# Patient Record
Sex: FEMALE | Race: WHITE | NOT HISPANIC OR LATINO | Employment: FULL TIME | URBAN - NONMETROPOLITAN AREA
[De-identification: names, ages, dates, MRNs, and addresses within clinical notes are randomized per-mention and may not be internally consistent; named-entity substitution may affect disease eponyms.]

---

## 2017-01-23 DIAGNOSIS — Z30.42 ENCOUNTER FOR SURVEILLANCE OF INJECTABLE CONTRACEPTIVE: Primary | ICD-10-CM

## 2017-01-23 RX ORDER — MEDROXYPROGESTERONE ACETATE 150 MG/ML
150 INJECTION, SUSPENSION INTRAMUSCULAR
Qty: 1 ML | Refills: 6 | OUTPATIENT
Start: 2017-01-23 | End: 2019-05-01

## 2017-01-26 ENCOUNTER — ALLIED HEALTH/NURSE VISIT (OUTPATIENT)
Dept: ALLERGY | Facility: OTHER | Age: 27
End: 2017-01-26
Attending: PHYSICIAN ASSISTANT
Payer: COMMERCIAL

## 2017-01-26 PROCEDURE — 96372 THER/PROPH/DIAG INJ SC/IM: CPT

## 2017-01-26 NOTE — PROGRESS NOTES
The following medication was given:     MEDICATION: Depo Provera 150mg  ROUTE: IM  SITE: LUQ - Gluteus  DOSE: 1 ml  LOT #: [id number]  :  [employer]   EXPIRATION DATE:  08/2019  NDC#: [address]-0  Patient instructed to return April 13-April 27 for next injection.  Troy Fernandez LPN

## 2017-02-16 ENCOUNTER — OFFICE VISIT (OUTPATIENT)
Dept: INTERNAL MEDICINE | Facility: OTHER | Age: 27
End: 2017-02-16
Attending: NURSE PRACTITIONER
Payer: COMMERCIAL

## 2017-02-16 VITALS
DIASTOLIC BLOOD PRESSURE: 78 MMHG | RESPIRATION RATE: 18 BRPM | HEART RATE: 111 BPM | OXYGEN SATURATION: 98 % | SYSTOLIC BLOOD PRESSURE: 110 MMHG | HEIGHT: 66 IN | BODY MASS INDEX: 44.2 KG/M2 | WEIGHT: 275 LBS | TEMPERATURE: 99.5 F

## 2017-02-16 DIAGNOSIS — J01.00 ACUTE MAXILLARY SINUSITIS, RECURRENCE NOT SPECIFIED: Primary | ICD-10-CM

## 2017-02-16 PROCEDURE — 99213 OFFICE O/P EST LOW 20 MIN: CPT | Performed by: NURSE PRACTITIONER

## 2017-02-16 RX ORDER — AZITHROMYCIN 250 MG/1
TABLET, FILM COATED ORAL
Qty: 6 TABLET | Refills: 0 | Status: SHIPPED | OUTPATIENT
Start: 2017-02-16 | End: 2017-06-08

## 2017-02-16 ASSESSMENT — PAIN SCALES - GENERAL: PAINLEVEL: NO PAIN (0)

## 2017-02-16 NOTE — MR AVS SNAPSHOT
After Visit Summary   2/16/2017    Destini Quintanilla    MRN: [id number]           Patient Information     Date Of Birth          1990        Visit Information        Provider Department      2/16/2017 9:00 AM Jose [facility] Pollack NP [facility]        Today's Diagnoses     Acute maxillary sinusitis, recurrence not specified    -  1      Care Instructions    1. Drink  Lots fluids  2. Zpak -500mg today, 250mg a day for 4 days.   3, Flonase or nasonex spray if desired 2 times a day.         Follow-ups after your visit        Your next 10 appointments already scheduled     Feb 16, 2017  9:00 AM CST   (Arrive by 8:45 AM)   Office Visit with Jose Juan Pollack NP   [facility]    [address]  [facility] [address]   [phone]           Bring a current list of meds and any records pertaining to this visit.  For Physicals, please bring immunization records and any forms needing to be filled out.    Please arrive 15 minutes early to complete paperwork and register.              Who to contact     If you have questions or need follow up information about today's clinic visit or your schedule please contact [facility] directly at [phone].  Normal or non-critical lab and imaging results will be communicated to you by MyChart, letter or phone within 4 business days after the clinic has received the results. If you do not hear from us within 7 days, please contact the clinic through MyChart or phone. If you have a critical or abnormal lab result, we will notify you by phone as soon as possible.  Submit refill requests through [employer] or call your pharmacy and they will forward the refill request to us. Please allow 3 business days for your refill to be completed.          Additional Information About Your Visit        [employer] Information     [employer] lets you send messages to your doctor, view your test results, renew your prescriptions, schedule  "appointments and more. To sign up, go to [email or web address] . Click on \"Log in\" on the left side of the screen, which will take you to the Welcome page. Then click on \"Sign up Now\" on the right side of the page.     You will be asked to enter the access code listed below, as well as some personal information. Please follow the directions to create your username and password.     Your access code is: [email or web address]  Expires: 2017  8:48 AM     Your access code will  in 90 days. If you need help or a new code, please call your [place] clinic or [phone].        Care EveryWhere ID     This is your Care EveryWhere ID. This could be used by other organizations to access your [place] medical records  [id number]        Your Vitals Were     Pulse Temperature Respirations Height Pulse Oximetry BMI (Body Mass Index)    111 99.5  F (37.5  C) (Tympanic) 18 5' 6\" (1.676 m) 98% 44.39 kg/m2       Blood Pressure from Last 3 Encounters:   17 110/78   10/07/16 110/78   16 139/96    Weight from Last 3 Encounters:   17 275 lb (124.7 kg)   10/07/16 270 lb (122.5 kg)   16 278 lb (126.1 kg)              Today, you had the following     No orders found for display         Today's Medication Changes          These changes are accurate as of: 17  8:48 AM.  If you have any questions, ask your nurse or doctor.               Start taking these medicines.        Dose/Directions    azithromycin 250 MG tablet   Commonly known as:  ZITHROMAX   Used for:  Acute maxillary sinusitis, recurrence not specified   Started by:  Jose Juan Pollack NP        Two tablets first day, then one tablet daily for four days.   Quantity:  6 tablet   Refills:  0            Where to get your medicines      These medications were sent to [facility] [address]  [address] [place] [address]     Phone:  [phone]     azithromycin 250 MG tablet                Primary Care Provider " Office Phone # Fax #    Jose Juan Pollack [phone]       [facility] [place] [address]  [facility] [address]        Thank you!     Thank you for choosing [facility]  for your care. Our goal is always to provide you with excellent care. Hearing back from our patients is one way we can continue to improve our services. Please take a few minutes to complete the written survey that you may receive in the mail after your visit with us. Thank you!             Your Updated Medication List - Protect others around you: Learn how to safely use, store and throw away your medicines at www.disposemymeds.org.          This list is accurate as of: 2/16/17  8:48 AM.  Always use your most recent med list.                   Brand Name Dispense Instructions for use    azithromycin 250 MG tablet    ZITHROMAX    6 tablet    Two tablets first day, then one tablet daily for four days.       medroxyPROGESTERone 150 MG/ML injection    DEPO-PROVERA    1 mL    Inject 1 mL (150 mg) into the muscle every 3 months

## 2017-02-16 NOTE — LETTER
[facility] [place]  [address] [facility] [address]  [phone]             February 16, 2017    Destini Quintanilla  [birth year] [address] [facility] [address]              Dear Destini,    Please excuse Destini from work for medical illness    If you have any questions or concerns, please call myself or my nurse at [phone].      Sincerely,      Jose Juan CARR

## 2017-02-16 NOTE — NURSING NOTE
"Chief Complaint   Patient presents with     URI     c/o sneezing, cough, nasal drainage, stomache feels weird       Initial /78 (BP Location: Left arm, Patient Position: Chair, Cuff Size: Adult Large)  Pulse 111  Temp 99.5  F (37.5  C) (Tympanic)  Resp 18  Ht 5' 6\" (1.676 m)  Wt 275 lb (124.7 kg)  SpO2 98%  BMI 44.39 kg/m2 Estimated body mass index is 44.39 kg/(m^2) as calculated from the following:    Height as of this encounter: 5' 6\" (1.676 m).    Weight as of this encounter: 275 lb (124.7 kg).  Medication Reconciliation: complete   Milagros Sultana      "

## 2017-02-16 NOTE — PROGRESS NOTES
"SUBJECTIVE:  Destini ISABEL Quintanilla, a 26 year old female scheduled an appointment to discuss the following issues:  HA/Congestion: Started with HA over weekend.  Unknown if fever. THen sore throat. On Monday very congested-has postnasal drip,  No ear ache.  No NVD.    Medical, social, surgical, and family histories reviewed.    Current Outpatient Prescriptions   Medication     azithromycin (ZITHROMAX) 250 MG tablet     medroxyPROGESTERone (DEPO-PROVERA) 150 MG/ML injection     No current facility-administered medications for this visit.        ROS:  C: NEGATIVE for fever, chills,  POSITIVE   sore throat,  NO  Earache+ postnasal drip.   E: NEGATIVE for vision changes   R: NEGATIVE for  cough , SOB  CV: NEGATIVE for chest pain, palpitations   GI: NEGATIVE for nausea, abdominal pain, heartburn, or constipation, diarrhea.   : NEGATIVE for frequency, dysuria, or hematuria  M: NEGATIVE for significant arthralgias or myalgia  N: NEGATIVE for weakness,  Paresthesias, dizziness , + headache    OBJECTIVE:  /78 (BP Location: Left arm, Patient Position: Chair, Cuff Size: Adult Large)  Pulse 111  Temp 99.5  F (37.5  C) (Tympanic)  Resp 18  Ht 5' 6\" (1.676 m)  Wt 275 lb (124.7 kg)  SpO2 98%  BMI 44.39 kg/m2  EXAM:  GENERAL APPEARANCE:  alert and mild distress  EYES: EOMI,  PERRL  HENT:EARS: TM's pearly gray, good lite reflex, NOSE: Nares red, moist   Very  Swollen, clear exudate. Sounds very nasal.    THROAT: Oropharynx pink  Tongue midline, no fasciculations.   NECK: Supple,right anterior lymphadenopathy, no thyromegaly, no carotid bruits, No JVD  RESP: lungs clear to auscultation anteriorly and posteriorly - no rales, rhonchi or wheezes  CV: regular rates and rhythm, normal S1 S2, no S3 or S4 and no murmur, no click or rub -  MS: No edema         ASSESSMENT / PLAN:  (J01.00) Acute maxillary sinusitis, recurrence not specified  (primary encounter diagnosis)  CommentZpak 500mg today, 250mg a day for 4 days  2. Drink lots of " water  3. May try flonase or nasonex 1 spray to each nostril BID for congestion.  4. Protect self during intercourse for 1 1/2 - 2 weeks.     Plan: azithromycin (ZITHROMAX) 250 MG tablet

## 2017-02-16 NOTE — PATIENT INSTRUCTIONS
1. Drink  Lots fluids  2. Zpak -500mg today, 250mg a day for 4 days.   3, Flonase or nasonex spray if desired 2 times a day.

## 2017-02-17 RX ORDER — PREDNISONE 20 MG/1
20 TABLET ORAL DAILY
Qty: 5 TABLET | Refills: 0 | Status: SHIPPED | OUTPATIENT
Start: 2017-02-17 | End: 2017-06-08

## 2017-03-23 ENCOUNTER — APPOINTMENT (OUTPATIENT)
Dept: OCCUPATIONAL MEDICINE | Facility: OTHER | Age: 27
End: 2017-03-23

## 2017-03-23 PROCEDURE — 99000 SPECIMEN HANDLING OFFICE-LAB: CPT

## 2017-06-08 ENCOUNTER — OFFICE VISIT (OUTPATIENT)
Dept: INTERNAL MEDICINE | Facility: OTHER | Age: 27
End: 2017-06-08
Attending: NURSE PRACTITIONER
Payer: COMMERCIAL

## 2017-06-08 VITALS
SYSTOLIC BLOOD PRESSURE: 118 MMHG | DIASTOLIC BLOOD PRESSURE: 68 MMHG | OXYGEN SATURATION: 99 % | BODY MASS INDEX: 46 KG/M2 | TEMPERATURE: 97.7 F | WEIGHT: 285 LBS | HEART RATE: 104 BPM

## 2017-06-08 DIAGNOSIS — Z30.8 ENCOUNTER FOR OTHER CONTRACEPTIVE MANAGEMENT: ICD-10-CM

## 2017-06-08 DIAGNOSIS — L43.9 LICHEN PLANUS: Primary | ICD-10-CM

## 2017-06-08 LAB — HCG UR QL: NEGATIVE

## 2017-06-08 PROCEDURE — 81025 URINE PREGNANCY TEST: CPT | Performed by: NURSE PRACTITIONER

## 2017-06-08 PROCEDURE — 99213 OFFICE O/P EST LOW 20 MIN: CPT | Performed by: NURSE PRACTITIONER

## 2017-06-08 RX ORDER — DESOXIMETASONE 2.5 MG/G
CREAM TOPICAL 2 TIMES DAILY
Qty: 15 G | Refills: 1 | Status: SHIPPED | OUTPATIENT
Start: 2017-06-08 | End: 2019-05-24

## 2017-06-08 ASSESSMENT — ANXIETY QUESTIONNAIRES
2. NOT BEING ABLE TO STOP OR CONTROL WORRYING: NOT AT ALL
6. BECOMING EASILY ANNOYED OR IRRITABLE: NOT AT ALL
IF YOU CHECKED OFF ANY PROBLEMS ON THIS QUESTIONNAIRE, HOW DIFFICULT HAVE THESE PROBLEMS MADE IT FOR YOU TO DO YOUR WORK, TAKE CARE OF THINGS AT HOME, OR GET ALONG WITH OTHER PEOPLE: NOT DIFFICULT AT ALL
GAD7 TOTAL SCORE: 0
7. FEELING AFRAID AS IF SOMETHING AWFUL MIGHT HAPPEN: NOT AT ALL
1. FEELING NERVOUS, ANXIOUS, OR ON EDGE: NOT AT ALL
3. WORRYING TOO MUCH ABOUT DIFFERENT THINGS: NOT AT ALL
5. BEING SO RESTLESS THAT IT IS HARD TO SIT STILL: NOT AT ALL

## 2017-06-08 ASSESSMENT — PATIENT HEALTH QUESTIONNAIRE - PHQ9: 5. POOR APPETITE OR OVEREATING: NOT AT ALL

## 2017-06-08 ASSESSMENT — PAIN SCALES - GENERAL: PAINLEVEL: NO PAIN (0)

## 2017-06-08 NOTE — NURSING NOTE
"Chief Complaint   Patient presents with     Derm Problem     bumps on shoulder. Duration: 1 month. Patient states bumps are not painful or itchy      Contraception     patient needs depo shot        Initial /68 (BP Location: Left arm, Patient Position: Chair, Cuff Size: Adult Large)  Pulse 104  Temp 97.7  F (36.5  C) (Tympanic)  Wt 285 lb (129.3 kg)  SpO2 99%  Breastfeeding? No  BMI 46 kg/m2 Estimated body mass index is 46 kg/(m^2) as calculated from the following:    Height as of 2/16/17: 5' 6\" (1.676 m).    Weight as of this encounter: 285 lb (129.3 kg).  Medication Reconciliation: complete   Jaye Ludwig CMA(AAMA)   "

## 2017-06-08 NOTE — MR AVS SNAPSHOT
After Visit Summary   6/8/2017    Destini Quintanilla    MRN: [id number]           Patient Information     Date Of Birth          1990        Visit Information        Provider Department      6/8/2017 8:00 AM [facility]        Today's Diagnoses     Lichen planus    -  1      Care Instructions    1. Topicort cream  To rash 2 times a day  Thinly.   2. If rash does not resolve-will do biopsy.              Follow-ups after your visit        Who to contact     If you have questions or need follow up information about today's clinic visit or your schedule please contact [facility] directly at [phone].  Normal or non-critical lab and imaging results will be communicated to you by MyChart, letter or phone within 4 business days after the clinic has received the results. If you do not hear from us within 7 days, please contact the clinic through MyChart or phone. If you have a critical or abnormal lab result, we will notify you by phone as soon as possible.  Submit refill requests through [employer] or call your pharmacy and they will forward the refill request to us. Please allow 3 business days for your refill to be completed.          Additional Information About Your Visit        Care EveryWhere ID     This is your Care EveryWhere ID. This could be used by other organizations to access your [place] medical records  [id number]        Your Vitals Were     Pulse Temperature Pulse Oximetry Breastfeeding? BMI (Body Mass Index)       104 97.7  F (36.5  C) (Tympanic) 99% No 46 kg/m2        Blood Pressure from Last 3 Encounters:   06/08/17 118/68   02/16/17 110/78   10/07/16 110/78    Weight from Last 3 Encounters:   06/08/17 285 lb (129.3 kg)   02/16/17 275 lb (124.7 kg)   10/07/16 270 lb (122.5 kg)              Today, you had the following     No orders found for display         Today's Medication Changes          These changes are accurate as of: 6/8/17  8:18  AM.  If you have any questions, ask your nurse or doctor.               Start taking these medicines.        Dose/Directions    desoximetasone 0.25 % cream   Commonly known as:  TOPICORT   Used for:  Lichen planus   Started by:  Jose Juan Pollack NP        Apply topically 2 times daily   Quantity:  15 g   Refills:  1            Where to get your medicines      These medications were sent to [facility] [place] - [address]  [address] [place] [address]     Phone:  [phone]     desoximetasone 0.25 % cream                Primary Care Provider Office Phone # Fax #    Jose Juan Pollack [phone]       [facility] [address]  [place] [address]        Thank you!     Thank you for choosing [facility]  for your care. Our goal is always to provide you with excellent care. Hearing back from our patients is one way we can continue to improve our services. Please take a few minutes to complete the written survey that you may receive in the mail after your visit with us. Thank you!             Your Updated Medication List - Protect others around you: Learn how to safely use, store and throw away your medicines at www.disposemymeds.org.          This list is accurate as of: 6/8/17  8:18 AM.  Always use your most recent med list.                   Brand Name Dispense Instructions for use    desoximetasone 0.25 % cream    TOPICORT    15 g    Apply topically 2 times daily       medroxyPROGESTERone 150 MG/ML injection    DEPO-PROVERA    1 mL    Inject 1 mL (150 mg) into the muscle every 3 months

## 2017-06-08 NOTE — PROGRESS NOTES
SUBJECTIVE:  Destini ISABEL Quintanilla, a 26 year old female scheduled an appointment to discuss the following issues:  Bumps on arms: Has  Bumps on arm in inner elbow and across right shoulder   that appeared a month ago.  Do not itch-tried squeezing but don't go away.  Has been out in sun -hasn't had a  Reaction to  Sun before-has intolerance to peanut butter-and has been eating more peanut butter.  Depo Provera-needs injection.      Medical, social, surgical, and family histories reviewed.    Current Outpatient Prescriptions   Medication     medroxyPROGESTERone (DEPO-PROVERA) 150 MG/ML injection     No current facility-administered medications for this visit.        ROS:  C: NEGATIVE for fever, chills, sore throat, earache  E: NEGATIVE for vision changes   R: NEGATIVE for  cough , SOB  CV: NEGATIVE for chest pain, palpitations   GI: NEGATIVE for nausea, abdominal pain, heartburn, or constipation, diarrhea.   : NEGATIVE for frequency, dysuria, or hematuria  M: NEGATIVE for significant arthralgias or myalgia  Skin has bumps on inner elbow, and line of bumps on right shoulder that form almost a circular pattern in both areas.  -Non itchy, not painful    N: NEGATIVE for weakness,  Paresthesias, dizziness  or headache    OBJECTIVE:  /68 (BP Location: Left arm, Patient Position: Chair, Cuff Size: Adult Large)  Pulse 104  Temp 97.7  F (36.5  C) (Tympanic)  Wt 285 lb (129.3 kg)  SpO2 99%  Breastfeeding? No  BMI 46 kg/m2  EXAM:  GENERAL APPEARANCE:  alert and no distress  EYES: EOMI,  PERRL  .   NECK: Supple, no lymphadenopathy, no thyromegaly, no carotid bruits, No JVD  RESP: lungs clear to auscultation anteriorly and posteriorly - no rales, rhonchi or wheezes  CV: regular rates and rhythm, normal S1 S2, no S3 or S4 and no murmur, no click or rub -  l  MS: No edema Skin -has bumpy rash to inner antecubital  Area on Left, and line of bumps            ASSESSMENT / PLAN:  (L43.9) Lichen planus  (primary encounter  diagnosis)  Comment: Looks like lichen planus.  Discussed.    Plan: Topicort  Cream to rash 2 times a day thinly. Can do biopsy if doesn't resolve.      (Z30.8) Encounter for other contraceptive management  Comment:Given Depo Provera given.    Plan: HCG qualitative urine, C Medroxyprogesterone         inj/1mg

## 2017-06-08 NOTE — PATIENT INSTRUCTIONS
1. Topicort cream  To rash 2 times a day  Thinly.   2. If rash does not resolve-will do biopsy.

## 2017-06-09 ASSESSMENT — PATIENT HEALTH QUESTIONNAIRE - PHQ9: SUM OF ALL RESPONSES TO PHQ QUESTIONS 1-9: 0

## 2017-06-09 ASSESSMENT — ANXIETY QUESTIONNAIRES: GAD7 TOTAL SCORE: 0

## 2017-08-04 ENCOUNTER — APPOINTMENT (OUTPATIENT)
Dept: OCCUPATIONAL MEDICINE | Facility: OTHER | Age: 27
End: 2017-08-04

## 2017-08-11 ENCOUNTER — APPOINTMENT (OUTPATIENT)
Dept: OCCUPATIONAL MEDICINE | Facility: OTHER | Age: 27
End: 2017-08-11

## 2017-11-17 ENCOUNTER — OFFICE VISIT (OUTPATIENT)
Dept: INTERNAL MEDICINE | Facility: OTHER | Age: 27
End: 2017-11-17
Attending: NURSE PRACTITIONER
Payer: COMMERCIAL

## 2017-11-17 VITALS
HEIGHT: 65 IN | WEIGHT: 268 LBS | DIASTOLIC BLOOD PRESSURE: 68 MMHG | OXYGEN SATURATION: 99 % | HEART RATE: 72 BPM | BODY MASS INDEX: 44.65 KG/M2 | SYSTOLIC BLOOD PRESSURE: 116 MMHG | TEMPERATURE: 97.8 F

## 2017-11-17 DIAGNOSIS — Z00.00 ROUTINE HISTORY AND PHYSICAL EXAMINATION OF ADULT: Primary | ICD-10-CM

## 2017-11-17 DIAGNOSIS — Z30.42 ON DEPO-PROVERA FOR CONTRACEPTION: ICD-10-CM

## 2017-11-17 DIAGNOSIS — N76.0 BACTERIAL VAGINOSIS: ICD-10-CM

## 2017-11-17 DIAGNOSIS — B96.89 BACTERIAL VAGINOSIS: ICD-10-CM

## 2017-11-17 LAB
ALBUMIN SERPL-MCNC: 3.7 G/DL (ref 3.4–5)
ALP SERPL-CCNC: 80 U/L (ref 40–150)
ALT SERPL W P-5'-P-CCNC: 27 U/L (ref 0–50)
ANION GAP SERPL CALCULATED.3IONS-SCNC: 6 MMOL/L (ref 3–14)
AST SERPL W P-5'-P-CCNC: 17 U/L (ref 0–45)
BASOPHILS # BLD AUTO: 0 10E9/L (ref 0–0.2)
BASOPHILS NFR BLD AUTO: 0.7 %
BILIRUB SERPL-MCNC: 0.5 MG/DL (ref 0.2–1.3)
BUN SERPL-MCNC: 10 MG/DL (ref 7–30)
CALCIUM SERPL-MCNC: 9 MG/DL (ref 8.5–10.1)
CHLORIDE SERPL-SCNC: 106 MMOL/L (ref 94–109)
CHOLEST SERPL-MCNC: 162 MG/DL
CO2 SERPL-SCNC: 27 MMOL/L (ref 20–32)
CREAT SERPL-MCNC: 0.67 MG/DL (ref 0.52–1.04)
DIFFERENTIAL METHOD BLD: NORMAL
EOSINOPHIL # BLD AUTO: 0.2 10E9/L (ref 0–0.7)
EOSINOPHIL NFR BLD AUTO: 3.1 %
ERYTHROCYTE [DISTWIDTH] IN BLOOD BY AUTOMATED COUNT: 11.8 % (ref 10–15)
GFR SERPL CREATININE-BSD FRML MDRD: >90 ML/MIN/1.7M2
GLUCOSE SERPL-MCNC: 88 MG/DL (ref 70–99)
HCG UR QL: NEGATIVE
HCT VFR BLD AUTO: 41.8 % (ref 35–47)
HDLC SERPL-MCNC: 48 MG/DL
HGB BLD-MCNC: 14.4 G/DL (ref 11.7–15.7)
IMM GRANULOCYTES # BLD: 0 10E9/L (ref 0–0.4)
IMM GRANULOCYTES NFR BLD: 0.3 %
LDLC SERPL CALC-MCNC: 96 MG/DL
LYMPHOCYTES # BLD AUTO: 2.3 10E9/L (ref 0.8–5.3)
LYMPHOCYTES NFR BLD AUTO: 38.8 %
MCH RBC QN AUTO: 30.6 PG (ref 26.5–33)
MCHC RBC AUTO-ENTMCNC: 34.4 G/DL (ref 31.5–36.5)
MCV RBC AUTO: 89 FL (ref 78–100)
MONOCYTES # BLD AUTO: 0.5 10E9/L (ref 0–1.3)
MONOCYTES NFR BLD AUTO: 8.9 %
NEUTROPHILS # BLD AUTO: 2.8 10E9/L (ref 1.6–8.3)
NEUTROPHILS NFR BLD AUTO: 48.2 %
NONHDLC SERPL-MCNC: 114 MG/DL
NRBC # BLD AUTO: 0 10*3/UL
NRBC BLD AUTO-RTO: 0 /100
PLATELET # BLD AUTO: 305 10E9/L (ref 150–450)
POTASSIUM SERPL-SCNC: 3.9 MMOL/L (ref 3.4–5.3)
PROT SERPL-MCNC: 7.6 G/DL (ref 6.8–8.8)
RBC # BLD AUTO: 4.7 10E12/L (ref 3.8–5.2)
SODIUM SERPL-SCNC: 139 MMOL/L (ref 133–144)
SPECIMEN SOURCE: ABNORMAL
TRIGL SERPL-MCNC: 91 MG/DL
TSH SERPL DL<=0.005 MIU/L-ACNC: 2.89 MU/L (ref 0.4–4)
WBC # BLD AUTO: 5.9 10E9/L (ref 4–11)
WET PREP SPEC: ABNORMAL

## 2017-11-17 PROCEDURE — 80061 LIPID PANEL: CPT | Performed by: NURSE PRACTITIONER

## 2017-11-17 PROCEDURE — G0123 SCREEN CERV/VAG THIN LAYER: HCPCS | Performed by: NURSE PRACTITIONER

## 2017-11-17 PROCEDURE — 87210 SMEAR WET MOUNT SALINE/INK: CPT | Performed by: NURSE PRACTITIONER

## 2017-11-17 PROCEDURE — 99395 PREV VISIT EST AGE 18-39: CPT | Performed by: NURSE PRACTITIONER

## 2017-11-17 PROCEDURE — 81025 URINE PREGNANCY TEST: CPT | Performed by: NURSE PRACTITIONER

## 2017-11-17 PROCEDURE — 80050 GENERAL HEALTH PANEL: CPT | Performed by: NURSE PRACTITIONER

## 2017-11-17 PROCEDURE — 36415 COLL VENOUS BLD VENIPUNCTURE: CPT | Performed by: NURSE PRACTITIONER

## 2017-11-17 RX ORDER — MEDROXYPROGESTERONE ACETATE 150 MG/ML
150 INJECTION, SUSPENSION INTRAMUSCULAR
Qty: 150 ML | Refills: 4 | OUTPATIENT
Start: 2017-11-17 | End: 2019-05-01

## 2017-11-17 ASSESSMENT — ANXIETY QUESTIONNAIRES
4. TROUBLE RELAXING: NOT AT ALL
1. FEELING NERVOUS, ANXIOUS, OR ON EDGE: NOT AT ALL
2. NOT BEING ABLE TO STOP OR CONTROL WORRYING: NOT AT ALL
6. BECOMING EASILY ANNOYED OR IRRITABLE: NOT AT ALL
7. FEELING AFRAID AS IF SOMETHING AWFUL MIGHT HAPPEN: NOT AT ALL
5. BEING SO RESTLESS THAT IT IS HARD TO SIT STILL: NOT AT ALL
GAD7 TOTAL SCORE: 0
3. WORRYING TOO MUCH ABOUT DIFFERENT THINGS: NOT AT ALL

## 2017-11-17 ASSESSMENT — PAIN SCALES - GENERAL: PAINLEVEL: NO PAIN (0)

## 2017-11-17 ASSESSMENT — PATIENT HEALTH QUESTIONNAIRE - PHQ9: SUM OF ALL RESPONSES TO PHQ QUESTIONS 1-9: 0

## 2017-11-17 NOTE — NURSING NOTE
"Chief Complaint   Patient presents with     Physical     patient on regular diet. has no been to the dentist twice a year. has been to the eye doctor in the last two years. patient does not exercise. doesn't have more then 3 alcholic beverages in a day and is on a regular diet      *_* Health Care Directive *_*     declined        Initial /68 (BP Location: Left arm, Patient Position: Chair, Cuff Size: Adult Large)  Pulse 72  Temp 97.8  F (36.6  C) (Tympanic)  Ht 5' 5.25\" (1.657 m)  Wt 268 lb (121.6 kg)  SpO2 99%  Breastfeeding? No  BMI 44.26 kg/m2 Estimated body mass index is 44.26 kg/(m^2) as calculated from the following:    Height as of this encounter: 5' 5.25\" (1.657 m).    Weight as of this encounter: 268 lb (121.6 kg).  Medication Reconciliation: rosalie LudwigThe following medication was given:     MEDICATION: Depo Provera 150mg  ROUTE: IM  SITE: RUQ - Gluteus  DOSE: 150 mg  LOT #: [id number]  :  [employer]   EXPIRATION DATE:  11/2019  NDC#: [address]-9  Pregnancy test negative. The patient tolerated well. Patient to return for next injection between 2/2/18 and 2/16/18 Milagros Sultana CMA([facility])   "

## 2017-11-17 NOTE — PROGRESS NOTES
Destini is a 27 year old female   who presents for physical      PMHx:  No past medical history on file.  @SURGICALHX  GYNE HX: On Depo   No periods, no breakthrough bleeding.    , no dyspareunia. Is not currently in a relationship      No history of abnormal pap or MMG.      MEDICATIONS were reviewed and are as follows:   Current Outpatient Prescriptions   Medication Sig Dispense Refill     desoximetasone (TOPICORT) 0.25 % cream Apply topically 2 times daily 15 g 1     medroxyPROGESTERone (DEPO-PROVERA) 150 MG/ML injection Inject 1 mL (150 mg) into the muscle every 3 months 1 mL 6       ALLERGIES:  No known drug allergy    Family History   Problem Relation Age of Onset     CANCER Maternal Grandmother      CANCER Maternal Grandfather      liver     HEART DISEASE Paternal Grandmother      HEART DISEASE Paternal Grandfather        Review Of Systems  Skin: negative for, rash, itching, bruising  Eyes: negative for, visual blurring, eye pain  Wears Contacts.    Ears/Nose/Throat: EARS: No pain, discharge   NOSE: no runny nose, postnasal drip or bloody nose.  . THROAT:  No pain, or difficulty chewing or swallowing.  Teeth in good repair. No Dentist.    NECK: no pain, stiffness, lumps or bumps.    Breast: No masses, no nipple drainage.  Respiratory: No shortness of breath, dyspnea on exertion, cough, or hemoptysis  Cardiovascular: negative for, palpitations and chest pain  Gastrointestinal: negative for, nausea, vomiting, heartburn, dyspepsia, reflux, excessive gas or bloating, constipation and diarrhea No blood in stool    Genitourinary: negative for, dysuria, frequency and urgency  Musculoskeletal: negative for, back pain, neck pain, joint pain and joint stiffness  Neurologic: negative for, headaches, syncope, local weakness, numbness or tingling of hands, numbness or tingling of feet, memory problems and tremor  Psychiatric: negative for, anxiety and depression  Hematologic/Lymphatic/Immunologic: negative for, chills,  "fever, night sweats and weight loss  Endocrine: negative for, cold intolerance, heat intolerance and night sweats  IMMUNIZATIONS:  Up to date   by report.Does not want flu vaccine.       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    Exam:/68 (BP Location: Left arm, Patient Position: Chair, Cuff Size: Adult Large)  Pulse 72  Temp 97.8  F (36.6  C) (Tympanic)  Ht 5' 5.25\" (1.657 m)  Wt 268 lb (121.6 kg)  SpO2 99%  Breastfeeding? No  BMI 44.26 kg/m2    Constitutional: healthy, alert and no distress  Head: Normocephalic. No masses, lesions, tenderness or abnormalities  Neck: Neck supple. No adenopathy. Thyroid symmetric, normal size,, Carotids without bruits.  ENT: EARS: Bilateral TM's pearly gray, good lite reflex. NOSE: Bilateral nares red, nonswollen, no exudates. THROAT: Oropharynx pink, no exudates Tongue midline, teeth in good repair.  no neck nodes or sinus tenderness  Cardiovascular: S1S2  No lifts, heaves, or thrills. RRR. No murmurs, clicks gallops or rub, no JVD  Respiratory: . Good diaphragmatic excursion. Lungs clear anteriorly and Posteriorly.     Breasts: Right smaller than left  without suspicious masses,  But has lumpy breasts.  No   skin changes or axillary nodes,   Gastrointestinal: Abdomen soft, non-tender. BS normal. No masses, organomegaly  : Labia without lumps or lesions.  Vagina with appropriate rugae.  Cervix pink.  Much whitish secretions.  Wet prep done.  PAP done.    Musculoskeletal: extremities normal- no gross deformities noted, gait normal and normal muscle tone  Skin: no suspicious lesions or rashes  Neurologic: Gait normal. Reflexes normal and symmetric. Sensation grossly WNL.  Psychiatric: mentation appears normal and affect normal/bright  Hematologic/Lymphatic/Immunologic: normal ant/post cervical, axillary, supraclavicular and inguinal nodes  Results for orders placed or performed in visit on " 11/17/17   HCG qualitative urine   Result Value Ref Range    HCG Qual Urine Negative NEG^Negative   CBC with platelets and differential   Result Value Ref Range    WBC 5.9 4.0 - 11.0 10e9/L    RBC Count 4.70 3.8 - 5.2 10e12/L    Hemoglobin 14.4 11.7 - 15.7 g/dL    Hematocrit 41.8 35.0 - 47.0 %    MCV 89 78 - 100 fl    MCH 30.6 26.5 - 33.0 pg    MCHC 34.4 31.5 - 36.5 g/dL    RDW 11.8 10.0 - 15.0 %    Platelet Count 305 150 - 450 10e9/L    Diff Method Automated Method     % Neutrophils 48.2 %    % Lymphocytes 38.8 %    % Monocytes 8.9 %    % Eosinophils 3.1 %    % Basophils 0.7 %    % Immature Granulocytes 0.3 %    Nucleated RBCs 0 0 /100    Absolute Neutrophil 2.8 1.6 - 8.3 10e9/L    Absolute Lymphocytes 2.3 0.8 - 5.3 10e9/L    Absolute Monocytes 0.5 0.0 - 1.3 10e9/L    Absolute Eosinophils 0.2 0.0 - 0.7 10e9/L    Absolute Basophils 0.0 0.0 - 0.2 10e9/L    Abs Immature Granulocytes 0.0 0 - 0.4 10e9/L    Absolute Nucleated RBC 0.0    Comprehensive metabolic panel (BMP + Alb, Alk Phos, ALT, AST, Total. Bili, TP)   Result Value Ref Range    Sodium 139 133 - 144 mmol/L    Potassium 3.9 3.4 - 5.3 mmol/L    Chloride 106 94 - 109 mmol/L    Carbon Dioxide 27 20 - 32 mmol/L    Anion Gap 6 3 - 14 mmol/L    Glucose 88 70 - 99 mg/dL    Urea Nitrogen 10 7 - 30 mg/dL    Creatinine 0.67 0.52 - 1.04 mg/dL    GFR Estimate >90 >60 mL/min/1.7m2    GFR Estimate If Black >90 >60 mL/min/1.7m2    Calcium 9.0 8.5 - 10.1 mg/dL    Bilirubin Total 0.5 0.2 - 1.3 mg/dL    Albumin 3.7 3.4 - 5.0 g/dL    Protein Total 7.6 6.8 - 8.8 g/dL    Alkaline Phosphatase 80 40 - 150 U/L    ALT 27 0 - 50 U/L    AST 17 0 - 45 U/L   Lipid Profile (Chol, Trig, HDL, LDL calc)   Result Value Ref Range    Cholesterol 162 <200 mg/dL    Triglycerides 91 <150 mg/dL    HDL Cholesterol 48 (L) >49 mg/dL    LDL Cholesterol Calculated 96 <100 mg/dL    Non HDL Cholesterol 114 <130 mg/dL   TSH with free T4 reflex   Result Value Ref Range    TSH 2.89 0.40 - 4.00 mU/L   Wet  prep   Result Value Ref Range    Specimen Description Vagina     Wet Prep Clue cells seen (A)     Wet Prep Many  WBC'S seen       Wet Prep No Trichomonas seen     Wet Prep No yeast seen        ASSESSMENT:  Routine Medical Exam    ASSESSMENT / PLAN:  (Z00.00) Routine history and physical examination of adult  (primary encounter diagnosis)  Comment:  Breast exam    Lab Results   Component Value Date    WBC 5.9 11/17/2017     Lab Results   Component Value Date    RBC 4.70 11/17/2017     Lab Results   Component Value Date    HGB 14.4 11/17/2017     Lab Results   Component Value Date    HCT 41.8 11/17/2017     No components found for: MCT  Lab Results   Component Value Date    MCV 89 11/17/2017     Lab Results   Component Value Date    MCH 30.6 11/17/2017     Lab Results   Component Value Date    MCHC 34.4 11/17/2017     Lab Results   Component Value Date    RDW 11.8 11/17/2017     Lab Results   Component Value Date     11/17/2017     Creatinine   Date Value Ref Range Status   11/17/2017 0.67 0.52 - 1.04 mg/dL Final   ]  Potassium   Date Value Ref Range Status   11/17/2017 3.9 3.4 - 5.3 mmol/L Final   ]  Lab Results   Component Value Date    CHOL 162 11/17/2017     Lab Results   Component Value Date    HDL 48 11/17/2017     Lab Results   Component Value Date    LDL 96 11/17/2017     Lab Results   Component Value Date    TRIG 91 11/17/2017     Lab Results   Component Value Date    CHOLHDLRATIO 3.1 09/05/2014     Lab Results   Component Value Date    AST 17 11/17/2017     Lab Results   Component Value Date    ALT 27 11/17/2017       Plan: CBC with platelets and differential,         Comprehensive metabolic panel (BMP + Alb, Alk         Phos, ALT, AST, Total. Bili, TP), Lipid Profile        (Chol, Trig, HDL, LDL calc), TSH with free T4         reflex,         A pap thin layer screen reflex to HPV          - recommend age 25 - 29, Wet prep            (Z30.40) On Depo-Provera for contraception  Comment:Negative  pregnancy test. Given Depo Provera.  150mg.    Plan: HCG qualitative urine, medroxyPROGESTERone         (DEPO-PROVERA) 150 MG/ML injection         (N76.0,  B96.89) Bacterial vaginosis  Comment:+ clue cells on wet prep.  Called patient offered flagyl or clinda ovules.    Plan:patient to call back.

## 2017-11-17 NOTE — MR AVS SNAPSHOT
"              After Visit Summary   11/17/2017    Destini Quintanilla    MRN: [id number]           Patient Information     Date Of Birth          1990        Visit Information        Provider Department      11/17/2017 8:00 AM Jose Juan Pollack NP [facility]        Today's Diagnoses     On Depo-Provera for contraception    -  1    Routine history and physical examination of adult          Care Instructions    1. Keep up good health!!!  2. Go to Dentist.            Follow-ups after your visit        Who to contact     If you have questions or need follow up information about today's clinic visit or your schedule please contact [facility] directly at [phone].  Normal or non-critical lab and imaging results will be communicated to you by MyChart, letter or phone within 4 business days after the clinic has received the results. If you do not hear from us within 7 days, please contact the clinic through MyChart or phone. If you have a critical or abnormal lab result, we will notify you by phone as soon as possible.  Submit refill requests through [employer] or call your pharmacy and they will forward the refill request to us. Please allow 3 business days for your refill to be completed.          Additional Information About Your Visit        Care EveryWhere ID     This is your Care EveryWhere ID. This could be used by other organizations to access your [facility] medical records  [id number]        Your Vitals Were     Pulse Temperature Height Pulse Oximetry Breastfeeding? BMI (Body Mass Index)    72 97.8  F (36.6  C) (Tympanic) 5' 5.25\" (1.657 m) 99% No 44.26 kg/m2       Blood Pressure from Last 3 Encounters:   11/17/17 116/68   06/08/17 118/68   02/16/17 110/78    Weight from Last 3 Encounters:   11/17/17 268 lb (121.6 kg)   06/08/17 285 lb (129.3 kg)   02/16/17 275 lb (124.7 kg)              We Performed the Following     A pap thin layer screen reflex to HPV if ASCUS - recommend age 25 - " 29     A pap thin layer screen with  HPV - recommended age 30 - 65 years (select HPV order below)     CBC with platelets and differential     Comprehensive metabolic panel (BMP + Alb, Alk Phos, ALT, AST, Total. Bili, TP)     HCG qualitative urine     Lipid Profile (Chol, Trig, HDL, LDL calc)     TSH with free T4 reflex     Wet prep          Today's Medication Changes          These changes are accurate as of: 11/17/17  8:57 AM.  If you have any questions, ask your nurse or doctor.               These medicines have changed or have updated prescriptions.        Dose/Directions    * medroxyPROGESTERone 150 MG/ML injection   Commonly known as:  DEPO-PROVERA   This may have changed:  Another medication with the same name was added. Make sure you understand how and when to take each.   Used for:  Encounter for surveillance of injectable contraceptive   Changed by:  Jose Juan Pollack, ILEANA        Dose:  150 mg   Inject 1 mL (150 mg) into the muscle every 3 months   Quantity:  1 mL   Refills:  6       * medroxyPROGESTERone 150 MG/ML injection   Commonly known as:  DEPO-PROVERA   This may have changed:  You were already taking a medication with the same name, and this prescription was added. Make sure you understand how and when to take each.   Used for:  On Depo-Provera for contraception   Changed by:  Jose Juan Pollack NP        Dose:  150 mg   Inject 1 mL (150 mg) into the muscle every 3 months   Quantity:  150 mL   Refills:  4       * Notice:  This list has 2 medication(s) that are the same as other medications prescribed for you. Read the directions carefully, and ask your doctor or other care provider to review them with you.         Where to get your medicines      Some of these will need a paper prescription and others can be bought over the counter.  Ask your nurse if you have questions.     You don't need a prescription for these medications     medroxyPROGESTERone 150 MG/ML injection                Primary Care Provider  Office Phone # Fax #    Jose Juan Pollack [phone]       [facility] [place] [address]  [facility] [address]        Equal Access to Services     CAROLINA ELI : Hadii valeria ku hadyadio Soomaali, waaxda luqadaha, qaybta kaalmada adeegyada, waxcriss medeiros laAlexispietro herr. So [facility] [phone].    ATENCIÓN: Si habla español, tiene a quiñones disposición servicios gratuitos de asistencia lingüística. Llame al [phone].    We comply with applicable federal civil rights laws and [place] laws. We do not discriminate on the basis of race, color, national origin, age, disability, sex, sexual orientation, or gender identity.            Thank you!     Thank you for choosing [facility]  for your care. Our goal is always to provide you with excellent care. Hearing back from our patients is one way we can continue to improve our services. Please take a few minutes to complete the written survey that you may receive in the mail after your visit with us. Thank you!             Your Updated Medication List - Protect others around you: Learn how to safely use, store and throw away your medicines at www.disposemymeds.org.          This list is accurate as of: 11/17/17  8:57 AM.  Always use your most recent med list.                   Brand Name Dispense Instructions for use Diagnosis    desoximetasone 0.25 % cream    TOPICORT    15 g    Apply topically 2 times daily        * medroxyPROGESTERone 150 MG/ML injection    DEPO-PROVERA    1 mL    Inject 1 mL (150 mg) into the muscle every 3 months    Encounter for surveillance of injectable contraceptive       * medroxyPROGESTERone 150 MG/ML injection    DEPO-PROVERA    150 mL    Inject 1 mL (150 mg) into the muscle every 3 months    On Depo-Provera for contraception       * Notice:  This list has 2 medication(s) that are the same as other medications prescribed for you. Read the directions carefully, and ask your doctor or other care provider  to review them with you.

## 2017-11-18 ASSESSMENT — ANXIETY QUESTIONNAIRES: GAD7 TOTAL SCORE: 0

## 2017-11-20 DIAGNOSIS — B96.89 BACTERIAL VAGINOSIS: Primary | ICD-10-CM

## 2017-11-20 DIAGNOSIS — N76.0 BACTERIAL VAGINOSIS: Primary | ICD-10-CM

## 2017-11-22 LAB
COPATH REPORT: NORMAL
PAP: NORMAL

## 2018-02-05 ENCOUNTER — ALLIED HEALTH/NURSE VISIT (OUTPATIENT)
Dept: ALLERGY | Facility: OTHER | Age: 28
End: 2018-02-05
Attending: NURSE PRACTITIONER
Payer: COMMERCIAL

## 2018-02-05 DIAGNOSIS — Z30.9 CONTRACEPTIVE MANAGEMENT: Primary | ICD-10-CM

## 2018-02-05 PROCEDURE — 96372 THER/PROPH/DIAG INJ SC/IM: CPT

## 2018-02-05 NOTE — PROGRESS NOTES
Prior to injection verified patient identity using patient's name and date of birth.    Per orders of Jose Juan Pollack, injection of Depo Provera given by Khloe Dye. Patient instructed to remain in clinic for 20 minutes afterwards, and to report any adverse reaction to me immediately.    The following medication was given:     MEDICATION: Depo Provera 150mg  ROUTE: IM  SITE: LUQ - Gluteus  DOSE: 150 mg  LOT #: [id number]  :  [employer]   EXPIRATION DATE:  03/2020  NDC#: [address]-0  Pt to RTC April 23-May 7, 2018 for next injection  Khloe Dye LPN

## 2018-02-05 NOTE — MR AVS SNAPSHOT
After Visit Summary   2/5/2018    Destini Quintanilla    MRN: [id number]           Patient Information     Date Of Birth          1990        Visit Information        Provider Department      2/5/2018 1:15 PM HC SHOT ROOM [facility]        Today's Diagnoses     Contraceptive management    -  1       Follow-ups after your visit        Your next 10 appointments already scheduled     Feb 05, 2018  1:15 PM CST   injection with HC SHOT ROOM   [facility] [place] ([facility] [place] [facility]    [address]  [place] [address]   [phone]              Who to contact     If you have questions or need follow up information about today's clinic visit or your schedule please contact [facility] directly at [phone].  Normal or non-critical lab and imaging results will be communicated to you by MyChart, letter or phone within 4 business days after the clinic has received the results. If you do not hear from us within 7 days, please contact the clinic through MyChart or phone. If you have a critical or abnormal lab result, we will notify you by phone as soon as possible.  Submit refill requests through [employer] or call your pharmacy and they will forward the refill request to us. Please allow 3 business days for your refill to be completed.          Additional Information About Your Visit        Care EveryWhere ID     This is your Care EveryWhere ID. This could be used by other organizations to access your [facility] medical records  [id number]         Blood Pressure from Last 3 Encounters:   11/17/17 116/68   06/08/17 118/68   02/16/17 110/78    Weight from Last 3 Encounters:   11/17/17 268 lb (121.6 kg)   06/08/17 285 lb (129.3 kg)   02/16/17 275 lb (124.7 kg)              We Performed the Following     INJECTION INTRAMUSCULAR OR SUB-Q     Medroxyprogesterone inj  1mg   (Depo Provera J-Code)        Primary Care Provider Office Phone # Fax #    Jose Juan Pollack NP  [phone]       [facility] [place] [address]  [place] [address]        Equal Access to Services     CAROLINA ELI : Hadii aad ku hadyadijenaro [place], sadieda rhondarahatha, rios katonja pickering, wilfrido medeiros lamatthieurena herr. So [facility] [phone].    ATENCIÓN: Si habla español, tiene a quiñones disposición servicios gratuitos de asistencia lingüística. Llame al [phone].    We comply with applicable federal civil rights laws and [place] laws. We do not discriminate on the basis of race, color, national origin, age, disability, sex, sexual orientation, or gender identity.            Thank you!     Thank you for choosing [facility]  for your care. Our goal is always to provide you with excellent care. Hearing back from our patients is one way we can continue to improve our services. Please take a few minutes to complete the written survey that you may receive in the mail after your visit with us. Thank you!             Your Updated Medication List - Protect others around you: Learn how to safely use, store and throw away your medicines at www.disposemymeds.org.          This list is accurate as of 2/5/18  1:07 PM.  Always use your most recent med list.                   Brand Name Dispense Instructions for use Diagnosis    desoximetasone 0.25 % cream    TOPICORT    15 g    Apply topically 2 times daily        * medroxyPROGESTERone 150 MG/ML injection    DEPO-PROVERA    1 mL    Inject 1 mL (150 mg) into the muscle every 3 months    Encounter for surveillance of injectable contraceptive       * medroxyPROGESTERone 150 MG/ML injection    DEPO-PROVERA    150 mL    Inject 1 mL (150 mg) into the muscle every 3 months    On Depo-Provera for contraception       * Notice:  This list has 2 medication(s) that are the same as other medications prescribed for you. Read the directions carefully, and ask your doctor or other care provider to review them with you.

## 2018-04-25 ENCOUNTER — ALLIED HEALTH/NURSE VISIT (OUTPATIENT)
Dept: ALLERGY | Facility: OTHER | Age: 28
End: 2018-04-25
Attending: FAMILY MEDICINE
Payer: COMMERCIAL

## 2018-04-25 DIAGNOSIS — Z30.42 ENCOUNTER FOR SURVEILLANCE OF INJECTABLE CONTRACEPTIVE: Primary | ICD-10-CM

## 2018-04-25 PROCEDURE — 96372 THER/PROPH/DIAG INJ SC/IM: CPT

## 2018-04-25 NOTE — PROGRESS NOTES
Prior to injection verified patient identity using patient's name and date of birth.    The following medication was given:     MEDICATION: Depo Provera 150mg  ROUTE: IM  SITE: RUQ - Gluteus  DOSE: 1mL  LOT #: [id number]  :  [employer]   EXPIRATION DATE:  05/2020  NDC#: [address]-4  The patient was instructed to return between 7/11-7/25 for next injection.   Luciana Gipson

## 2018-04-25 NOTE — MR AVS SNAPSHOT
"              After Visit Summary   4/25/2018    Destini Quintanilla    MRN: [id number]           Patient Information     Date Of Birth          1990        Visit Information        Provider Department      4/25/2018 9:00 AM [facility]        Today's Diagnoses     Encounter for surveillance of injectable contraceptive    -  1       Follow-ups after your visit        Your next 10 appointments already scheduled     Apr 25, 2018  9:00 AM CDT   injection with HC SHOT ROOM   [facility] [place] ([facility] [place] [facility]    [address]  [place] [address]   [phone]              Who to contact     If you have questions or need follow up information about today's clinic visit or your schedule please contact [facility] directly at [phone].  Normal or non-critical lab and imaging results will be communicated to you by MyChart, letter or phone within 4 business days after the clinic has received the results. If you do not hear from us within 7 days, please contact the clinic through MyChart or phone. If you have a critical or abnormal lab result, we will notify you by phone as soon as possible.  Submit refill requests through [employer] or call your pharmacy and they will forward the refill request to us. Please allow 3 business days for your refill to be completed.          Additional Information About Your Visit        MyChart Information     [employer] lets you send messages to your doctor, view your test results, renew your prescriptions, schedule appointments and more. To sign up, go to [email or web address] . Click on \"Log in\" on the left side of the screen, which will take you to the Welcome page. Then click on \"Sign up Now\" on the right side of the page.     You will be asked to enter the access code listed below, as well as some personal information. Please follow the directions to create your username and password.     Your access code is: " [email or web address]  Expires: 2018  8:51 AM     Your access code will  in 90 days. If you need help or a new code, please call your [place] clinic or [phone].        Care EveryWhere ID     This is your Care EveryWhere ID. This could be used by other organizations to access your [place] medical records  [id number]         Blood Pressure from Last 3 Encounters:   17 116/68   17 118/68   17 110/78    Weight from Last 3 Encounters:   17 268 lb (121.6 kg)   17 285 lb (129.3 kg)   17 275 lb (124.7 kg)              We Performed the Following     INJECTION INTRAMUSCULAR OR SUB-Q     Medroxyprogesterone inj  1mg   (Depo Provera J-Code)        Primary Care Provider Office Phone # Fax #    Jose Juan SPENCER Pollack, ILEANA [phone]       [facility] [place] [address]  [facility] [place] [address]        Equal Access to Services     [facility]: Hadii aad ku hadasho Soomaali, waaxda luqadaha, qaybta kaalmada adeegyada, waxay idiin haypietro moreira . So [facility] [phone].    ATENCIÓN: Si habla español, tiene a quiñones disposición servicios gratuitos de asistencia lingüística. Llame al [phone].    We comply with applicable federal civil rights laws and [place] laws. We do not discriminate on the basis of race, color, national origin, age, disability, sex, sexual orientation, or gender identity.            Thank you!     Thank you for choosing [facility] [place]  for your care. Our goal is always to provide you with excellent care. Hearing back from our patients is one way we can continue to improve our services. Please take a few minutes to complete the written survey that you may receive in the mail after your visit with us. Thank you!             Your Updated Medication List - Protect others around you: Learn how to safely use, store and throw away your medicines at www.disposemymeds.org.          This list is accurate as of 18  8:51 AM.  Always use  your most recent med list.                   Brand Name Dispense Instructions for use Diagnosis    desoximetasone 0.25 % cream    TOPICORT    15 g    Apply topically 2 times daily        * medroxyPROGESTERone 150 MG/ML injection    DEPO-PROVERA    1 mL    Inject 1 mL (150 mg) into the muscle every 3 months    Encounter for surveillance of injectable contraceptive       * medroxyPROGESTERone 150 MG/ML injection    DEPO-PROVERA    150 mL    Inject 1 mL (150 mg) into the muscle every 3 months    On Depo-Provera for contraception       * Notice:  This list has 2 medication(s) that are the same as other medications prescribed for you. Read the directions carefully, and ask your doctor or other care provider to review them with you.

## 2018-08-03 ENCOUNTER — ALLIED HEALTH/NURSE VISIT (OUTPATIENT)
Dept: ALLERGY | Facility: OTHER | Age: 28
End: 2018-08-03
Attending: NURSE PRACTITIONER
Payer: COMMERCIAL

## 2018-08-03 ENCOUNTER — APPOINTMENT (OUTPATIENT)
Dept: LAB | Facility: OTHER | Age: 28
End: 2018-08-03
Attending: NURSE PRACTITIONER
Payer: COMMERCIAL

## 2018-08-03 DIAGNOSIS — Z30.42 ENCOUNTER FOR SURVEILLANCE OF INJECTABLE CONTRACEPTIVE: Primary | ICD-10-CM

## 2018-08-03 LAB — HCG UR QL: NEGATIVE

## 2018-08-03 PROCEDURE — 96372 THER/PROPH/DIAG INJ SC/IM: CPT

## 2018-08-03 PROCEDURE — 81025 URINE PREGNANCY TEST: CPT | Performed by: NURSE PRACTITIONER

## 2018-08-03 NOTE — MR AVS SNAPSHOT
After Visit Summary   8/3/2018    Destini Quintanilla    MRN: [id number]           Patient Information     Date Of Birth          1990        Visit Information        Provider Department      8/3/2018 8:30 AM HC SHOT ROOM [facility]        Today's Diagnoses     Encounter for surveillance of injectable contraceptive    -  1       Follow-ups after your visit        Who to contact     If you have questions or need follow up information about today's clinic visit or your schedule please contact [facility] directly at [phone].  Normal or non-critical lab and imaging results will be communicated to you by MyChart, letter or phone within 4 business days after the clinic has received the results. If you do not hear from us within 7 days, please contact the clinic through MyChart or phone. If you have a critical or abnormal lab result, we will notify you by phone as soon as possible.  Submit refill requests through [employer] or call your pharmacy and they will forward the refill request to us. Please allow 3 business days for your refill to be completed.          Additional Information About Your Visit        Care EveryWhere ID     This is your Care EveryWhere ID. This could be used by other organizations to access your [facility] medical records  [id number]         Blood Pressure from Last 3 Encounters:   11/17/17 116/68   06/08/17 118/68   02/16/17 110/78    Weight from Last 3 Encounters:   11/17/17 268 lb (121.6 kg)   06/08/17 285 lb (129.3 kg)   02/16/17 275 lb (124.7 kg)              We Performed the Following     C Medroxyprogesterone inj/1mg (J-Code)     HCG qualitative urine     THER/PROPH/DIAG INJ, SC/IM        Primary Care Provider Office Phone # Fax #    Jose Juan Pollack [phone] [address] [facility] [address]        Equal Access to Services     CAROLINA ELI AH: Crystal Reid, eros mccartney, rios pickering,  wilfrido mikejoann sengjames schuler'aan ah. So [facility] [phone].    ATENCIÓN: Si azaleala tima, tiene a quiñones disposición servicios gratuitos de asistencia lingüística. David aldridge [phone].    We comply with applicable federal civil rights laws and [place] laws. We do not discriminate on the basis of race, color, national origin, age, disability, sex, sexual orientation, or gender identity.            Thank you!     [facility] you [facility]  [facility]. Our goal is always to provide you with excellent care. Hearing back from our patients is one way we can continue to improve our services. Please take a few minutes to complete the written survey that you may receive in the mail after your visit with us. Thank you!             Your Updated Medication List - Protect others around you: Learn how to safely use, store and throw away your medicines at www.disposemymeds.org.          This list is accurate as of 8/3/18  8:41 AM.  Always use your most recent med list.                   Brand Name Dispense Instructions for use Diagnosis    desoximetasone 0.25 % cream    TOPICORT    15 g    Apply topically 2 times daily        * medroxyPROGESTERone 150 MG/ML injection    DEPO-PROVERA    1 mL    Inject 1 mL (150 mg) into the muscle every 3 months    Encounter for surveillance of injectable contraceptive       * medroxyPROGESTERone 150 MG/ML injection    DEPO-PROVERA    150 mL    Inject 1 mL (150 mg) into the muscle every 3 months    On Depo-Provera for contraception       * Notice:  This list has 2 medication(s) that are the same as other medications prescribed for you. Read the directions carefully, and ask your doctor or other care provider to review them with you.

## 2018-10-25 ENCOUNTER — TELEPHONE (OUTPATIENT)
Dept: ALLERGY | Facility: OTHER | Age: 28
End: 2018-10-25

## 2018-10-25 NOTE — TELEPHONE ENCOUNTER
Patient is on the schedule for a Depo-provera injection tomorrow, but order has .  PCP is Jose Juan Pollack.  Last office visit 17.  Medication is pended.  Thank you.    Saundra Denny RN

## 2018-10-26 ENCOUNTER — ALLIED HEALTH/NURSE VISIT (OUTPATIENT)
Dept: ALLERGY | Facility: OTHER | Age: 28
End: 2018-10-26
Attending: NURSE PRACTITIONER
Payer: COMMERCIAL

## 2018-10-26 PROCEDURE — 96372 THER/PROPH/DIAG INJ SC/IM: CPT

## 2018-10-26 NOTE — PROGRESS NOTES
MEDICATION: Depo Provera 150 mg  ROUTE: IM  SITE: RUQ - Gluteus  DOSE: 1 mL  LOT #: [id number]  :  Wipebook   EXPIRATION DATE:  06/2020  NDC#: [address]-7  Pt instructed to return to the clinic between 1/11-1/25/19 for next injection.  QUIRINO GARCIA

## 2018-10-26 NOTE — MR AVS SNAPSHOT
After Visit Summary   10/26/2018    Destini Quintanilla    MRN: [id number]           Patient Information     Date Of Birth          1990        Visit Information        Provider Department      10/26/2018 2:00 PM HC SHOT ROOM [facility]        Today's Diagnoses     Contraception    -  1       Follow-ups after your visit        Who to contact     If you have questions or need follow up information about today's clinic visit or your schedule please contact [facility] directly at [phone].  Normal or non-critical lab and imaging results will be communicated to you by MyChart, letter or phone within 4 business days after the clinic has received the results. If you do not hear from us within 7 days, please contact the clinic through MyChart or phone. If you have a critical or abnormal lab result, we will notify you by phone as soon as possible.  Submit refill requests through [employer] or call your pharmacy and they will forward the refill request to us. Please allow 3 business days for your refill to be completed.          Additional Information About Your Visit        Care EveryWhere ID     This is your Care EveryWhere ID. This could be used by other organizations to access your [facility] medical records  [id number]         Blood Pressure from Last 3 Encounters:   11/17/17 116/68   06/08/17 118/68   02/16/17 110/78    Weight from Last 3 Encounters:   11/17/17 268 lb (121.6 kg)   06/08/17 285 lb (129.3 kg)   02/16/17 275 lb (124.7 kg)              We Performed the Following     C Medroxyprogesterone inj/1mg     THER/PROPH/DIAG INJ, SC/IM        Primary Care Provider Office Phone # Fax #    Jose Juan Pollack [phone] [address] [facility] [address]        Equal Access to Services     CAROLINA ELI : Crystal Reid, eros mccartney, rios pickering, wilfrido herr. So [facility]  [phone].    ATENCIÓN: Si sveta alfred, tiene a quiñones disposición servicios gratuitos de asistencia lingüística. David aldridge [phone].    We comply with applicable federal civil rights laws and [place] laws. We do not discriminate on the basis of race, color, national origin, age, disability, sex, sexual orientation, or gender identity.            Thank you!     Thank you for choosing [facility]  for your care. Our goal is always to provide you with excellent care. Hearing back from our patients is one way we can continue to improve our services. Please take a few minutes to complete the written survey that you may receive in the mail after your visit with us. Thank you!             Your Updated Medication List - Protect others around you: Learn how to safely use, store and throw away your medicines at www.disposemymeds.org.          This list is accurate as of 10/26/18  2:09 PM.  Always use your most recent med list.                   Brand Name Dispense Instructions for use Diagnosis    desoximetasone 0.25 % cream    TOPICORT    15 g    Apply topically 2 times daily        * medroxyPROGESTERone 150 MG/ML injection    DEPO-PROVERA    1 mL    Inject 1 mL (150 mg) into the muscle every 3 months    Encounter for surveillance of injectable contraceptive       * medroxyPROGESTERone 150 MG/ML injection    DEPO-PROVERA    150 mL    Inject 1 mL (150 mg) into the muscle every 3 months    On Depo-Provera for contraception       * Notice:  This list has 2 medication(s) that are the same as other medications prescribed for you. Read the directions carefully, and ask your doctor or other care provider to review them with you.

## 2018-12-08 NOTE — PROGRESS NOTES
The following medication was given:     MEDICATION: Depo Provera 150mg  ROUTE: IM  SITE: [facility]  DOSE: 150mg/mL  LOT #: [id number]  :  FlixChip   EXPIRATION DATE:  06/2020  NDC#: [address]-2  Next Depo due 10/19-11/2  Juhi Gipson       
No

## 2019-01-18 ENCOUNTER — APPOINTMENT (OUTPATIENT)
Dept: OCCUPATIONAL MEDICINE | Facility: OTHER | Age: 29
End: 2019-01-18

## 2019-01-21 DIAGNOSIS — Z78.9 USES BIRTH CONTROL: Primary | ICD-10-CM

## 2019-01-21 RX ORDER — MEDROXYPROGESTERONE ACETATE 150 MG/ML
150 INJECTION, SUSPENSION INTRAMUSCULAR
Status: CANCELLED | OUTPATIENT
Start: 2019-01-25 | End: 2020-01-19

## 2019-01-21 NOTE — Clinical Note
Additional orders are needed for Depo Provera.  They are prepared for you to sign, should you wish the patient to continue.  She is scheduled for an injection on Friday of this week.Thanks, you.Gladis Flores

## 2019-01-21 NOTE — Clinical Note
Additional orders are needed for Depo Provera injections.  She will be in this Friday for her injection.  Last injection was 10/26/18.  This order fulfills the MAR update.Thank you.Gladis Flores RN on 1/21/2019 at 8:59 AM

## 2019-01-22 RX ORDER — MEDROXYPROGESTERONE ACETATE 150 MG/ML
150 INJECTION, SUSPENSION INTRAMUSCULAR
Status: DISCONTINUED | OUTPATIENT
Start: 2019-01-22 | End: 2019-05-01

## 2019-01-24 ENCOUNTER — APPOINTMENT (OUTPATIENT)
Dept: OCCUPATIONAL MEDICINE | Facility: OTHER | Age: 29
End: 2019-01-24

## 2019-01-25 ENCOUNTER — ALLIED HEALTH/NURSE VISIT (OUTPATIENT)
Dept: ALLERGY | Facility: OTHER | Age: 29
End: 2019-01-25
Attending: NURSE PRACTITIONER

## 2019-01-25 PROCEDURE — 96372 THER/PROPH/DIAG INJ SC/IM: CPT

## 2019-01-25 RX ADMIN — MEDROXYPROGESTERONE ACETATE 150 MG: 150 INJECTION, SUSPENSION INTRAMUSCULAR at 08:49

## 2019-01-25 NOTE — PROGRESS NOTES
Prior to injection, verified patient identity using patient's name and date of birth.  Due to injection administration, patient instructed to remain in clinic for 15 minutes  afterwards, and to report any adverse reaction to me immediately.    BP: Data Unavailable    LAST PAP/EXAM:   Lab Results   Component Value Date    PAP NIL 11/17/2017     URINE HCG:not indicated    NEXT INJECTION DUE: 4/12/19 - 4/26/19       Drug Amount Wasted:  None.  Vial/Syringe: Single dose vial  Expiration Date:  04/2021  QUIRINO GARCIA

## 2019-02-06 ENCOUNTER — APPOINTMENT (OUTPATIENT)
Dept: OCCUPATIONAL MEDICINE | Facility: OTHER | Age: 29
End: 2019-02-06

## 2019-04-29 NOTE — PROGRESS NOTES
SUBJECTIVE:   Destini Quintanilla is a 28 year old female who presents to clinic today for the following   health issues:      New Patient/Transfer of Care  At this time, past medical history, current medications, allergies and drug sensitivities, immunizations, habits and life style, family history, and social history are reviewed and updated. She is due for an HIV screen. Will collect when we obtain labs in the future. No risks.     Contraception:   She currently is using the depo injection. Denies any breakthrough bleeding. No dyspareunia. She is sexually active. Obtains at least 3 servings of calcium daily along with multivitamin with calcium. Last pap was done in 11/2017 and was negative. No chest pain or shortness of breath. Feels well.     Additional history: as documented    Reviewed  and updated as needed this visit by clinical staff  Tobacco  Allergies  Meds  Med Hx  Surg Hx  Fam Hx         Reviewed and updated as needed this visit by Provider  Med Hx  Surg Hx  Fam Hx         Patient Active Problem List   Diagnosis     Uses birth control     History reviewed. No pertinent surgical history.    Social History     Tobacco Use     Smoking status: Never Smoker     Smokeless tobacco: Never Used   Substance Use Topics     Alcohol use: Yes     Alcohol/week: 0.5 oz     Types: 1 Standard drinks or equivalent per week     Comment: occasional     Family History   Problem Relation Age of Onset     Cancer Maternal Grandmother      Cancer Maternal Grandfather         liver     Heart Disease Paternal Grandmother      Heart Disease Paternal Grandfather          Current Outpatient Medications   Medication Sig Dispense Refill     desoximetasone (TOPICORT) 0.25 % cream Apply topically 2 times daily 15 g 1     Allergies   Allergen Reactions     No Known Drug Allergy        ROS:  As noted in the HPI.     OBJECTIVE:     /66 (BP Location: Left arm, Patient Position: Sitting, Cuff Size: Adult Large)   Pulse 91    "Temp 99  F (37.2  C) (Tympanic)   Ht 1.657 m (5' 5.25\")   Wt 129.3 kg (285 lb)   SpO2 98%   BMI 47.06 kg/m    Body mass index is 47.06 kg/m .  GENERAL: alert, no distress and obese  RESP: lungs clear to auscultation - no rales, rhonchi or wheezes  CV: regular rate and rhythm, normal S1 S2, no S3 or S4, no murmur  PSYCH: mentation appears normal, affect normal/bright    Diagnostic Test Results:  Results for orders placed or performed in visit on 05/01/19 (from the past 24 hour(s))   HCG Qual, Urine (AAT8034)   Result Value Ref Range    HCG Qual Urine Negative NEG^Negative       ASSESSMENT/PLAN:   (Z30.9) Uses birth control  (primary encounter diagnosis)  Comment: tolerating depo, would like to continue, urine HCG negative  Plan: Depo given. Follow up every 3 months for repeat injection. F/u 2020 for repeat pap.     (Z76.89) Encounter to establish care  Plan: Patient is in need of a new provider. she has been explained the role of a CNP and the fact that I do not follow patients in the hospital. she was told that should he get admitted, he would then be followed by a hospitalist. she verbalizes understanding and would like to establish a relationship now.        Mona Reid NP  [facility] [place]      "

## 2019-05-01 ENCOUNTER — OFFICE VISIT (OUTPATIENT)
Dept: FAMILY MEDICINE | Facility: OTHER | Age: 29
End: 2019-05-01
Attending: NURSE PRACTITIONER
Payer: COMMERCIAL

## 2019-05-01 VITALS
SYSTOLIC BLOOD PRESSURE: 104 MMHG | OXYGEN SATURATION: 98 % | DIASTOLIC BLOOD PRESSURE: 66 MMHG | TEMPERATURE: 99 F | HEIGHT: 65 IN | HEART RATE: 91 BPM | WEIGHT: 285 LBS | BODY MASS INDEX: 47.48 KG/M2

## 2019-05-01 DIAGNOSIS — Z78.9 USES BIRTH CONTROL: Primary | ICD-10-CM

## 2019-05-01 DIAGNOSIS — Z76.89 ENCOUNTER TO ESTABLISH CARE: ICD-10-CM

## 2019-05-01 LAB — HCG UR QL: NEGATIVE

## 2019-05-01 PROCEDURE — 99212 OFFICE O/P EST SF 10 MIN: CPT | Performed by: NURSE PRACTITIONER

## 2019-05-01 PROCEDURE — 81025 URINE PREGNANCY TEST: CPT | Performed by: NURSE PRACTITIONER

## 2019-05-01 RX ORDER — MEDROXYPROGESTERONE ACETATE 150 MG/ML
150 INJECTION, SUSPENSION INTRAMUSCULAR
Status: ACTIVE | OUTPATIENT
Start: 2019-05-01 | End: 2020-04-25

## 2019-05-01 RX ADMIN — MEDROXYPROGESTERONE ACETATE 150 MG: 150 INJECTION, SUSPENSION INTRAMUSCULAR at 14:21

## 2019-05-01 ASSESSMENT — PAIN SCALES - GENERAL: PAINLEVEL: NO PAIN (0)

## 2019-05-01 ASSESSMENT — MIFFLIN-ST. JEOR: SCORE: 2022.59

## 2019-05-01 NOTE — NURSING NOTE
"Prior to injection, verified patient identity using patient's name and date of birth.      BP: 104/66    LAST PAP/EXAM:   Lab Results   Component Value Date    PAP NIL 11/17/2020     URINE HCG:negative    NEXT INJECTION DUE: 7/17/19 - 7/31/19       Drug Amount Wasted:  None.  Vial/Syringe: Single dose vial  Expiration Date:  04/2021    Diann \"Salvador\" JENNI Barragan    "

## 2019-05-01 NOTE — NURSING NOTE
"Chief Complaint   Patient presents with     Establish Care     Contraception       Initial /66 (BP Location: Left arm, Patient Position: Sitting, Cuff Size: Adult Large)   Pulse 91   Temp 99  F (37.2  C) (Tympanic)   Ht 1.657 m (5' 5.25\")   Wt 129.3 kg (285 lb)   SpO2 98%   BMI 47.06 kg/m   Estimated body mass index is 47.06 kg/m  as calculated from the following:    Height as of this encounter: 1.657 m (5' 5.25\").    Weight as of this encounter: 129.3 kg (285 lb).  Medication Reconciliation: complete    Thu Maddox LPN  "

## 2019-05-24 ENCOUNTER — OFFICE VISIT (OUTPATIENT)
Dept: FAMILY MEDICINE | Facility: OTHER | Age: 29
End: 2019-05-24
Attending: NURSE PRACTITIONER
Payer: COMMERCIAL

## 2019-05-24 VITALS
WEIGHT: 286 LBS | HEIGHT: 67 IN | HEART RATE: 98 BPM | BODY MASS INDEX: 44.89 KG/M2 | DIASTOLIC BLOOD PRESSURE: 68 MMHG | SYSTOLIC BLOOD PRESSURE: 110 MMHG | OXYGEN SATURATION: 95 %

## 2019-05-24 DIAGNOSIS — Z11.4 SCREENING FOR HIV (HUMAN IMMUNODEFICIENCY VIRUS): ICD-10-CM

## 2019-05-24 DIAGNOSIS — Z13.220 LIPID SCREENING: ICD-10-CM

## 2019-05-24 DIAGNOSIS — Z00.00 HEALTH MAINTENANCE EXAMINATION: Primary | ICD-10-CM

## 2019-05-24 DIAGNOSIS — Z13.1 SCREENING FOR DIABETES MELLITUS: ICD-10-CM

## 2019-05-24 DIAGNOSIS — E66.01 MORBID OBESITY (H): ICD-10-CM

## 2019-05-24 PROCEDURE — 99395 PREV VISIT EST AGE 18-39: CPT | Performed by: NURSE PRACTITIONER

## 2019-05-24 PROCEDURE — 87389 HIV-1 AG W/HIV-1&-2 AB AG IA: CPT | Mod: 90 | Performed by: NURSE PRACTITIONER

## 2019-05-24 PROCEDURE — 36415 COLL VENOUS BLD VENIPUNCTURE: CPT | Performed by: NURSE PRACTITIONER

## 2019-05-24 PROCEDURE — 99000 SPECIMEN HANDLING OFFICE-LAB: CPT | Performed by: NURSE PRACTITIONER

## 2019-05-24 ASSESSMENT — PAIN SCALES - GENERAL: PAINLEVEL: NO PAIN (0)

## 2019-05-24 ASSESSMENT — MIFFLIN-ST. JEOR: SCORE: 2054.92

## 2019-05-24 NOTE — PROGRESS NOTES
SUBJECTIVE:   CC: Destini Quintanilla is an 28 year old woman who presents for preventive health visit.     Healthy Habits:    Do you get at least three servings of calcium containing foods daily (dairy, green leafy vegetables, etc.)? yes    Amount of exercise or daily activities, outside of work: 1 day(s) per week    Problems taking medications regularly No    Medication side effects: No    Have you had an eye exam in the past two years? yes    Do you see a dentist twice per year? No, will schedule    Do you have sleep apnea, excessive snoring or daytime drowsiness? no      Today's PHQ-2 Score:   PHQ-2 ( 1999 [employer]) 5/24/2019 5/1/2019   Q1: Little interest or pleasure in doing things 0 0   Q2: Feeling down, depressed or hopeless 0 0   PHQ-2 Score 0 0     Abuse: Current or Past(Physical, Sexual or Emotional)- No  Do you feel safe in your environment? Yes    Social History     Tobacco Use     Smoking status: Never Smoker     Smokeless tobacco: Never Used   Substance Use Topics     Alcohol use: Yes     Alcohol/week: 0.5 oz     Types: 1 Standard drinks or equivalent per week     Comment: occasional     If you drink alcohol do you typically have >3 drinks per day or >7 drinks per week? No                     Reviewed orders with patient.  Reviewed health maintenance and updated orders accordingly - Yes  BP Readings from Last 3 Encounters:   05/24/19 110/68   05/01/19 104/66   11/17/17 116/68    Wt Readings from Last 3 Encounters:   05/24/19 129.7 kg (286 lb)   05/01/19 129.3 kg (285 lb)   11/17/17 121.6 kg (268 lb)                  Patient Active Problem List   Diagnosis     Uses birth control     History reviewed. No pertinent surgical history.    Social History     Tobacco Use     Smoking status: Never Smoker     Smokeless tobacco: Never Used   Substance Use Topics     Alcohol use: Yes     Alcohol/week: 0.5 oz     Types: 1 Standard drinks or equivalent per week     Comment: occasional     Family History   Problem  "Relation Age of Onset     Cancer Maternal Grandmother      Cancer Maternal Grandfather         liver     Heart Disease Paternal Grandmother      Heart Disease Paternal Grandfather          No current outpatient medications on file.       Mammogram not appropriate for this patient based on age.      History of abnormal Pap smear: NO - age 21-29 PAP every 3 years recommended  She is sexually active, no concern about STDs.   PAP / HPV 11/17/2017 9/5/2014   PAP NIL NIL     Reviewed and updated as needed this visit by clinical staff  Tobacco  Allergies  Meds  Med Hx  Surg Hx  Fam Hx  Soc Hx        Reviewed and updated as needed this visit by Provider  Med Hx  Surg Hx  Fam Hx          ROS:  CONSTITUTIONAL: NEGATIVE for fever, chills, change in weight  INTEGUMENTARU/SKIN: NEGATIVE for worrisome rashes, moles or lesions  EYES: NEGATIVE for vision changes or irritation  ENT: NEGATIVE for ear, mouth and throat problems  RESP: NEGATIVE for significant cough or SOB  BREAST: NEGATIVE for masses, tenderness or discharge  CV: NEGATIVE for chest pain, palpitations or peripheral edema  GI: NEGATIVE for nausea, abdominal pain, heartburn, or change in bowel habits  : NEGATIVE for unusual urinary or vaginal symptoms. Periods are regular.  MUSCULOSKELETAL: NEGATIVE for significant arthralgias or myalgia  NEURO: NEGATIVE for weakness, dizziness or paresthesias  PSYCHIATRIC: NEGATIVE for changes in mood or affect    OBJECTIVE:   /68 (BP Location: Left arm, Patient Position: Chair, Cuff Size: Adult Large)   Pulse 98   Ht 1.702 m (5' 7\")   Wt 129.7 kg (286 lb)   SpO2 95%   BMI 44.79 kg/m    EXAM:  GENERAL: alert, no distress and obese  EYES: Eyes grossly normal to inspection, PERRL and conjunctivae and sclerae normal  HENT: ear canals and TM's normal, nose and mouth without ulcers or lesions  NECK: no adenopathy, no asymmetry, masses, or scars and thyroid normal to palpation  RESP: lungs clear to auscultation - no " "rales, rhonchi or wheezes  BREAST: normal without masses, tenderness or nipple discharge and no palpable axillary masses or adenopathy  CV: regular rate and rhythm, normal S1 S2, no S3 or S4, no murmur  ABDOMEN: soft, nontender, no hepatosplenomegaly, no masses and bowel sounds normal   (female): deferred  MS: no gross musculoskeletal defects noted, no edema  SKIN: no suspicious lesions or rashes  NEURO: Normal strength and tone, mentation intact and speech normal  PSYCH: mentation appears normal, affect normal/bright  LYMPH: no cervical, supraclavicular, axillary, or inguinal adenopathy    Diagnostic Test Results:  none     ASSESSMENT/PLAN:   (Z00.00) Health maintenance examination  (primary encounter diagnosis)  Comment: Pap is UTD. Would like fasting labs as she is overweight and worries about hyperlipidemia and DM. No need for a mammogram until age 39.   Plan: Fasting labs ordered. She will return as she has not fasted today. Will notify patient of the results when available and intervene accordingly.       COUNSELING:   Reviewed preventive health counseling, as reflected in patient instructions       Regular exercise       Healthy diet/nutrition       Vision screening       Contraception    Estimated body mass index is 44.79 kg/m  as calculated from the following:    Height as of this encounter: 1.702 m (5' 7\").    Weight as of this encounter: 129.7 kg (286 lb).    Weight management plan: Discussed healthy diet and exercise guidelines     reports that she has never smoked. She has never used smokeless tobacco.      Counseling Resources:  ATP IV Guidelines  Pooled Cohorts Equation Calculator  Breast Cancer Risk Calculator  FRAX Risk Assessment  ICSI Preventive Guidelines  Dietary Guidelines for Americans, 2010  USDA's MyPlate  ASA Prophylaxis  Lung CA Screening    Mona Reid NP  [facility] [place]  "

## 2019-05-24 NOTE — NURSING NOTE
"Chief Complaint   Patient presents with     Physical       Initial /68 (BP Location: Left arm, Patient Position: Chair, Cuff Size: Adult Large)   Pulse 98   Ht 1.702 m (5' 7\")   Wt 129.7 kg (286 lb)   SpO2 95%   BMI 44.79 kg/m   Estimated body mass index is 44.79 kg/m  as calculated from the following:    Height as of this encounter: 1.702 m (5' 7\").    Weight as of this encounter: 129.7 kg (286 lb).  Medication Reconciliation: complete    Josefina Rothman LPN  "

## 2019-05-29 DIAGNOSIS — Z13.1 SCREENING FOR DIABETES MELLITUS: ICD-10-CM

## 2019-05-29 DIAGNOSIS — Z13.220 LIPID SCREENING: ICD-10-CM

## 2019-05-29 LAB
ANION GAP SERPL CALCULATED.3IONS-SCNC: 4 MMOL/L (ref 3–14)
BUN SERPL-MCNC: 10 MG/DL (ref 7–30)
CALCIUM SERPL-MCNC: 8.7 MG/DL (ref 8.5–10.1)
CHLORIDE SERPL-SCNC: 108 MMOL/L (ref 94–109)
CHOLEST SERPL-MCNC: 162 MG/DL
CO2 SERPL-SCNC: 27 MMOL/L (ref 20–32)
CREAT SERPL-MCNC: 0.63 MG/DL (ref 0.52–1.04)
GFR SERPL CREATININE-BSD FRML MDRD: >90 ML/MIN/{1.73_M2}
GLUCOSE SERPL-MCNC: 85 MG/DL (ref 70–99)
HDLC SERPL-MCNC: 56 MG/DL
HIV 1+2 AB+HIV1 P24 AG SERPL QL IA: NONREACTIVE
LDLC SERPL CALC-MCNC: 92 MG/DL
NONHDLC SERPL-MCNC: 106 MG/DL
POTASSIUM SERPL-SCNC: 4.3 MMOL/L (ref 3.4–5.3)
SODIUM SERPL-SCNC: 139 MMOL/L (ref 133–144)
TRIGL SERPL-MCNC: 71 MG/DL

## 2019-05-29 PROCEDURE — 80061 LIPID PANEL: CPT | Performed by: NURSE PRACTITIONER

## 2019-05-29 PROCEDURE — 36415 COLL VENOUS BLD VENIPUNCTURE: CPT | Performed by: NURSE PRACTITIONER

## 2019-05-29 PROCEDURE — 80048 BASIC METABOLIC PNL TOTAL CA: CPT | Performed by: NURSE PRACTITIONER

## 2019-07-17 ENCOUNTER — ALLIED HEALTH/NURSE VISIT (OUTPATIENT)
Dept: FAMILY MEDICINE | Facility: OTHER | Age: 29
End: 2019-07-17
Attending: NURSE PRACTITIONER
Payer: COMMERCIAL

## 2019-07-17 DIAGNOSIS — Z78.9 USES BIRTH CONTROL: Primary | ICD-10-CM

## 2019-07-17 PROCEDURE — 96372 THER/PROPH/DIAG INJ SC/IM: CPT

## 2019-07-17 RX ADMIN — MEDROXYPROGESTERONE ACETATE 150 MG: 150 INJECTION, SUSPENSION INTRAMUSCULAR at 14:53

## 2019-07-17 NOTE — NURSING NOTE
Clinic Administered Medication Documentation      Depo Provera Documentation    Prior to injection, verified patient identity using patient's name and date of birth. Medication was administered. Please see MAR and medication order for additional information. Patient instructed to remain in clinic for 15 minutes.    BP: Data Unavailable    LAST PAP/EXAM:   Lab Results   Component Value Date    PAP NIL 11/17/2017     URINE HCG:not indicated    NEXT INJECTION DUE: 10/2/19 - 10/16/19    Was entire vial of medication used? Yes  Vial/Syringe: Single dose vial  Expiration Date:  07/2021

## 2019-09-05 ENCOUNTER — OFFICE VISIT (OUTPATIENT)
Dept: FAMILY MEDICINE | Facility: OTHER | Age: 29
End: 2019-09-05
Attending: NURSE PRACTITIONER
Payer: COMMERCIAL

## 2019-09-05 VITALS
WEIGHT: 290 LBS | DIASTOLIC BLOOD PRESSURE: 64 MMHG | OXYGEN SATURATION: 99 % | TEMPERATURE: 98.1 F | BODY MASS INDEX: 45.52 KG/M2 | HEIGHT: 67 IN | HEART RATE: 93 BPM | SYSTOLIC BLOOD PRESSURE: 114 MMHG

## 2019-09-05 DIAGNOSIS — J01.00 SUBACUTE MAXILLARY SINUSITIS: Primary | ICD-10-CM

## 2019-09-05 PROCEDURE — 99213 OFFICE O/P EST LOW 20 MIN: CPT | Performed by: NURSE PRACTITIONER

## 2019-09-05 RX ORDER — FLUTICASONE PROPIONATE 50 MCG
1 SPRAY, SUSPENSION (ML) NASAL DAILY
Qty: 9.9 ML | Refills: 0 | Status: SHIPPED | OUTPATIENT
Start: 2019-09-05 | End: 2019-09-23

## 2019-09-05 RX ORDER — AZITHROMYCIN 250 MG/1
TABLET, FILM COATED ORAL
Qty: 6 TABLET | Refills: 0 | Status: SHIPPED | OUTPATIENT
Start: 2019-09-05 | End: 2019-09-23

## 2019-09-05 ASSESSMENT — ENCOUNTER SYMPTOMS
SINUS PRESSURE: 1
MUSCULOSKELETAL NEGATIVE: 1
COUGH: 1
RHINORRHEA: 1
SHORTNESS OF BREATH: 0
VOMITING: 0
FEVER: 1
NAUSEA: 0
CARDIOVASCULAR NEGATIVE: 1
SORE THROAT: 0
DIZZINESS: 0
SINUS PAIN: 0
HEADACHES: 1
FATIGUE: 1
DIARRHEA: 0

## 2019-09-05 ASSESSMENT — MIFFLIN-ST. JEOR: SCORE: 2068.06

## 2019-09-05 ASSESSMENT — PAIN SCALES - GENERAL: PAINLEVEL: NO PAIN (1)

## 2019-09-05 NOTE — NURSING NOTE
"Chief Complaint   Patient presents with     Sinus Problem       Initial /64 (BP Location: Left arm, Patient Position: Chair, Cuff Size: Adult Large)   Pulse 93   Temp 98.1  F (36.7  C) (Tympanic)   Ht 1.702 m (5' 7\")   Wt 131.5 kg (290 lb)   SpO2 99%   BMI 45.42 kg/m   Estimated body mass index is 45.42 kg/m  as calculated from the following:    Height as of this encounter: 1.702 m (5' 7\").    Weight as of this encounter: 131.5 kg (290 lb).  Medication Reconciliation: complete  "

## 2019-09-05 NOTE — PROGRESS NOTES
Subjective     Destini Quintanilla is a 29 year old female who presents to clinic today for the following health issues:    HPI   Sinus Problems      Duration: 1 week     Description  nasal congestion, sore throat, facial pain/pressure, cough, chills and fatigue/malaise    Severity: moderate    Accompanying signs and symptoms: as above  Postnasal drip      History (predisposing factors):  Hx nasal allergies.      Precipitating or alleviating factors: None    Therapies tried and outcome:  rest and fluids        Patient Active Problem List   Diagnosis     Uses birth control     Morbid obesity (H)     No past surgical history on file.    Social History     Tobacco Use     Smoking status: Never Smoker     Smokeless tobacco: Never Used   Substance Use Topics     Alcohol use: Yes     Alcohol/week: 0.5 oz     Types: 1 Standard drinks or equivalent per week     Comment: occasional     Family History   Problem Relation Age of Onset     Cancer Maternal Grandmother      Cancer Maternal Grandfather         liver     Heart Disease Paternal Grandmother      Heart Disease Paternal Grandfather          Current Outpatient Medications   Medication Sig Dispense Refill     azithromycin (ZITHROMAX) 250 MG tablet Take 2 tablets (500 mg) by mouth daily for 1 day, THEN 1 tablet (250 mg) daily for 4 days. 6 tablet 0     fluticasone (FLONASE) 50 MCG/ACT nasal spray Spray 1 spray into both nostrils daily 9.9 mL 0     Allergies   Allergen Reactions     No Known Drug Allergy        On Depo Provera  birth control    Reviewed and updated as needed this visit by Provider         Review of Systems   Constitutional: Positive for fatigue and fever.   HENT: Positive for congestion, postnasal drip, rhinorrhea and sinus pressure. Negative for ear pain, sinus pain and sore throat.    Respiratory: Positive for cough. Negative for shortness of breath.    Cardiovascular: Negative.    Gastrointestinal: Negative for diarrhea, nausea and vomiting.  "  Genitourinary: Negative.    Musculoskeletal: Negative.    Skin: Negative.    Neurological: Positive for headaches. Negative for dizziness.      /64 (BP Location: Left arm, Patient Position: Chair, Cuff Size: Adult Large)   Pulse 93   Temp 98.1  F (36.7  C) (Tympanic)   Ht 1.702 m (5' 7\")   Wt 131.5 kg (290 lb)   SpO2 99%   BMI 45.42 kg/m          Objective    Physical Exam   Constitutional: She is oriented to person, place, and time. She appears well-developed and well-nourished. No distress.   HENT:   Right Ear: External ear normal.   Left Ear: External ear normal.   Mouth/Throat: Oropharynx is clear and moist.   Nares red , swollen.     Eyes: Pupils are equal, round, and reactive to light. Conjunctivae and EOM are normal.   Neck: Normal range of motion. Neck supple. No thyromegaly present.   Cardiovascular: Normal rate, regular rhythm and normal heart sounds.   No murmur heard.  Pulmonary/Chest: Effort normal and breath sounds normal.   Musculoskeletal: Normal range of motion. She exhibits no edema.   Lymphadenopathy:     She has no cervical adenopathy.   Neurological: She is alert and oriented to person, place, and time. No cranial nerve deficit.   Skin: Skin is warm and dry.   Psychiatric: She has a normal mood and affect.      ASSESSMENT / PLAN:  (J01.00) Subacute maxillary sinusitis  (primary encounter diagnosis)  Comment: Zpak 500mg today, 250mg a day for 4 days. Drink plenty fluids,  Flonase 1 spray BID for 3-5 days.    Plan: azithromycin (ZITHROMAX) 250 MG tablet,         fluticasone (FLONASE) 50 MCG/ACT nasal spray                "

## 2019-09-05 NOTE — PATIENT INSTRUCTIONS
1. Zpak   500mg today  And 250mg a day for 4 days  2. Drink plenty fluids  3. Flonase spray  2 times a day fo 3-5 days

## 2019-09-18 NOTE — PROGRESS NOTES
"Destini Quintanilla is presenting for evaluation of medical weight management. She has had weight problems for her entire life. She notes that she had weight problems as a child. Mother is also heavy. Maximum weight is 290 which is today's weight. She is unsure how much she wants to lose, but wants her clothes to fit better. Other methods the patient has tried to lose weight include diet and exercise and smaller portions.     Processed Foods: Daily, frozen ravioli, instant potatoes, chips  Alcohol: Weekends-mitch and coke  Soda/pop: Weekends-regular coke  Meals Prepared by: Self and fiance   Psychologic issues: No, no anxiety or depression  Family Support: Yes  Activity/Exercise: During work hours  Insight/Motivation: Wedding in 2020  Sleep habits: Sleeps well  Cravings: Sweets  Appetite: Depends on the day  Medications contributing to weight issues: Depo    Co-morbidities   Patient Active Problem List   Diagnosis     Uses birth control     Morbid obesity (H)       Meds:     Current Outpatient Medications   Medication     fluticasone (FLONASE) 50 MCG/ACT nasal spray     Current Facility-Administered Medications   Medication     medroxyPROGESTERone (DEPO-PROVERA) syringe 150 mg       No current facility-administered medications for this visit.     Current Eating Patterns:   Generally 3 meals per day.   Breakfast: waffle with jelly, cereal, granola bar, water  Midmorning snack: Sometimes-usually a pop tart or granola  Lunch: left over pasta or meat and potatoes  Mid afternoon snack: No  Supper: meat and potatoes, pasta  Grazing: Sometimes    OBJECTIVE:   /60 (BP Location: Left arm, Patient Position: Sitting, Cuff Size: Adult Large)   Pulse 84   Temp 97.8  F (36.6  C) (Tympanic)   Ht 1.702 m (5' 7\")   Wt 131.5 kg (290 lb)   SpO2 98%   BMI 45.42 kg/m      Physical Exam   Exam:  Constitutional: alert, no distress and over weight  Psychiatric: mentation appears normal and affect normal/bright      ASSESSMENT: "   Obesity Stage .Body mass index is 45.  Recent fasting glucose normal.   TSH normal in 2017.     Protein Goal: 70-90 grams  Carb Limit: less than 50 grams  Fat Goal: 60-70% of calories  Calorie Goal: 1200    PLAN: Initiated a low carb diet. Will avoid pasta, corn, potatoes, and soda. Went over protein/carbohydrate/fat recommendations. Will also begin low dose phentermine. She was made aware of the side effects including myocardial ischemia and pulmonary hypertension. Stimulant agreement updated. Reminded patient to focus on getting appropriate amounts of protein. Discussed the rational for eating higher fat. This equates to around 60- 70% fat. Limit or avoid sugar, starches, bread products, rice and cereal, most grains and simple carbohydrates. Goal set for 6 lb weight loss over the next 4 weeks. F/U in 4 weeks with myself. 30 minutes spent in direct patient care essentially 100% in counseling on weight management and lifestyle change.

## 2019-09-23 ENCOUNTER — OFFICE VISIT (OUTPATIENT)
Dept: FAMILY MEDICINE | Facility: OTHER | Age: 29
End: 2019-09-23
Attending: NURSE PRACTITIONER
Payer: COMMERCIAL

## 2019-09-23 VITALS
HEIGHT: 67 IN | SYSTOLIC BLOOD PRESSURE: 112 MMHG | OXYGEN SATURATION: 98 % | HEART RATE: 84 BPM | TEMPERATURE: 97.8 F | BODY MASS INDEX: 45.52 KG/M2 | DIASTOLIC BLOOD PRESSURE: 60 MMHG | WEIGHT: 290 LBS

## 2019-09-23 DIAGNOSIS — Z71.3 DIETARY COUNSELING AND SURVEILLANCE: ICD-10-CM

## 2019-09-23 DIAGNOSIS — E66.01 MORBID OBESITY (H): Primary | ICD-10-CM

## 2019-09-23 PROCEDURE — 99213 OFFICE O/P EST LOW 20 MIN: CPT | Performed by: NURSE PRACTITIONER

## 2019-09-23 RX ORDER — PHENTERMINE HYDROCHLORIDE 37.5 MG/1
18.25 TABLET ORAL
Qty: 30 TABLET | Refills: 0 | Status: SHIPPED | OUTPATIENT
Start: 2019-09-23 | End: 2019-11-26

## 2019-09-23 ASSESSMENT — MIFFLIN-ST. JEOR: SCORE: 2068.06

## 2019-09-23 ASSESSMENT — PAIN SCALES - GENERAL: PAINLEVEL: NO PAIN (0)

## 2019-09-23 NOTE — NURSING NOTE
"Chief Complaint   Patient presents with     Consult     Weight       Initial /60 (BP Location: Left arm, Patient Position: Sitting, Cuff Size: Adult Large)   Pulse 84   Temp 97.8  F (36.6  C) (Tympanic)   Ht 1.702 m (5' 7\")   Wt 131.5 kg (290 lb)   SpO2 98%   BMI 45.42 kg/m   Estimated body mass index is 45.42 kg/m  as calculated from the following:    Height as of this encounter: 1.702 m (5' 7\").    Weight as of this encounter: 131.5 kg (290 lb).  Medication Reconciliation: complete     Jessika Woody LPN    "

## 2019-09-23 NOTE — LETTER
[facility]  09/23/19    Patient: Destini Quintanilla  YOB: 1990  Medical Record Number: [id number]  CSN: [id number]                                                                              Non-opioid Controlled Substance Agreement    I understand that my care provider has prescribed a controlled substance to help manage my condition(s). I am taking this medicine to help me function or work. I know this is strong medicine, and that it can cause serious side effects. Controlled substances can be sedating, addicting and may cause a dependency on the drug. They can affect my ability to drive or think, and cause depression. They need to be taken exactly as prescribed. Combining controlled substances with certain medicines or chemicals (such as cocaine, sedatives and tranquilizers, sleeping pills, meth) can be dangerous or even fatal. Also, if I stop controlled substances suddenly, I may have severe withdrawal symptoms.  If not helpful, I may be asked to stop them.    The risks, benefits, and side effects of these medicine(s) were explained to me. I agree that:    1. I will take part in other treatments as advised by my care team. This may be psychiatry or counseling, physical therapy, behavioral therapy, group treatment or a referral to a pain clinic. I will reduce or stop my medicine when my care team tells me to do so.  2. I will take my medicines as prescribed. I will not change the dose or schedule unless my care team tells me to. There will be no refills if I  run out early.   I may be contactedwithout warning and asked to complete a urine drug test or pill count at any time.   3. I will keep all my appointments, and understand this is part of the monitoring of controlled substances. My care team may require an office visit for EVERY controlled substance refill. If I miss appointments or don t follow instructions, my care team may stop my medicine.  4. I will not ask other providers to  prescribe controlled substances, and I will not accept controlled substances from other people. If I need another prescribed controlled substance for a new reason, I will tell my care team within 1 business day.  5. I will use one pharmacy to fill all of my controlled substance prescriptions, and it is up to me to make sure that I do not run out of my medicines on weekends or holidays. If my care team is willing to refill my controlled substance prescription without a visit, I must request refills only during office hours, refills may take up to 3 days to process, and it may take up to 5 to 7 days for my medicine to be mailed and ready at my pharmacy. Prescriptions will not be mailed anywhere except my pharmacy.    6. I am responsible for my prescriptions. If the medicine/prescription is lost or stolen, it will not be replaced. I also agree not to share controlled substance medicines with anyone.              [facility]  09/23/19  Patient:  Destini Quintanilla  YOB: 1990  Medical Record Number: [id number]  [facility]: [id number]    7. I agree to not use ANY illegal or recreational drugs. This includes marijuana, cocaine, bath salts or other drugs. I agree not to use alcohol unless my care team says I may. I agree to give urine samples whenever asked. If I don t give a urine sample, the care team may stop my medicine.    8. If I enroll in the [place] Medical Marijuana program, I will tell my care team. I will also sign an agreement to share my medical records with my care team.    9. I will bring in my list of medicines (or my medicine bottles) each time I come to the clinic.   10. I will tell my care team right away if I become pregnant or have a new medical problem treated outside of my regular clinic.  11. I understand that this medicine can affect my thinking and judgment. It may be unsafe for me to drive, use machinery and do dangerous tasks. I will not do any of these things until I know how the  medicine affects me. If my dose changes, I will wait to see how it affects me. I will contact my care team if I have concerns about medicine side effects.    I understand that if I do not follow any of the conditions above, my prescriptions or treatment may be stopped.      I agree that my provider, clinic care team, and pharmacy may work with any city, state or federal law enforcement agency that investigates the misuse, sale, or other diversion of my controlled medicine. I will allow my provider to discuss my care with or share a copy of this agreement with any other treating provider, pharmacy or emergency room where I receive care. I agree to give up (waive) any right of privacy or confidentiality with respect to these consents.   I have read this agreement and have asked questions about anything I did not understand.    ____________________________________________________    ____________  ________  Patient signature                                                         Date      Time    ____________________________________________________     ____________  ________  Witness                                                          Date      Time    ____________________________________________________  Provider signature

## 2019-10-07 ENCOUNTER — ALLIED HEALTH/NURSE VISIT (OUTPATIENT)
Dept: FAMILY MEDICINE | Facility: OTHER | Age: 29
End: 2019-10-07
Attending: NURSE PRACTITIONER
Payer: COMMERCIAL

## 2019-10-07 DIAGNOSIS — Z78.9 USES BIRTH CONTROL: Primary | ICD-10-CM

## 2019-10-07 PROCEDURE — 96372 THER/PROPH/DIAG INJ SC/IM: CPT

## 2019-10-07 RX ORDER — MEDROXYPROGESTERONE ACETATE 150 MG/ML
150 INJECTION, SUSPENSION INTRAMUSCULAR
Status: COMPLETED | OUTPATIENT
Start: 2019-10-07 | End: 2020-06-03

## 2019-10-07 RX ADMIN — MEDROXYPROGESTERONE ACETATE 150 MG: 150 INJECTION, SUSPENSION INTRAMUSCULAR at 07:50

## 2019-10-07 NOTE — NURSING NOTE
Clinic Administered Medication Documentation    MEDICATION LIST:   Depo Provera Documentation    Prior to injection, verified patient identity using patient's name and date of birth. Medication was administered. Please see MAR and medication order for additional information. Patient instructed to remain in clinic for 15 minutes.    BP: Data Unavailable    LAST PAP/EXAM:   Lab Results   Component Value Date    PAP NIL 11/17/2017     URINE HCG:not indicated    NEXT INJECTION DUE: 12/23/19 - 1/6/20    Was entire vial of medication used? Yes  Vial/Syringe: Single dose vial  Expiration Date:  08/2021    Josefina Rothman LPN

## 2019-10-15 NOTE — PROGRESS NOTES
"Destini Quintanilla is presenting for follow up of medical weight management. She is here for a four week follow up. At our last visit, phentermine was started. Since that visit, her weight is down 8 pounds. Overall, she feels better.   Energy levels are better.   Perceived compliance with lifestyle changes has been good.   Appetite/Hunger issues: controlled  Goal: unsure actual number, wanting her clothes to fit better.   New issues: none.   ?  Current Eating Patterns: good. Generally 3 meals per day.  Breakfast: eggs, water   Lunch: turkey and cheese wrap or peanuts, water   Supper: meat and veggies  Snacks: wisps, low carb popsicle  Grazing: rare  Processed Foods: rare   Alcohol: mitch and water on the weekends   Wasted Calories: rare   Supplements: none, but willing to start vit D and a multivitamin   Meals Prepared by: self and fiance  Nutrition: low carb, high protein  ?  Diet diary reviewed and estimated daily caloric intake is 2500.  ?  Activity/Exercise: Compliance has been fair, doing cardio 1-2 times per week. Willing to increase to 3 times per week.    Co-morbidities:   Patient Active Problem List   Diagnosis     Uses birth control     Morbid obesity (H)     Meds:   Current Outpatient Medications   Medication     phentermine (ADIPEX-P) 37.5 MG tablet     Current Facility-Administered Medications   Medication     medroxyPROGESTERone (DEPO-PROVERA) injection 150 mg     medroxyPROGESTERone (DEPO-PROVERA) syringe 150 mg     Psychologic issues/Triggers: none  ?  Family Support: good, fiance also doing    ?  Insight/Motivation: good, wedding in 2020   ?  OBJECTIVE:   /70 (BP Location: Left arm, Patient Position: Chair, Cuff Size: Adult Large)   Pulse 84   Ht 1.702 m (5' 7\")   Wt 127.9 kg (282 lb)   SpO2 98%   BMI 44.17 kg/m       Obesity Stage morbid  Down 8 pounds in the past 4 weeks  ?  ASSESSMENT:   Doing very well. Feeling good.   Down 8 pounds since 9/23/19.   On phentermine and denies any side " effects.   Will begin vit D and multivitamin.   ?  PLAN:  Discussed in detail and reinforced continued dietary and activity modifications including diet and exercise logs. New goal set for 8# weight loss over the next 4 weeks. F/U in 4 weeks.  20 minutes spent in direct patient care essentially 100% in counseling.

## 2019-10-21 ENCOUNTER — OFFICE VISIT (OUTPATIENT)
Dept: FAMILY MEDICINE | Facility: OTHER | Age: 29
End: 2019-10-21
Attending: NURSE PRACTITIONER
Payer: COMMERCIAL

## 2019-10-21 VITALS
BODY MASS INDEX: 44.26 KG/M2 | HEIGHT: 67 IN | OXYGEN SATURATION: 98 % | HEART RATE: 84 BPM | SYSTOLIC BLOOD PRESSURE: 108 MMHG | WEIGHT: 282 LBS | DIASTOLIC BLOOD PRESSURE: 70 MMHG

## 2019-10-21 DIAGNOSIS — E66.01 MORBID OBESITY (H): Primary | ICD-10-CM

## 2019-10-21 DIAGNOSIS — Z71.3 DIETARY COUNSELING AND SURVEILLANCE: ICD-10-CM

## 2019-10-21 PROCEDURE — 99213 OFFICE O/P EST LOW 20 MIN: CPT | Performed by: NURSE PRACTITIONER

## 2019-10-21 ASSESSMENT — PAIN SCALES - GENERAL: PAINLEVEL: NO PAIN (0)

## 2019-10-21 ASSESSMENT — ANXIETY QUESTIONNAIRES
7. FEELING AFRAID AS IF SOMETHING AWFUL MIGHT HAPPEN: NOT AT ALL
IF YOU CHECKED OFF ANY PROBLEMS ON THIS QUESTIONNAIRE, HOW DIFFICULT HAVE THESE PROBLEMS MADE IT FOR YOU TO DO YOUR WORK, TAKE CARE OF THINGS AT HOME, OR GET ALONG WITH OTHER PEOPLE: NOT DIFFICULT AT ALL
GAD7 TOTAL SCORE: 0
5. BEING SO RESTLESS THAT IT IS HARD TO SIT STILL: NOT AT ALL
1. FEELING NERVOUS, ANXIOUS, OR ON EDGE: NOT AT ALL
2. NOT BEING ABLE TO STOP OR CONTROL WORRYING: NOT AT ALL
3. WORRYING TOO MUCH ABOUT DIFFERENT THINGS: NOT AT ALL
6. BECOMING EASILY ANNOYED OR IRRITABLE: NOT AT ALL

## 2019-10-21 ASSESSMENT — PATIENT HEALTH QUESTIONNAIRE - PHQ9
5. POOR APPETITE OR OVEREATING: NOT AT ALL
SUM OF ALL RESPONSES TO PHQ QUESTIONS 1-9: 0

## 2019-10-21 ASSESSMENT — MIFFLIN-ST. JEOR: SCORE: 2031.77

## 2019-10-21 NOTE — NURSING NOTE
"Chief Complaint   Patient presents with     Weight Check       Initial /70 (BP Location: Left arm, Patient Position: Chair, Cuff Size: Adult Large)   Pulse 104   Ht 1.702 m (5' 7\")   Wt 127.9 kg (282 lb)   SpO2 98%   BMI 44.17 kg/m   Estimated body mass index is 44.17 kg/m  as calculated from the following:    Height as of this encounter: 1.702 m (5' 7\").    Weight as of this encounter: 127.9 kg (282 lb).  Medication Reconciliation: complete  Josefina Rothman LPN  "

## 2019-10-22 ASSESSMENT — ANXIETY QUESTIONNAIRES: GAD7 TOTAL SCORE: 0

## 2019-11-25 NOTE — PROGRESS NOTES
"Destini Quintanilla is presenting for follow up of medical weight management. She is here for a 4 week follow up. Since then, her weight is down 2 pounds. She notes that she has been very stressed at work and has been baking more.   Energy levels are about the same.   Perceived compliance with lifestyle changes has been fair.   Appetite/Hunger issues: controlled  Goal: unsure actual number, wanting her clothes to fit better .   New issues: stress-eating more carbs.   ?  Current Eating Patterns: good. Generally 3 meals per day.  Breakfast: often skipping or eating waffles   Lunch: salads, meat and cheese   Supper: cauliflower soup, seafood, corn dog  Snacks: brownies, sweets  Grazing: rare  Processed Foods: some   Alcohol: mitch and diet coke or water on the weekends   Wasted Calories: some, more than she has in the past   Supplements: multivitamin and vit D   Meals Prepared by: self  Nutrition: moderate carb, high protein  ?  Diet diary reviewed and estimated daily caloric intake is 2300.  ?  Activity/Exercise: Compliance has been poor, difficult with work schedule, working a lot of overtime. Willing to begin doing work out DVD 3 times weekly.    Co-morbidities:   Patient Active Problem List   Diagnosis     Uses birth control     Morbid obesity (H)     Meds:   Current Outpatient Medications   Medication     phentermine (ADIPEX-P) 37.5 MG tablet     Current Facility-Administered Medications   Medication     medroxyPROGESTERone (DEPO-PROVERA) injection 150 mg     medroxyPROGESTERone (DEPO-PROVERA) syringe 150 mg     Psychologic issues/Triggers: none  ?  Family Support: good, fiance also trying to limit carbs   ?  Insight/Motivation: good, wedding in 2020   ?  OBJECTIVE:   /70 (BP Location: Left arm, Patient Position: Chair, Cuff Size: Adult Large)   Pulse 95   Ht 1.702 m (5' 7\")   Wt 127 kg (280 lb)   SpO2 98%   BMI 43.85 kg/m      Obesity Stage morbid  Down another 2 pounds in the past 4 weeks. "   ?  ASSESSMENT:   -She notes that she has not been eating as well as of recently, but wants to get back on track. Very stressed with work-often having to work overtime.   -Still down 10 pounds since 9/23/19.   -On phentermine and denies any side effects. She is aware of the side effects and would like to continue.  ?  PLAN:  Discussed in detail and reinforced continued dietary and activity modifications including diet and exercise logs. New goal set for 6# weight loss over the next 4 weeks. F/U in 4 weeks.  20 minutes spent in direct patient care essentially 100% in counseling.

## 2019-11-26 ENCOUNTER — OFFICE VISIT (OUTPATIENT)
Dept: FAMILY MEDICINE | Facility: OTHER | Age: 29
End: 2019-11-26
Attending: NURSE PRACTITIONER
Payer: COMMERCIAL

## 2019-11-26 VITALS
WEIGHT: 280 LBS | DIASTOLIC BLOOD PRESSURE: 70 MMHG | HEART RATE: 95 BPM | HEIGHT: 67 IN | SYSTOLIC BLOOD PRESSURE: 110 MMHG | OXYGEN SATURATION: 98 % | BODY MASS INDEX: 43.95 KG/M2

## 2019-11-26 DIAGNOSIS — E66.01 MORBID OBESITY (H): Primary | ICD-10-CM

## 2019-11-26 DIAGNOSIS — Z71.3 DIETARY COUNSELING AND SURVEILLANCE: ICD-10-CM

## 2019-11-26 PROCEDURE — 99213 OFFICE O/P EST LOW 20 MIN: CPT | Performed by: NURSE PRACTITIONER

## 2019-11-26 RX ORDER — PHENTERMINE HYDROCHLORIDE 37.5 MG/1
18.25 TABLET ORAL
Qty: 30 TABLET | Refills: 0 | Status: SHIPPED | OUTPATIENT
Start: 2019-11-26 | End: 2020-03-10

## 2019-11-26 ASSESSMENT — PAIN SCALES - GENERAL: PAINLEVEL: NO PAIN (0)

## 2019-11-26 ASSESSMENT — MIFFLIN-ST. JEOR: SCORE: 2022.7

## 2019-11-26 NOTE — NURSING NOTE
"Chief Complaint   Patient presents with     Weight Check       Initial /70 (BP Location: Left arm, Patient Position: Chair, Cuff Size: Adult Large)   Pulse 95   Ht 1.702 m (5' 7\")   Wt 127 kg (280 lb)   SpO2 98%   BMI 43.85 kg/m   Estimated body mass index is 43.85 kg/m  as calculated from the following:    Height as of this encounter: 1.702 m (5' 7\").    Weight as of this encounter: 127 kg (280 lb).  Medication Reconciliation: complete  Josefina Rothman LPN  "

## 2019-12-31 ENCOUNTER — ALLIED HEALTH/NURSE VISIT (OUTPATIENT)
Dept: FAMILY MEDICINE | Facility: OTHER | Age: 29
End: 2019-12-31
Attending: NURSE PRACTITIONER
Payer: COMMERCIAL

## 2019-12-31 DIAGNOSIS — Z78.9 USES BIRTH CONTROL: Primary | ICD-10-CM

## 2019-12-31 PROCEDURE — 96372 THER/PROPH/DIAG INJ SC/IM: CPT

## 2019-12-31 RX ADMIN — MEDROXYPROGESTERONE ACETATE 150 MG: 150 INJECTION, SUSPENSION INTRAMUSCULAR at 07:38

## 2019-12-31 NOTE — PROGRESS NOTES
The following medication was given:     MEDICATION: Depo Provera 150mg  ROUTE: IM  SITE: [facility]  DOSE: 150 mg  LOT #: [id number]  :  [employer]   EXPIRATION DATE:  11/2021  NDC#: [address]-7

## 2020-02-13 ENCOUNTER — OFFICE VISIT (OUTPATIENT)
Dept: CHIROPRACTIC MEDICINE | Facility: OTHER | Age: 30
End: 2020-02-13
Attending: CHIROPRACTOR
Payer: COMMERCIAL

## 2020-02-13 DIAGNOSIS — M99.02 SEGMENTAL AND SOMATIC DYSFUNCTION OF THORACIC REGION: ICD-10-CM

## 2020-02-13 DIAGNOSIS — M54.50 ACUTE BILATERAL LOW BACK PAIN WITHOUT SCIATICA: ICD-10-CM

## 2020-02-13 DIAGNOSIS — M99.03 SEGMENTAL AND SOMATIC DYSFUNCTION OF LUMBAR REGION: Primary | ICD-10-CM

## 2020-02-13 PROCEDURE — 98940 CHIROPRACT MANJ 1-2 REGIONS: CPT | Mod: AT | Performed by: CHIROPRACTOR

## 2020-02-13 PROCEDURE — 99202 OFFICE O/P NEW SF 15 MIN: CPT | Mod: 25 | Performed by: CHIROPRACTOR

## 2020-02-13 NOTE — PROGRESS NOTES
Subjective Finding:    Chief compalint: Patient presents with:  Back Pain  , Pain Scale: 7/10, Intensity: sharp, Duration: 3 days, Radiating: no.    Date of injury:     Activities that the pain restricts:   Home/household/hobbies/social activities: yes.  Work duties: yes.  Sleep: yes.  Makes symptoms better: rest.  Makes symptoms worse: lumbar flexion.  Have you seen anyone else for the symptoms? No.  Work related: no.  Automobile related injury: no.    Objective and Assessment:    Posture Analysis:   High shoulder: .  Head tilt: .  High iliac crest: .  Head carriage: neutral.  Thoracic Kyphosis: neutral.  Lumbar Lordosis: forward.    Lumbar Range of Motion: extension decreased.  Cervical Range of Motion: .  Thoracic Range of Motion: .  Extremity Range of Motion: .    Palpation:   Piriformis: right, referred pain: no    Segmental dysfunction pre-treatment and treatment area: T7, L5 and Sacrum.    Assessment post-treatment:  Cervical: .  Thoracic: ROM increased.  Lumbar: ROM increased.    Comments: .      Complicating Factors: .    Procedure(s):  CMT:  82177 Chiropractic manipulative treatment 1-2 regions performed   Thoracic: Diversified, See above for level, Prone and Lumbar: Diversified, See above for level, Side posture    Modalities:  None performed this visit    Therapeutic procedures:  None    Plan:  Treatment plan: PRN.  Instructed patient: stretch as instructed at visit.  Short term goals: increase ROM.  Long term goals: increase ADL.  Prognosis: excellent.

## 2020-02-19 ENCOUNTER — OFFICE VISIT (OUTPATIENT)
Dept: CHIROPRACTIC MEDICINE | Facility: OTHER | Age: 30
End: 2020-02-19
Attending: CHIROPRACTOR
Payer: COMMERCIAL

## 2020-02-19 DIAGNOSIS — M99.02 SEGMENTAL AND SOMATIC DYSFUNCTION OF THORACIC REGION: ICD-10-CM

## 2020-02-19 DIAGNOSIS — M99.03 SEGMENTAL AND SOMATIC DYSFUNCTION OF LUMBAR REGION: Primary | ICD-10-CM

## 2020-02-19 DIAGNOSIS — M54.50 ACUTE BILATERAL LOW BACK PAIN WITHOUT SCIATICA: ICD-10-CM

## 2020-02-19 PROCEDURE — 98941 CHIROPRACT MANJ 3-4 REGIONS: CPT | Mod: AT | Performed by: CHIROPRACTOR

## 2020-03-10 ENCOUNTER — OFFICE VISIT (OUTPATIENT)
Dept: FAMILY MEDICINE | Facility: OTHER | Age: 30
End: 2020-03-10
Attending: FAMILY MEDICINE
Payer: COMMERCIAL

## 2020-03-10 VITALS
BODY MASS INDEX: 42.38 KG/M2 | SYSTOLIC BLOOD PRESSURE: 104 MMHG | HEART RATE: 116 BPM | WEIGHT: 270 LBS | OXYGEN SATURATION: 99 % | HEIGHT: 67 IN | TEMPERATURE: 99.5 F | DIASTOLIC BLOOD PRESSURE: 78 MMHG

## 2020-03-10 DIAGNOSIS — J10.1 INFLUENZA A: ICD-10-CM

## 2020-03-10 DIAGNOSIS — R05.9 COUGH: Primary | ICD-10-CM

## 2020-03-10 DIAGNOSIS — R53.83 FATIGUE, UNSPECIFIED TYPE: ICD-10-CM

## 2020-03-10 DIAGNOSIS — J02.9 SORE THROAT: ICD-10-CM

## 2020-03-10 LAB
FLUAV+FLUBV RNA SPEC QL NAA+PROBE: NEGATIVE
FLUAV+FLUBV RNA SPEC QL NAA+PROBE: POSITIVE
RSV RNA SPEC NAA+PROBE: NEGATIVE
SPECIMEN SOURCE: ABNORMAL
SPECIMEN SOURCE: NORMAL
STREP GROUP A PCR: NOT DETECTED

## 2020-03-10 PROCEDURE — 99213 OFFICE O/P EST LOW 20 MIN: CPT | Performed by: NURSE PRACTITIONER

## 2020-03-10 PROCEDURE — 87631 RESP VIRUS 3-5 TARGETS: CPT | Performed by: NURSE PRACTITIONER

## 2020-03-10 PROCEDURE — 87651 STREP A DNA AMP PROBE: CPT | Performed by: NURSE PRACTITIONER

## 2020-03-10 RX ORDER — OSELTAMIVIR PHOSPHATE 75 MG/1
75 CAPSULE ORAL 2 TIMES DAILY
Qty: 10 CAPSULE | Refills: 0 | Status: SHIPPED | OUTPATIENT
Start: 2020-03-10 | End: 2020-05-16

## 2020-03-10 ASSESSMENT — PAIN SCALES - GENERAL: PAINLEVEL: MILD PAIN (3)

## 2020-03-10 ASSESSMENT — MIFFLIN-ST. JEOR: SCORE: 1977.34

## 2020-03-10 NOTE — LETTER
March 10, 2020      Destini Quintanilla  [address] [facility] [address]        To Whom It May Concern:    Destini ISABEL Dwight was seen on 3/10/2020.  Please excuse her from work until afebrile.         Sincerely,        Mona Reid, NP

## 2020-03-10 NOTE — PROGRESS NOTES
"Subjective     Destini Quintanilla is a 29 year old female who presents to clinic today for the following health issues:    HPI   RESPIRATORY SYMPTOMS      Duration:  2 days     Description  sore throat, some low grade fevers, dry cough, chills, headache, fatigue/malaise, nausea and diarrhea over the weekend, no abdominal pain, no rashes, no shortness of breath or wheezes    Severity: moderate    Accompanying signs and symptoms: sore throat, cough , fatigue     History (predisposing factors):  strep exposure and mono exposure, she also works at the clinic     Precipitating or alleviating factors: None    Therapies tried and outcome:  rest and fluids    Still eating and drinking well.     She does not smoke.     No known asthma.       Patient Active Problem List   Diagnosis     Uses birth control     Morbid obesity (H)     No past surgical history on file.    Social History     Tobacco Use     Smoking status: Never Smoker     Smokeless tobacco: Never Used   Substance Use Topics     Alcohol use: Yes     Alcohol/week: 0.8 standard drinks     Types: 1 Standard drinks or equivalent per week     Comment: occasional     Family History   Problem Relation Age of Onset     Cancer Maternal Grandmother      Cancer Maternal Grandfather         liver     Heart Disease Paternal Grandmother      Heart Disease Paternal Grandfather          Current Outpatient Medications   Medication Sig Dispense Refill     oseltamivir (TAMIFLU) 75 MG capsule Take 1 capsule (75 mg) by mouth 2 times daily for 5 days 10 capsule 0     Allergies   Allergen Reactions     No Known Drug Allergy        Reviewed and updated as needed this visit by Provider         Review of Systems   As noted in the HPI.       Objective    /78 (BP Location: Left arm, Patient Position: Chair, Cuff Size: Adult Large)   Pulse 116   Temp 99.5  F (37.5  C) (Tympanic)   Ht 1.702 m (5' 7\")   Wt 122.5 kg (270 lb)   SpO2 99%   BMI 42.29 kg/m    Body mass index is 42.29 " kg/m .  Physical Exam   GENERAL: fatigued, alert and no distress  EYES: Eyes grossly normal to inspection, PERRL and conjunctivae and sclerae normal  HENT: ear canals and TM's normal, nose and mouth without ulcers or lesions  NECK: no adenopathy  RESP: lungs clear to auscultation - no rales, rhonchi or wheezes  CV: regular rate and rhythm, no murmur  ABDOMEN: soft, nontender, no hepatosplenomegaly, no masses and bowel sounds normal  NEURO: Normal strength and tone, mentation intact and speech normal  PSYCH: mentation appears normal, affect normal/bright    Diagnostic Test Results:  Labs reviewed in Epic  Results for orders placed or performed in visit on 03/10/20 (from the past 24 hour(s))   Group A Streptococcus PCR Throat Swab ([place] ONLY)    Specimen: Throat   Result Value Ref Range    Specimen Description Throat     Strep Group A PCR Not Detected NDET^Not Detected   Influenza A and B and RSV PCR   Result Value Ref Range    Specimen Description Nasopharyngeal     Influenza A PCR Positive (A) NEG^Negative    Influenza B PCR Negative NEG^Negative    Resp Syncytial Virus Negative NEG^Negative           Assessment & Plan   (R05) Cough  (primary encounter diagnosis)  (R53.83) Fatigue, unspecified type  (J10.1) Influenza A  (J02.9) Sore throat  Plan: Positive for influenza A. We are in the treatment window of 48 hours. She would like the Tamiflu. This was ordered. She was made aware of the side effects. Symptomatic cares also encouraged. The lack of efficacy of antibiotics was discussed. The patient will follow up with new or worsening symptoms.       Mona Reid NP  [facility] - [place]

## 2020-03-10 NOTE — NURSING NOTE
"Chief Complaint   Patient presents with     Pharyngitis     x 2 days      Cough     Fatigue     Headache       Initial /78 (BP Location: Left arm, Patient Position: Chair, Cuff Size: Adult Large)   Pulse 116   Temp 99.5  F (37.5  C) (Tympanic)   Ht 1.702 m (5' 7\")   Wt 122.5 kg (270 lb)   SpO2 99%   BMI 42.29 kg/m   Estimated body mass index is 42.29 kg/m  as calculated from the following:    Height as of this encounter: 1.702 m (5' 7\").    Weight as of this encounter: 122.5 kg (270 lb).  Medication Reconciliation: complete  Josefina Rothman LPN  "

## 2020-03-18 ENCOUNTER — ALLIED HEALTH/NURSE VISIT (OUTPATIENT)
Dept: FAMILY MEDICINE | Facility: OTHER | Age: 30
End: 2020-03-18
Attending: NURSE PRACTITIONER
Payer: COMMERCIAL

## 2020-03-18 DIAGNOSIS — Z78.9 USES BIRTH CONTROL: Primary | ICD-10-CM

## 2020-03-18 PROCEDURE — 96372 THER/PROPH/DIAG INJ SC/IM: CPT

## 2020-03-18 NOTE — PROGRESS NOTES
Clinic Administered Medication Documentation      Depo Provera Documentation    URINE HCG: negative    Depo-Provera Standing Order inclusion/exclusion criteria reviewed.   Patient meets: inclusion criteria     BP: Data Unavailable  LAST PAP/EXAM:   Lab Results   Component Value Date    PAP NIL 11/17/2017       Prior to injection, verified patient identity using patient's name and date of birth. Medication was administered. Please see MAR and medication order for additional information.     Was entire vial of medication used? Yes  Vial/Syringe: Single dose vial  Expiration Date:  11/2021    Patient instructed to remain in clinic for 15 minutes.  NEXT INJECTION DUE: 6/3/20 - 6/17/20

## 2020-05-16 ENCOUNTER — HOSPITAL ENCOUNTER (EMERGENCY)
Facility: HOSPITAL | Age: 30
Discharge: HOME | End: 2020-05-16
Attending: NURSE PRACTITIONER | Admitting: NURSE PRACTITIONER
Payer: COMMERCIAL

## 2020-05-16 ENCOUNTER — APPOINTMENT (OUTPATIENT)
Dept: GENERAL RADIOLOGY | Facility: HOSPITAL | Age: 30
End: 2020-05-16
Attending: NURSE PRACTITIONER
Payer: COMMERCIAL

## 2020-05-16 VITALS
SYSTOLIC BLOOD PRESSURE: 139 MMHG | HEART RATE: 115 BPM | OXYGEN SATURATION: 100 % | RESPIRATION RATE: 18 BRPM | DIASTOLIC BLOOD PRESSURE: 96 MMHG | TEMPERATURE: 99.2 F

## 2020-05-16 DIAGNOSIS — S93.401A RIGHT ANKLE SPRAIN: ICD-10-CM

## 2020-05-16 PROCEDURE — G0463 HOSPITAL OUTPT CLINIC VISIT: HCPCS

## 2020-05-16 PROCEDURE — 73610 X-RAY EXAM OF ANKLE: CPT | Mod: TC,RT

## 2020-05-16 PROCEDURE — 99213 OFFICE O/P EST LOW 20 MIN: CPT | Mod: Z6 | Performed by: NURSE PRACTITIONER

## 2020-05-16 NOTE — ED AVS SNAPSHOT
[facility]  [address] [address]  Phone:  [phone]                                    Destini Quintanilla   MRN: [id number]    Department:  [facility]   Date of Visit:  5/16/2020           After Visit Summary Signature Page    I have received my discharge instructions, and my questions have been answered. I have discussed any challenges I see with this plan with the nurse or doctor.    ..........................................................................................................................................  Patient/Patient Representative Signature      ..........................................................................................................................................  Patient Representative Print Name and Relationship to Patient    ..................................................               ................................................  Date                                   Time    ..........................................................................................................................................  Reviewed by Signature/Title    ...................................................              ..............................................  Date                                               Time          22EPIC Rev 08/18

## 2020-05-17 ASSESSMENT — ENCOUNTER SYMPTOMS
CHILLS: 0
ACTIVITY CHANGE: 1
FEVER: 0
VOMITING: 0
NEUROLOGICAL NEGATIVE: 1
NAUSEA: 0

## 2020-05-17 NOTE — ED TRIAGE NOTES
Pt presents today with c/o right ankle pain. Pt tripped over a friends dong. Fell and twisted her ankle.

## 2020-05-17 NOTE — ED PROVIDER NOTES
History     Chief Complaint   Patient presents with     Ankle Pain     HPI  Destini Quintanilla is a 29 year old female who presents with right ankle pain after being knocked over by a dog. The dog knocked her aver and her caused to ankle to twist when she fell. She could not walk on her ankle immediately afterwards.  Denies previous injury. Has not tried any home interventions or OTC products. Denies numbness, tingling, fevers, and chills.    Musculoskeletal problem/pain      Duration: dog knocked her over one hour ago. The dog hit her in the knee and lower leg knocking her over. When she felt she twisted her right ankle    Description  Location: right ankle pain    Intensity:  3/10    Accompanying signs and symptoms: none knee is slightly sore    History  Previous similar problem: no   Previous evaluation:  none    Precipitating or alleviating factors:  Trauma or overuse: YES- dog knocked her over from the right side.  Aggravating factors include: walking    Therapies tried and outcome: nothing         Allergies:  Allergies   Allergen Reactions     No Known Drug Allergy        Problem List:    Patient Active Problem List    Diagnosis Date Noted     Morbid obesity (H) 05/24/2019     Priority: Medium     Uses birth control 05/31/2013     Priority: Medium        Past Medical History:    History reviewed. No pertinent past medical history.    Past Surgical History:    History reviewed. No pertinent surgical history.    Family History:    Family History   Problem Relation Age of Onset     Cancer Maternal Grandmother      Cancer Maternal Grandfather         liver     Heart Disease Paternal Grandmother      Heart Disease Paternal Grandfather        Social History:  Marital Status:  Single [1]  Social History     Tobacco Use     Smoking status: Never Smoker     Smokeless tobacco: Never Used   Substance Use Topics     Alcohol use: Yes     Alcohol/week: 0.8 standard drinks     Types: 1 Standard drinks or equivalent per week      Comment: occasional     Drug use: No        Medications:    No current outpatient medications on file.        Review of Systems   Constitutional: Positive for activity change. Negative for chills and fever.   Gastrointestinal: Negative for nausea and vomiting.   Musculoskeletal:        Right ankle pain   Skin: Negative.    Neurological: Negative.        Physical Exam   BP: 139/96  Pulse: 115  Temp: 99.2  F (37.3  C)  Resp: 18  SpO2: 100 %      Physical Exam  Vitals signs and nursing note reviewed.   Constitutional:       General: She is in acute distress.      Appearance: She is obese.   Cardiovascular:      Rate and Rhythm: Tachycardia present.      Pulses:           Dorsalis pedis pulses are 3+ on the right side.        Posterior tibial pulses are 3+ on the right side.   Pulmonary:      Effort: Pulmonary effort is normal.   Musculoskeletal:         General: Swelling and tenderness present.      Right knee: Normal.      Right ankle: She exhibits decreased range of motion and swelling. Tenderness. Posterior TFL tenderness found. No lateral malleolus, no medial malleolus, no head of 5th metatarsal and no proximal fibula tenderness found. Achilles tendon exhibits pain. Achilles tendon exhibits no defect and normal Deng's test results.        Feet:    Skin:     General: Skin is warm and dry.      Capillary Refill: Capillary refill takes less than 2 seconds.      Findings: No bruising or erythema.   Neurological:      Mental Status: She is alert and oriented to person, place, and time.   Psychiatric:         Behavior: Behavior normal.         ED Course        Procedures           Results for orders placed or performed during the hospital encounter of 05/16/20 (from the past 24 hour(s))   Ankle XR, G/E 3 views, right    Narrative    PROCEDURE:  XR ANKLE RT G/E 3 VW    HISTORY: fall    COMPARISON:  None.    TECHNIQUE:  3 views of the right ankle were obtained.    FINDINGS:  No fracture or dislocation is  identified. The joint spaces  are preserved.        Impression    IMPRESSION: No acute fracture.      HUNTER BARRIOS MD       Medications - No data to display    Assessments & Plan (with Medical Decision Making)     I have reviewed the nursing notes.    I have reviewed the findings, diagnosis, plan and need for follow up with the patient.  (I74.491G) Right ankle sprain  Comment: 29 year old female who presents with right ankle pain after being knocked over by a dog. The dog knocked her aver and her caused to ankle to twist when she fell. She could not walk on her ankle immediately afterwards.  Denies previous injury. Has not tried any home interventions or OTC products. Denies numbness, tingling, fevers, and chills.    Assessment: Pain with palpation over posterior talofibular ligament with palpation. Swelling noted but denies tenderness with palpation over lateral malleolus. Able to bear weight at this time.  Pedal pulses 3+.    Plan: Ace wrap and gel splint applied to right ankle. education provided for ankle sprain.   Keep affected extremity elevated as much as possible for next 24 - 48 hours. Ice to affected area 20 minutes every hour as needed for comfort. After 48 hours you can apply heat. Ibuprofen 600 to 800 mg (3 - 4 tabs of over the counter med) every six to eight hours as needed;not to exceed maximum amount of 3200 mg in 24 hours.Tylenol 650 to 1000 mg every four to six hours as needed (not to exceed more than 4000 mg in a 24 hour period). May use interchangeably. Suggest medicating around the clock for the next 24-48 hours. Use ace wrap and ankle splint  until you can ambulate without pain. Slowly start to wiggle your toes and move foot and ankle as often as possible but not beyond the point of pain. Follow up with primary provider as needed  These discharge instructions and medications were reviewed with her and understanding verbalized.    There are no discharge medications for this  patient.      Final diagnoses:   Right ankle sprain       5/16/2020   [facility]       Fara Myrick, CNP  05/17/20 0936

## 2020-05-17 NOTE — DISCHARGE INSTRUCTIONS
Keep affected extremity elevated as much as possible for next 24 - 48 hours. Ice to affected area 20 minutes every hour as needed for comfort. After 48 hours you can apply heat. Ibuprofen 600 to 800 mg (3 - 4 tabs of over the counter med) every six to eight hours as needed;not to exceed maximum amount of 3200 mg in 24 hours.Tylenol 650 to 1000 mg every four to six hours as needed (not to exceed more than 4000 mg in a 24 hour period). May use interchangeably. Suggest medicating around the clock for the next 24-48 hours. Use ace wrap and ankle splint  until you can ambulate without pain. Slowly start to wiggle your toes and move foot and ankle as often as possible but not beyond the point of pain. Follow up with primary provider as needed

## 2020-05-18 ENCOUNTER — TELEPHONE (OUTPATIENT)
Dept: FAMILY MEDICINE | Facility: OTHER | Age: 30
End: 2020-05-18

## 2020-05-18 NOTE — TELEPHONE ENCOUNTER
Message left for patient to return call if there are any changes/questions/or new or worsening symptoms to nurse triage line.

## 2020-06-03 ENCOUNTER — ALLIED HEALTH/NURSE VISIT (OUTPATIENT)
Dept: ALLERGY | Facility: OTHER | Age: 30
End: 2020-06-03
Attending: NURSE PRACTITIONER
Payer: COMMERCIAL

## 2020-06-03 DIAGNOSIS — Z30.42 ENCOUNTER FOR SURVEILLANCE OF INJECTABLE CONTRACEPTIVE: Primary | ICD-10-CM

## 2020-06-03 PROCEDURE — 96372 THER/PROPH/DIAG INJ SC/IM: CPT

## 2020-06-03 RX ADMIN — MEDROXYPROGESTERONE ACETATE 150 MG: 150 INJECTION, SUSPENSION INTRAMUSCULAR at 09:06

## 2020-06-03 NOTE — PROGRESS NOTES
Clinic Administered Medication Documentation      Depo Provera Documentation    URINE HCG: not indicated    Depo-Provera Standing Order inclusion/exclusion criteria reviewed.   Patient meets: inclusion criteria     BP: Data Unavailable  LAST PAP/EXAM:   Lab Results   Component Value Date    PAP NIL 11/17/2017       Prior to injection, verified patient identity using patient's name and date of birth. Medication was administered. Please see MAR and medication order for additional information.     Was entire vial of medication used? Yes  Vial/Syringe: Single dose vial  Expiration Date:  11/2021    Patient instructed to remain in clinic for 15 minutes.  NEXT INJECTION DUE: 8/19/20 - 9/2/20    Given in right glut      KATHERINE TRACY LPN

## 2020-08-20 DIAGNOSIS — Z30.42 ENCOUNTER FOR SURVEILLANCE OF INJECTABLE CONTRACEPTIVE: Primary | ICD-10-CM

## 2020-08-20 RX ORDER — MEDROXYPROGESTERONE ACETATE 150 MG/ML
150 INJECTION, SUSPENSION INTRAMUSCULAR
Status: DISCONTINUED | OUTPATIENT
Start: 2020-08-21 | End: 2020-10-07

## 2020-08-20 NOTE — PROGRESS NOTES
Patient is on the schedule for a Depo-provera injection tomorrow, but order has .  Patient's PCP is Mona Reid.  Medication is pended if you approve.  Last office visit was 3/10/20.  Thank you.    Saundra Denny RN

## 2020-08-21 ENCOUNTER — ALLIED HEALTH/NURSE VISIT (OUTPATIENT)
Dept: ALLERGY | Facility: OTHER | Age: 30
End: 2020-08-21
Attending: NURSE PRACTITIONER
Payer: COMMERCIAL

## 2020-08-21 DIAGNOSIS — Z30.42 ENCOUNTER FOR SURVEILLANCE OF INJECTABLE CONTRACEPTIVE: Primary | ICD-10-CM

## 2020-08-21 PROCEDURE — 96372 THER/PROPH/DIAG INJ SC/IM: CPT

## 2020-08-21 RX ADMIN — MEDROXYPROGESTERONE ACETATE 150 MG: 150 INJECTION, SUSPENSION INTRAMUSCULAR at 08:26

## 2020-08-21 NOTE — PROGRESS NOTES
Clinic Administered Medication Documentation      Depo Provera Documentation    URINE HCG: not indicated    Depo-Provera Standing Order inclusion/exclusion criteria reviewed.   Patient meets: inclusion criteria     BP: Data Unavailable  LAST PAP/EXAM:   Lab Results   Component Value Date    PAP NIL 11/17/2017       Prior to injection, verified patient identity using patient's name and date of birth. Medication was administered. Please see MAR and medication order for additional information.     Was entire vial of medication used? Yes  Vial/Syringe: Single dose vial  Expiration Date:  11/2021    Patient instructed to stay in clinic after the injection but patient declined.  NEXT INJECTION DUE: 11/6/20 - 11/20/20  HINA ROBERSON LPN

## 2020-10-01 ENCOUNTER — RESULTS ONLY (OUTPATIENT)
Dept: LAB | Age: 30
End: 2020-10-01

## 2020-10-02 LAB
SARS-COV-2 RNA SPEC QL NAA+PROBE: NOT DETECTED
SPECIMEN SOURCE: NORMAL

## 2020-10-05 NOTE — PROGRESS NOTES
SUBJECTIVE:   CC: Destini Quintanilla is an 30 year old woman who presents for preventive health visit.     Patient has been advised of split billing requirements and indicates understanding: Yes     Healthy Habits:    Do you get at least three servings of calcium containing foods daily (dairy, green leafy vegetables, etc.)? YES     Amount of exercise or daily activities, outside of work: 0 day(s) per week    Problems taking medications regularly No    Medication side effects: No    Have you had an eye exam in the past two years? No, has an appointment next Friday     Do you see a dentist twice per year? yes    Do you have sleep apnea, excessive snoring or daytime drowsiness? Snores, declines sleep study      Today's PHQ-2 Score:   PHQ-2 ( 1999 [employer]) 10/7/2020 3/10/2020   Q1: Little interest or pleasure in doing things 0 0   Q2: Feeling down, depressed or hopeless 0 0   PHQ-2 Score 0 0       Abuse: Current or Past(Physical, Sexual or Emotional)- No  Do you feel safe in your environment? Yes    Social History     Tobacco Use     Smoking status: Never Smoker     Smokeless tobacco: Never Used   Substance Use Topics     Alcohol use: Yes     Alcohol/week: 0.8 standard drinks     Types: 1 Standard drinks or equivalent per week     Comment: occasional     If you drink alcohol do you typically have >3 drinks per day or >7 drinks per week? No                     Reviewed orders with patient.  Reviewed health maintenance and updated orders accordingly - Yes  BP Readings from Last 3 Encounters:   10/07/20 130/88   05/16/20 139/96   03/10/20 104/78    Wt Readings from Last 3 Encounters:   10/07/20 135.2 kg (298 lb)   03/10/20 122.5 kg (270 lb)   11/26/19 127 kg (280 lb)                  Patient Active Problem List   Diagnosis     Uses birth control     Morbid obesity (H)     No past surgical history on file.    Social History     Tobacco Use     Smoking status: Never Smoker     Smokeless tobacco: Never Used   Substance Use  "Topics     Alcohol use: Yes     Alcohol/week: 0.8 standard drinks     Types: 1 Standard drinks or equivalent per week     Comment: occasional     Family History   Problem Relation Age of Onset     Cancer Maternal Grandmother      Cancer Maternal Grandfather         liver     Heart Disease Paternal Grandmother      Heart Disease Paternal Grandfather          No current outpatient medications on file.       Mammogram not appropriate for this patient based on age. No family history of breast cancer.     History of abnormal Pap smear: NO - age 30- 65 PAP every 3 years recommended    PAP / HPV 11/17/2017 9/5/2014   PAP NIL NIL     Denies abnormal vaginal discharge or odor. No concern for STDs.     Reviewed and updated as needed this visit by clinical staff  Tobacco  Allergies   Problems  Med Hx            Reviewed and updated as needed this visit by Provider  Tobacco    Problems  Med Hx           History reviewed. No pertinent past medical history.   No past surgical history on file.    ROS:  CONSTITUTIONAL: NEGATIVE for fever, chills, change in weight  INTEGUMENTARU/SKIN: NEGATIVE for worrisome rashes, moles or lesions  EYES: NEGATIVE for vision changes or irritation  ENT: NEGATIVE for ear, mouth and throat problems  RESP: NEGATIVE for significant cough or SOB  BREAST: NEGATIVE for masses, tenderness or discharge  CV: NEGATIVE for chest pain, palpitations or peripheral edema  GI: NEGATIVE for nausea, abdominal pain, heartburn, or change in bowel habits  : NEGATIVE for unusual urinary or vaginal symptoms. Does not get a period-on the depo  MUSCULOSKELETAL: NEGATIVE for significant arthralgias or myalgia  NEURO: NEGATIVE for weakness, dizziness or paresthesias  PSYCHIATRIC: NEGATIVE for changes in mood or affect    OBJECTIVE:   /88 (BP Location: Right arm, Patient Position: Chair, Cuff Size: Adult Large)   Pulse 95   Temp 97.9  F (36.6  C) (Tympanic)   Ht 1.702 m (5' 7\")   Wt 135.2 kg (298 lb)   SpO2 " 100%   BMI 46.67 kg/m    EXAM:  GENERAL: alert, no distress and obese  EYES: Eyes grossly normal to inspection, PERRL and conjunctivae and sclerae normal  HENT: ear canals and TM's normal, nose and mouth without ulcers or lesions  NECK: no adenopathy, no asymmetry, masses, or scars and thyroid normal to palpation  RESP: lungs clear to auscultation - no rales, rhonchi or wheezes  BREAST: normal without masses, tenderness or nipple discharge and no palpable axillary masses or adenopathy  CV: regular rate and rhythm, normal S1 S2, no S3 or S4, no murmur, click or rub, no peripheral edema and peripheral pulses strong  ABDOMEN: soft, nontender, no hepatosplenomegaly, no masses and bowel sounds normal   (female): normal female external genitalia, normal urethral meatus, vaginal mucosa pink, moist, well rugated, and normal cervix/adnexa/uterus without masses or discharge  MS: no gross musculoskeletal defects noted, no edema  SKIN: no suspicious lesions or rashes  NEURO: Normal strength and tone, mentation intact and speech normal  PSYCH: mentation appears normal, affect normal/bright    Diagnostic Test Results:  pending    ASSESSMENT/PLAN:   (Z00.00) Routine general medical examination at a health care facility  (primary encounter diagnosis)  Plan: Vaccines UTD. Mammogram at 40. Colonoscopy at 50. Pap and fasting labs done today. Will notify patient of the results when available and intervene accordingly.     (Z12.4) Screening for cervical cancer  Plan: HPV High Risk Types DNA Cervical, A pap thin layer screen with  HPV - recommended age 30 - 65 years (select HPV order below)        Will notify patient of the results when available and intervene accordingly.     (Z13.220) Lipid screening  Plan: Lipid Profile        Will notify patient of the results when available and intervene accordingly.     (Z13.1) Screening for diabetes mellitus  Plan: Basic metabolic panel        Will notify patient of the results when available  "and intervene accordingly.     (Z20.828) Encounter for screening laboratory testing for COVID-19 virus  Plan: COVID-19 Virus (Coronavirus) Antibody Screen,         IgG, CANCELED: COVID-19 Virus (Coronavirus)         Antibody & Titer Reflex        Will notify patient of the results when available and intervene accordingly.     (R06.83) Sncarol  Comment: BMI 46.67  Plan: Encouraged sleep study, patient declined.     (E66.01) Morbid obesity (H)  Comment: BMI 46.67  Plan: Diet and exercise encouraged.         Patient has been advised of split billing requirements and indicates understanding: Yes  COUNSELING:   Reviewed preventive health counseling, as reflected in patient instructions       Regular exercise       Healthy diet/nutrition       Vision screening       Hearing screening       Family planning       Folic Acid Counseling    Estimated body mass index is 46.67 kg/m  as calculated from the following:    Height as of this encounter: 1.702 m (5' 7\").    Weight as of this encounter: 135.2 kg (298 lb).    Weight management plan: Discussed healthy diet and exercise guidelines    She reports that she has never smoked. She has never used smokeless tobacco.      Counseling Resources:  ATP IV Guidelines  Pooled Cohorts Equation Calculator  Breast Cancer Risk Calculator  BRCA-Related Cancer Risk Assessment: FHS-7 Tool  FRAX Risk Assessment  ICSI Preventive Guidelines  Dietary Guidelines for Americans, 2010  USDA's MyPlate  ASA Prophylaxis  Lung CA Screening    Mona Reid NP  [facility] [place]  "

## 2020-10-07 ENCOUNTER — OFFICE VISIT (OUTPATIENT)
Dept: FAMILY MEDICINE | Facility: OTHER | Age: 30
End: 2020-10-07
Attending: NURSE PRACTITIONER
Payer: COMMERCIAL

## 2020-10-07 VITALS
TEMPERATURE: 97.9 F | WEIGHT: 293 LBS | BODY MASS INDEX: 45.99 KG/M2 | DIASTOLIC BLOOD PRESSURE: 88 MMHG | HEIGHT: 67 IN | SYSTOLIC BLOOD PRESSURE: 130 MMHG | OXYGEN SATURATION: 100 % | HEART RATE: 95 BPM

## 2020-10-07 DIAGNOSIS — Z13.1 SCREENING FOR DIABETES MELLITUS: ICD-10-CM

## 2020-10-07 DIAGNOSIS — Z12.4 SCREENING FOR CERVICAL CANCER: ICD-10-CM

## 2020-10-07 DIAGNOSIS — Z00.00 ROUTINE GENERAL MEDICAL EXAMINATION AT A HEALTH CARE FACILITY: Primary | ICD-10-CM

## 2020-10-07 DIAGNOSIS — R06.83 SNORES: ICD-10-CM

## 2020-10-07 DIAGNOSIS — Z13.220 LIPID SCREENING: ICD-10-CM

## 2020-10-07 DIAGNOSIS — E66.01 MORBID OBESITY (H): ICD-10-CM

## 2020-10-07 DIAGNOSIS — Z11.52 ENCOUNTER FOR SCREENING LABORATORY TESTING FOR COVID-19 VIRUS: ICD-10-CM

## 2020-10-07 LAB
ANION GAP SERPL CALCULATED.3IONS-SCNC: 3 MMOL/L (ref 3–14)
BUN SERPL-MCNC: 12 MG/DL (ref 7–30)
CALCIUM SERPL-MCNC: 9.2 MG/DL (ref 8.5–10.1)
CHLORIDE SERPL-SCNC: 109 MMOL/L (ref 94–109)
CHOLEST SERPL-MCNC: 181 MG/DL
CO2 SERPL-SCNC: 28 MMOL/L (ref 20–32)
CREAT SERPL-MCNC: 0.72 MG/DL (ref 0.52–1.04)
GFR SERPL CREATININE-BSD FRML MDRD: >90 ML/MIN/{1.73_M2}
GLUCOSE SERPL-MCNC: 94 MG/DL (ref 70–99)
HDLC SERPL-MCNC: 53 MG/DL
LDLC SERPL CALC-MCNC: 113 MG/DL
NONHDLC SERPL-MCNC: 128 MG/DL
POTASSIUM SERPL-SCNC: 4.3 MMOL/L (ref 3.4–5.3)
SODIUM SERPL-SCNC: 140 MMOL/L (ref 133–144)
TRIGL SERPL-MCNC: 73 MG/DL

## 2020-10-07 PROCEDURE — 86769 SARS-COV-2 COVID-19 ANTIBODY: CPT | Performed by: NURSE PRACTITIONER

## 2020-10-07 PROCEDURE — G0123 SCREEN CERV/VAG THIN LAYER: HCPCS | Performed by: NURSE PRACTITIONER

## 2020-10-07 PROCEDURE — 80061 LIPID PANEL: CPT | Performed by: NURSE PRACTITIONER

## 2020-10-07 PROCEDURE — 80048 BASIC METABOLIC PNL TOTAL CA: CPT | Performed by: NURSE PRACTITIONER

## 2020-10-07 PROCEDURE — 99395 PREV VISIT EST AGE 18-39: CPT | Performed by: NURSE PRACTITIONER

## 2020-10-07 PROCEDURE — 36415 COLL VENOUS BLD VENIPUNCTURE: CPT | Performed by: NURSE PRACTITIONER

## 2020-10-07 PROCEDURE — 87624 HPV HI-RISK TYP POOLED RSLT: CPT | Performed by: NURSE PRACTITIONER

## 2020-10-07 ASSESSMENT — PAIN SCALES - GENERAL: PAINLEVEL: NO PAIN (0)

## 2020-10-07 ASSESSMENT — MIFFLIN-ST. JEOR: SCORE: 2099.35

## 2020-10-07 NOTE — NURSING NOTE
"Chief Complaint   Patient presents with     Physical     complete physical        Initial /88 (BP Location: Right arm, Patient Position: Chair, Cuff Size: Adult Large)   Pulse 95   Temp 97.9  F (36.6  C) (Tympanic)   Ht 1.702 m (5' 7\")   Wt 135.2 kg (298 lb)   SpO2 100%   BMI 46.67 kg/m   Estimated body mass index is 46.67 kg/m  as calculated from the following:    Height as of this encounter: 1.702 m (5' 7\").    Weight as of this encounter: 135.2 kg (298 lb).  Medication Reconciliation: complete  Josefina Rothman LPN  "

## 2020-10-08 LAB
COPATH REPORT: NORMAL
COVID-19 ANTIBODY IGG: NEGATIVE
LAB TEST METHOD: NORMAL
PAP: NORMAL

## 2020-10-12 PROBLEM — R87.619 ABNORMAL PAP SMEAR OF CERVIX: Status: ACTIVE | Noted: 2020-10-12

## 2020-10-12 LAB
FINAL DIAGNOSIS: ABNORMAL
HPV HR 12 DNA CVX QL NAA+PROBE: POSITIVE
HPV16 DNA SPEC QL NAA+PROBE: NEGATIVE
HPV18 DNA SPEC QL NAA+PROBE: NEGATIVE
SPECIMEN DESCRIPTION: ABNORMAL
SPECIMEN SOURCE CVX/VAG CYTO: ABNORMAL

## 2021-02-10 ENCOUNTER — TELEPHONE (OUTPATIENT)
Dept: FAMILY MEDICINE | Facility: OTHER | Age: 31
End: 2021-02-10

## 2021-02-10 DIAGNOSIS — N92.6 MISSED MENSES: ICD-10-CM

## 2021-02-10 DIAGNOSIS — N92.6 MISSED MENSES: Primary | ICD-10-CM

## 2021-02-10 LAB — B-HCG SERPL-ACNC: <1 IU/L (ref 0–5)

## 2021-02-10 PROCEDURE — 36415 COLL VENOUS BLD VENIPUNCTURE: CPT | Performed by: NURSE PRACTITIONER

## 2021-02-10 PROCEDURE — 84702 CHORIONIC GONADOTROPIN TEST: CPT | Performed by: NURSE PRACTITIONER

## 2021-05-19 ENCOUNTER — TELEPHONE (OUTPATIENT)
Dept: FAMILY MEDICINE | Facility: OTHER | Age: 31
End: 2021-05-19

## 2021-10-03 ENCOUNTER — HEALTH MAINTENANCE LETTER (OUTPATIENT)
Age: 31
End: 2021-10-03

## 2021-11-03 NOTE — PROGRESS NOTES
SUBJECTIVE:   CC: Destini Tee is an 31 year old woman who presents for preventive health visit.       Patient has been advised of split billing requirements and indicates understanding: Yes  Healthy Habits:     Getting at least 3 servings of Calcium per day:  NO    Bi-annual eye exam:  Yes    Dental care twice a year:  Yes    Sleep apnea or symptoms of sleep apnea:  Daytime drowsiness    Diet:  Regular (no restrictions)    Frequency of exercise:  None    Taking medications regularly:  Not Applicable    Medication side effects:  Not applicable    PHQ-2 Total Score: 0    Additional concerns today:  No    Has some daytime drowsiness and fatigue.  says she snores. Sleeps well. Feels well rested in the morning. Declines a sleep study at this time.     Due for Hep C screening. Willing to get.     Fasting labs done last year and appeared ok. Will repeat again as BMI elevated.       Today's PHQ-2 Score:   PHQ-2 ( 1999 [employer]) 11/5/2021   Q1: Little interest or pleasure in doing things 0   Q2: Feeling down, depressed or hopeless 0   PHQ-2 Score 0   Q1: Little interest or pleasure in doing things -   Q2: Feeling down, depressed or hopeless -   PHQ-2 Score -       Abuse: Current or Past (Physical, Sexual or Emotional) - No  Do you feel safe in your environment? No        Social History     Tobacco Use     Smoking status: Never Smoker     Smokeless tobacco: Never Used   Substance Use Topics     Alcohol use: Yes     Alcohol/week: 0.8 standard drinks     Types: 1 Standard drinks or equivalent per week     Comment: occasional       Alcohol Use 11/2/2021   Prescreen: >3 drinks/day or >7 drinks/week? No       Reviewed orders with patient.  Reviewed health maintenance and updated orders accordingly - Yes  BP Readings from Last 3 Encounters:   11/05/21 112/80   10/07/20 130/88   05/16/20 139/96    Wt Readings from Last 3 Encounters:   11/05/21 138.3 kg (305 lb)   10/07/20 135.2 kg (298 lb)   03/10/20 122.5 kg (270  lb)                  Patient Active Problem List   Diagnosis     Uses birth control     Morbid obesity (H)     Abnormal Pap smear of cervix-cytology negative, HPV other positive, repeat cotesting in 10/2021     History reviewed. No pertinent surgical history.    Social History     Tobacco Use     Smoking status: Never Smoker     Smokeless tobacco: Never Used   Substance Use Topics     Alcohol use: Yes     Alcohol/week: 0.8 standard drinks     Types: 1 Standard drinks or equivalent per week     Comment: occasional     Family History   Problem Relation Age of Onset     Cancer Maternal Grandmother      Cancer Maternal Grandfather         liver     Heart Disease Paternal Grandmother      Heart Disease Paternal Grandfather          No current outpatient medications on file.       Breast Cancer Screening:    Breast CA Risk Assessment (FHS-7) 11/2/2021   Do you have a family history of breast, colon, or ovarian cancer? No / Unknown       Patient under 40 years of age: Routine Mammogram Screening not recommended.   Pertinent mammograms are reviewed under the imaging tab.    History of abnormal Pap smear: YES - updated in Problem List and Health Maintenance accordingly  PAP / HPV Latest Ref Rng & Units 10/7/2020 11/17/2017 9/5/2014   PAP (Historical) - NIL NIL NIL   HPV16 NEG:Negative Negative - -   HPV18 NEG:Negative Negative - -   HRHPV NEG:Negative Positive(A) - -     Reviewed and updated as needed this visit by clinical staff  Tobacco  Allergies   Problems  Med Hx  Surg Hx  Fam Hx          Reviewed and updated as needed this visit by Provider   Allergies   Problems  Med Hx  Surg Hx  Fam Hx         History reviewed. No pertinent past medical history.   History reviewed. No pertinent surgical history.    Review of Systems  CONSTITUTIONAL: NEGATIVE for fever, chills, change in weight  INTEGUMENTARU/SKIN: NEGATIVE for worrisome rashes, moles or lesions  EYES: NEGATIVE for vision changes or irritation  ENT:  "NEGATIVE for ear, mouth and throat problems  RESP: NEGATIVE for significant cough or SOB  BREAST: NEGATIVE for masses, tenderness or discharge  CV: NEGATIVE for chest pain, palpitations or peripheral edema  GI: NEGATIVE for nausea, abdominal pain, heartburn, or change in bowel habits  : NEGATIVE for unusual urinary or vaginal symptoms. Periods are regular.  MUSCULOSKELETAL: NEGATIVE for significant arthralgias or myalgia  NEURO: NEGATIVE for weakness, dizziness or paresthesias  PSYCHIATRIC: NEGATIVE for changes in mood or affect     OBJECTIVE:   /80 (BP Location: Right arm, Patient Position: Chair, Cuff Size: Adult Large)   Pulse 89   Ht 1.702 m (5' 7\")   Wt 138.3 kg (305 lb)   LMP 10/18/2021   SpO2 99%   BMI 47.77 kg/m    Physical Exam  GENERAL: alert, no distress and obese  EYES: Eyes grossly normal to inspection, PERRL and conjunctivae and sclerae normal  HENT: ear canals and TM's normal, nose and mouth without ulcers or lesions  NECK: no adenopathy, no asymmetry, masses, or scars and thyroid normal to palpation  RESP: lungs clear to auscultation - no rales, rhonchi or wheezes  BREAST: normal without masses, tenderness or nipple discharge and no palpable axillary masses or adenopathy  CV: regular rate and rhythm, normal S1 S2, no S3 or S4, no murmur, click or rub, no peripheral edema and peripheral pulses strong  ABDOMEN: soft, nontender, no hepatosplenomegaly, no masses and bowel sounds normal   (female): normal female external genitalia, normal urethral meatus, vaginal mucosa pink, moist, well rugated, and normal cervix/adnexa/uterus without masses or discharge  MS: no gross musculoskeletal defects noted, no edema  SKIN: no suspicious lesions or rashes  NEURO: Normal strength and tone, mentation intact and speech normal  PSYCH: mentation appears normal, affect normal/bright    Labs pending    ASSESSMENT/PLAN:   (Z00.00) Routine general medical examination at a health care facility  (primary " "encounter diagnosis)  Plan: Pap done today. Fasting labs done today. Mammogram at 39. Colonoscopy at 44. Vaccines UTD. F/up yearly for CP.     (Z12.4) Screening for cervical cancer  (R87.618) Pap smear abnormality of cervix/human papillomavirus (HPV) positive  Plan: A pap thin layer screen with  HPV - recommended age 30 - 65 years        Will notify patient of the results when available and intervene accordingly.     (Z11.59) Need for hepatitis C screening test  Plan: Hepatitis C Screen Reflex to HCV RNA Quant and Genotype        Will notify patient of the results when available and intervene accordingly.     (Z13.0) Screening, anemia, deficiency, iron  Plan: CBC with platelets and differential        Will notify patient of the results when available and intervene accordingly.     (Z13.1) Screening for diabetes mellitus  Plan: Comprehensive metabolic panel (BMP + Alb, Alk Phos, ALT, AST, Total. Bili, TP)        Will notify patient of the results when available and intervene accordingly.     (Z13.220) Lipid screening  Plan: Lipid Profile (Chol, Trig, HDL, LDL calc)        Will notify patient of the results when available and intervene accordingly.     (R53.83) Fatigue, unspecified type  Plan: TSH with free T4 reflex        Will notify patient of the results when available and intervene accordingly. Also has some daytime drowsiness. Snores. Possible sleep apnea as BMI 47.77. Declines sleep study.       Patient has been advised of split billing requirements and indicates understanding: Yes  COUNSELING:  Reviewed preventive health counseling, as reflected in patient instructions       Regular exercise       Healthy diet/nutrition       Vision screening       Hearing screening       Consider Hep C screening for all patients one time for ages 18-79 years    Estimated body mass index is 47.77 kg/m  as calculated from the following:    Height as of this encounter: 1.702 m (5' 7\").    Weight as of this encounter: 138.3 kg " (305 lb).    Weight management plan: Discussed healthy diet and exercise guidelines    She reports that she has never smoked. She has never used smokeless tobacco.      Counseling Resources:  ATP IV Guidelines  Pooled Cohorts Equation Calculator  Breast Cancer Risk Calculator  BRCA-Related Cancer Risk Assessment: FHS-7 Tool  FRAX Risk Assessment  ICSI Preventive Guidelines  Dietary Guidelines for Americans, 2010  USDA's MyPlate  ASA Prophylaxis  Lung CA Screening    Mona Reid NP  [facility]

## 2021-11-05 ENCOUNTER — OFFICE VISIT (OUTPATIENT)
Dept: FAMILY MEDICINE | Facility: OTHER | Age: 31
End: 2021-11-05
Attending: NURSE PRACTITIONER
Payer: COMMERCIAL

## 2021-11-05 VITALS
BODY MASS INDEX: 45.99 KG/M2 | HEART RATE: 89 BPM | SYSTOLIC BLOOD PRESSURE: 112 MMHG | HEIGHT: 67 IN | WEIGHT: 293 LBS | DIASTOLIC BLOOD PRESSURE: 80 MMHG | OXYGEN SATURATION: 99 %

## 2021-11-05 DIAGNOSIS — R53.83 FATIGUE, UNSPECIFIED TYPE: ICD-10-CM

## 2021-11-05 DIAGNOSIS — Z11.59 NEED FOR HEPATITIS C SCREENING TEST: ICD-10-CM

## 2021-11-05 DIAGNOSIS — Z00.00 ROUTINE GENERAL MEDICAL EXAMINATION AT A HEALTH CARE FACILITY: Primary | ICD-10-CM

## 2021-11-05 DIAGNOSIS — R87.618 PAP SMEAR ABNORMALITY OF CERVIX/HUMAN PAPILLOMAVIRUS (HPV) POSITIVE: ICD-10-CM

## 2021-11-05 DIAGNOSIS — Z13.1 SCREENING FOR DIABETES MELLITUS: ICD-10-CM

## 2021-11-05 DIAGNOSIS — Z13.220 LIPID SCREENING: ICD-10-CM

## 2021-11-05 DIAGNOSIS — Z12.4 SCREENING FOR CERVICAL CANCER: ICD-10-CM

## 2021-11-05 DIAGNOSIS — Z13.0 SCREENING, ANEMIA, DEFICIENCY, IRON: ICD-10-CM

## 2021-11-05 LAB
ALBUMIN SERPL-MCNC: 3.6 G/DL (ref 3.4–5)
ALP SERPL-CCNC: 68 U/L (ref 40–150)
ALT SERPL W P-5'-P-CCNC: 26 U/L (ref 0–50)
ANION GAP SERPL CALCULATED.3IONS-SCNC: 6 MMOL/L (ref 3–14)
AST SERPL W P-5'-P-CCNC: 17 U/L (ref 0–45)
BASOPHILS # BLD AUTO: 0 10E3/UL (ref 0–0.2)
BASOPHILS NFR BLD AUTO: 0 %
BILIRUB SERPL-MCNC: 0.4 MG/DL (ref 0.2–1.3)
BUN SERPL-MCNC: 8 MG/DL (ref 7–30)
CALCIUM SERPL-MCNC: 8.5 MG/DL (ref 8.5–10.1)
CHLORIDE BLD-SCNC: 104 MMOL/L (ref 94–109)
CHOLEST SERPL-MCNC: 180 MG/DL
CO2 SERPL-SCNC: 27 MMOL/L (ref 20–32)
CREAT SERPL-MCNC: 0.68 MG/DL (ref 0.52–1.04)
EOSINOPHIL # BLD AUTO: 0.2 10E3/UL (ref 0–0.7)
EOSINOPHIL NFR BLD AUTO: 2 %
ERYTHROCYTE [DISTWIDTH] IN BLOOD BY AUTOMATED COUNT: 11.9 % (ref 10–15)
FASTING STATUS PATIENT QL REPORTED: YES
GFR SERPL CREATININE-BSD FRML MDRD: >90 ML/MIN/1.73M2
GLUCOSE BLD-MCNC: 75 MG/DL (ref 70–99)
HCT VFR BLD AUTO: 41.6 % (ref 35–47)
HDLC SERPL-MCNC: 60 MG/DL
HGB BLD-MCNC: 13.9 G/DL (ref 11.7–15.7)
IMM GRANULOCYTES # BLD: 0 10E3/UL
IMM GRANULOCYTES NFR BLD: 0 %
LDLC SERPL CALC-MCNC: 102 MG/DL
LYMPHOCYTES # BLD AUTO: 2.4 10E3/UL (ref 0.8–5.3)
LYMPHOCYTES NFR BLD AUTO: 26 %
MCH RBC QN AUTO: 30.7 PG (ref 26.5–33)
MCHC RBC AUTO-ENTMCNC: 33.4 G/DL (ref 31.5–36.5)
MCV RBC AUTO: 92 FL (ref 78–100)
MONOCYTES # BLD AUTO: 0.6 10E3/UL (ref 0–1.3)
MONOCYTES NFR BLD AUTO: 6 %
NEUTROPHILS # BLD AUTO: 5.9 10E3/UL (ref 1.6–8.3)
NEUTROPHILS NFR BLD AUTO: 66 %
NONHDLC SERPL-MCNC: 120 MG/DL
NRBC # BLD AUTO: 0 10E3/UL
NRBC BLD AUTO-RTO: 0 /100
PLATELET # BLD AUTO: 320 10E3/UL (ref 150–450)
POTASSIUM BLD-SCNC: 3.1 MMOL/L (ref 3.4–5.3)
PROT SERPL-MCNC: 7.5 G/DL (ref 6.8–8.8)
RBC # BLD AUTO: 4.53 10E6/UL (ref 3.8–5.2)
SODIUM SERPL-SCNC: 137 MMOL/L (ref 133–144)
TRIGL SERPL-MCNC: 92 MG/DL
TSH SERPL DL<=0.005 MIU/L-ACNC: 1.92 MU/L (ref 0.4–4)
WBC # BLD AUTO: 9 10E3/UL (ref 4–11)

## 2021-11-05 PROCEDURE — 80050 GENERAL HEALTH PANEL: CPT | Performed by: NURSE PRACTITIONER

## 2021-11-05 PROCEDURE — 36415 COLL VENOUS BLD VENIPUNCTURE: CPT | Performed by: NURSE PRACTITIONER

## 2021-11-05 PROCEDURE — 86803 HEPATITIS C AB TEST: CPT | Performed by: NURSE PRACTITIONER

## 2021-11-05 PROCEDURE — 87624 HPV HI-RISK TYP POOLED RSLT: CPT | Performed by: NURSE PRACTITIONER

## 2021-11-05 PROCEDURE — 99395 PREV VISIT EST AGE 18-39: CPT | Performed by: NURSE PRACTITIONER

## 2021-11-05 PROCEDURE — 80061 LIPID PANEL: CPT | Performed by: NURSE PRACTITIONER

## 2021-11-05 PROCEDURE — G0145 SCR C/V CYTO,THINLAYER,RESCR: HCPCS | Performed by: NURSE PRACTITIONER

## 2021-11-05 ASSESSMENT — PAIN SCALES - GENERAL: PAINLEVEL: NO PAIN (0)

## 2021-11-05 ASSESSMENT — MIFFLIN-ST. JEOR: SCORE: 2126.1

## 2021-11-05 NOTE — NURSING NOTE
"Chief Complaint   Patient presents with     Physical     complete physical        Initial /80 (BP Location: Right arm, Patient Position: Chair, Cuff Size: Adult Large)   Pulse 89   Ht 1.702 m (5' 7\")   Wt 138.3 kg (305 lb)   LMP 10/18/2021   SpO2 99%   BMI 47.77 kg/m   Estimated body mass index is 47.77 kg/m  as calculated from the following:    Height as of this encounter: 1.702 m (5' 7\").    Weight as of this encounter: 138.3 kg (305 lb).  Medication Reconciliation: complete  Josefina Rothman LPN  "

## 2021-11-08 LAB — HCV AB SERPL QL IA: NONREACTIVE

## 2021-11-10 LAB
BKR LAB AP GYN ADEQUACY: NORMAL
BKR LAB AP GYN INTERPRETATION: NORMAL
BKR LAB AP HPV REFLEX: NORMAL
BKR LAB AP PREVIOUS ABNL DX: NORMAL
BKR LAB AP PREVIOUS ABNORMAL: NORMAL
PATH REPORT.COMMENTS IMP SPEC: NORMAL
PATH REPORT.COMMENTS IMP SPEC: NORMAL
PATH REPORT.RELEVANT HX SPEC: NORMAL

## 2021-11-12 LAB
HUMAN PAPILLOMA VIRUS 16 DNA: NEGATIVE
HUMAN PAPILLOMA VIRUS 18 DNA: NEGATIVE
HUMAN PAPILLOMA VIRUS FINAL DIAGNOSIS: NORMAL
HUMAN PAPILLOMA VIRUS OTHER HR: NEGATIVE

## 2021-11-19 ENCOUNTER — MYC MEDICAL ADVICE (OUTPATIENT)
Dept: FAMILY MEDICINE | Facility: OTHER | Age: 31
End: 2021-11-19
Payer: COMMERCIAL

## 2021-11-19 DIAGNOSIS — Z32.00 POSSIBLE PREGNANCY: Primary | ICD-10-CM

## 2021-11-26 ENCOUNTER — LAB (OUTPATIENT)
Dept: LAB | Facility: OTHER | Age: 31
End: 2021-11-26
Payer: COMMERCIAL

## 2021-11-26 DIAGNOSIS — Z32.01 PREGNANCY TEST POSITIVE: Primary | ICD-10-CM

## 2021-11-26 DIAGNOSIS — Z32.00 POSSIBLE PREGNANCY: ICD-10-CM

## 2021-11-26 LAB — B-HCG SERPL-ACNC: 2694 IU/L (ref 0–5)

## 2021-11-26 PROCEDURE — 36415 COLL VENOUS BLD VENIPUNCTURE: CPT

## 2021-11-26 PROCEDURE — 84702 CHORIONIC GONADOTROPIN TEST: CPT

## 2021-12-10 ENCOUNTER — PRENATAL OFFICE VISIT (OUTPATIENT)
Dept: OBGYN | Facility: OTHER | Age: 31
End: 2021-12-10
Attending: OBSTETRICS & GYNECOLOGY
Payer: COMMERCIAL

## 2021-12-10 VITALS
SYSTOLIC BLOOD PRESSURE: 108 MMHG | BODY MASS INDEX: 45.99 KG/M2 | HEIGHT: 67 IN | WEIGHT: 293 LBS | DIASTOLIC BLOOD PRESSURE: 68 MMHG | OXYGEN SATURATION: 99 % | HEART RATE: 105 BPM

## 2021-12-10 DIAGNOSIS — Z32.01 PREGNANCY TEST POSITIVE: ICD-10-CM

## 2021-12-10 DIAGNOSIS — Z34.91 PREGNANCY WITH UNCERTAIN DATES IN FIRST TRIMESTER: ICD-10-CM

## 2021-12-10 DIAGNOSIS — E66.813 CLASS 3 SEVERE OBESITY DUE TO EXCESS CALORIES WITHOUT SERIOUS COMORBIDITY WITH BODY MASS INDEX (BMI) OF 45.0 TO 49.9 IN ADULT (H): Primary | ICD-10-CM

## 2021-12-10 DIAGNOSIS — E66.01 CLASS 3 SEVERE OBESITY DUE TO EXCESS CALORIES WITHOUT SERIOUS COMORBIDITY WITH BODY MASS INDEX (BMI) OF 45.0 TO 49.9 IN ADULT (H): Primary | ICD-10-CM

## 2021-12-10 DIAGNOSIS — Z34.01 PREGNANCY, SUPERVISION OF FIRST, FIRST TRIMESTER: ICD-10-CM

## 2021-12-10 LAB
ABO/RH(D): NORMAL
ANTIBODY SCREEN: NEGATIVE
ERYTHROCYTE [DISTWIDTH] IN BLOOD BY AUTOMATED COUNT: 11.9 % (ref 10–15)
HCT VFR BLD AUTO: 41 % (ref 35–47)
HGB BLD-MCNC: 13.8 G/DL (ref 11.7–15.7)
MCH RBC QN AUTO: 30.8 PG (ref 26.5–33)
MCHC RBC AUTO-ENTMCNC: 33.7 G/DL (ref 31.5–36.5)
MCV RBC AUTO: 92 FL (ref 78–100)
PLATELET # BLD AUTO: 386 10E3/UL (ref 150–450)
RBC # BLD AUTO: 4.48 10E6/UL (ref 3.8–5.2)
SPECIMEN EXPIRATION DATE: NORMAL
WBC # BLD AUTO: 10.1 10E3/UL (ref 4–11)

## 2021-12-10 PROCEDURE — 86850 RBC ANTIBODY SCREEN: CPT | Performed by: OBSTETRICS & GYNECOLOGY

## 2021-12-10 PROCEDURE — 99207 PR FIRST OB VISIT: CPT | Performed by: OBSTETRICS & GYNECOLOGY

## 2021-12-10 PROCEDURE — 86900 BLOOD TYPING SEROLOGIC ABO: CPT | Performed by: OBSTETRICS & GYNECOLOGY

## 2021-12-10 PROCEDURE — 86803 HEPATITIS C AB TEST: CPT | Performed by: OBSTETRICS & GYNECOLOGY

## 2021-12-10 PROCEDURE — 76817 TRANSVAGINAL US OBSTETRIC: CPT | Performed by: OBSTETRICS & GYNECOLOGY

## 2021-12-10 PROCEDURE — 86780 TREPONEMA PALLIDUM: CPT | Performed by: OBSTETRICS & GYNECOLOGY

## 2021-12-10 PROCEDURE — 85027 COMPLETE CBC AUTOMATED: CPT | Performed by: OBSTETRICS & GYNECOLOGY

## 2021-12-10 PROCEDURE — 87340 HEPATITIS B SURFACE AG IA: CPT | Performed by: OBSTETRICS & GYNECOLOGY

## 2021-12-10 PROCEDURE — 87389 HIV-1 AG W/HIV-1&-2 AB AG IA: CPT | Performed by: OBSTETRICS & GYNECOLOGY

## 2021-12-10 PROCEDURE — 36415 COLL VENOUS BLD VENIPUNCTURE: CPT | Performed by: OBSTETRICS & GYNECOLOGY

## 2021-12-10 PROCEDURE — 86901 BLOOD TYPING SEROLOGIC RH(D): CPT | Performed by: OBSTETRICS & GYNECOLOGY

## 2021-12-10 PROCEDURE — 86762 RUBELLA ANTIBODY: CPT | Performed by: OBSTETRICS & GYNECOLOGY

## 2021-12-10 ASSESSMENT — PAIN SCALES - GENERAL: PAINLEVEL: NO PAIN (0)

## 2021-12-10 ASSESSMENT — MIFFLIN-ST. JEOR: SCORE: 2117.03

## 2021-12-10 NOTE — NURSING NOTE
"Chief Complaint   Patient presents with     Prenatal Care     newly pregnant with LMP of 10/18/21       Initial /68 (BP Location: Left arm, Patient Position: Sitting, Cuff Size: Adult Large)   Pulse 105   Ht 1.702 m (5' 7\")   Wt 137.4 kg (303 lb)   LMP 10/18/2021   SpO2 99%   BMI 47.46 kg/m   Estimated body mass index is 47.46 kg/m  as calculated from the following:    Height as of this encounter: 1.702 m (5' 7\").    Weight as of this encounter: 137.4 kg (303 lb).  Medication Reconciliation: complete  KATHERINE TRACY LPN  "

## 2021-12-10 NOTE — PROGRESS NOTES
"  HPI:  Destini Tee is a 31 year old female  Patient's last menstrual period was 10/18/2021. at Unknown, Estimated Date of Delivery: Data Unavailable.  She denies vaginal bleeding, nausea , vomiting and abdominal pain.   No other c/o.    No past medical history on file.    No past surgical history on file.    Allergies: No known drug allergy     ROS:   Denies fever, wt loss   Neg /GI other than per HPI      EXAM:  Blood pressure 108/68, pulse 105, height 1.702 m (5' 7\"), weight 137.4 kg (303 lb), last menstrual period 10/18/2021, SpO2 99 %, not currently breastfeeding.   BMI= Body mass index is 47.46 kg/m .  General - pleasant female in no acute distress.  Abdomen - soft, nontender, nondistended, no hepatosplenomegaly.  Pelvic - EG: normal adult female, BUS: within normal limits,Rectovaginal - deferred.    US:    Transvaginal:Yes  Yolk sac present:Yes  CRL:  11mm  FCA present:Yes  EGA 7w 2d  STIVEN:cwd          ASSESSMENT/PLAN:  (Z32.01) Pregnancy test positive  Comment: viable IUP with concordant dates.  Use US STIVEN.   Plan: HIV Antigen Antibody Combo, Rubella Antibody         IgG, Hepatitis B surface antigen, Treponema Abs        w Reflex to RPR and Titer, Urine Culture, UA         with Microscopic, CBC with platelets, Hepatitis        C antibody, ABO/Rh type and screen, Drugs of         Abuse    Class 3 obesity.  Wt gain, anesthesia concerns and delivery options discussed with pt.  Consider baby asa second trimester for Pre-E prevention    1st Trimester precautions and testing discussed.  New OB Labs ordered and exam scheduled.  Aneuploidy testing options discussed.    Patient has my card and phone number to call prn if problems in interim.    Remy Toney MD  " clear

## 2021-12-12 LAB — T PALLIDUM AB SER QL: NONREACTIVE

## 2021-12-13 LAB
HBV SURFACE AG SERPL QL IA: NONREACTIVE
HCV AB SERPL QL IA: NONREACTIVE
HIV 1+2 AB+HIV1 P24 AG SERPL QL IA: NONREACTIVE
RUBV IGG SERPL QL IA: 0.68 INDEX
RUBV IGG SERPL QL IA: NORMAL

## 2021-12-14 PROBLEM — O09.899 RUBELLA NON-IMMUNE STATUS, ANTEPARTUM: Status: ACTIVE | Noted: 2021-12-14

## 2021-12-14 PROBLEM — Z28.39 RUBELLA NON-IMMUNE STATUS, ANTEPARTUM: Status: ACTIVE | Noted: 2021-12-14

## 2021-12-14 PROBLEM — Z67.91 RH NEGATIVE STATE IN ANTEPARTUM PERIOD: Status: ACTIVE | Noted: 2021-12-14

## 2021-12-14 PROBLEM — O26.899 RH NEGATIVE STATE IN ANTEPARTUM PERIOD: Status: ACTIVE | Noted: 2021-12-14

## 2022-01-06 DIAGNOSIS — Z34.01 FIRST PREGNANCY, FIRST TRIMESTER: Primary | ICD-10-CM

## 2022-01-07 ENCOUNTER — PRENATAL OFFICE VISIT (OUTPATIENT)
Dept: OBGYN | Facility: OTHER | Age: 32
End: 2022-01-07
Attending: NURSE PRACTITIONER
Payer: COMMERCIAL

## 2022-01-07 ENCOUNTER — LAB (OUTPATIENT)
Dept: LAB | Facility: OTHER | Age: 32
End: 2022-01-07
Attending: NURSE PRACTITIONER
Payer: COMMERCIAL

## 2022-01-07 VITALS
HEIGHT: 67 IN | HEART RATE: 106 BPM | WEIGHT: 293 LBS | OXYGEN SATURATION: 99 % | SYSTOLIC BLOOD PRESSURE: 107 MMHG | DIASTOLIC BLOOD PRESSURE: 62 MMHG | BODY MASS INDEX: 45.99 KG/M2

## 2022-01-07 DIAGNOSIS — Z34.01 FIRST PREGNANCY, FIRST TRIMESTER: ICD-10-CM

## 2022-01-07 DIAGNOSIS — Z34.01 ENCOUNTER FOR SUPERVISION OF NORMAL FIRST PREGNANCY IN FIRST TRIMESTER: Primary | ICD-10-CM

## 2022-01-07 LAB
AMPHETAMINES UR QL: NOT DETECTED
BARBITURATES UR QL SCN: NOT DETECTED
BENZODIAZ UR QL SCN: NOT DETECTED
BUPRENORPHINE UR QL: NOT DETECTED
CANNABINOIDS UR QL: NOT DETECTED
COCAINE UR QL SCN: NOT DETECTED
D-METHAMPHET UR QL: NOT DETECTED
METHADONE UR QL SCN: NOT DETECTED
OPIATES UR QL SCN: NOT DETECTED
OXYCODONE UR QL SCN: NOT DETECTED
PCP UR QL SCN: NOT DETECTED
PROPOXYPH UR QL: NOT DETECTED
TRICYCLICS UR QL SCN: NOT DETECTED

## 2022-01-07 PROCEDURE — 81001 URINALYSIS AUTO W/SCOPE: CPT | Performed by: OBSTETRICS & GYNECOLOGY

## 2022-01-07 PROCEDURE — 99207 PR PRENATAL VISIT: CPT | Performed by: NURSE PRACTITIONER

## 2022-01-07 PROCEDURE — 87086 URINE CULTURE/COLONY COUNT: CPT | Performed by: OBSTETRICS & GYNECOLOGY

## 2022-01-07 PROCEDURE — 86787 VARICELLA-ZOSTER ANTIBODY: CPT | Performed by: NURSE PRACTITIONER

## 2022-01-07 PROCEDURE — 87491 CHLMYD TRACH DNA AMP PROBE: CPT

## 2022-01-07 PROCEDURE — 80306 DRUG TEST PRSMV INSTRMNT: CPT

## 2022-01-07 PROCEDURE — 36415 COLL VENOUS BLD VENIPUNCTURE: CPT | Performed by: NURSE PRACTITIONER

## 2022-01-07 PROCEDURE — 87591 N.GONORRHOEAE DNA AMP PROB: CPT

## 2022-01-07 ASSESSMENT — MIFFLIN-ST. JEOR: SCORE: 2083.46

## 2022-01-07 ASSESSMENT — PAIN SCALES - GENERAL: PAINLEVEL: NO PAIN (0)

## 2022-01-07 NOTE — PROGRESS NOTES
Have you had or do you currently have:    - Diabetes? N  - Hypertension? N  - Heart disease, mitral valve prolapse, or rheumatic fever?  N  - An autoimmune disease such as lupus or rheumatoid arthritis?  N  - Kidney disease, urinary tract infection?  N  - Epilepsy, seizures, or spells?  N  - Migraine headaches?  N  - Any other neurological problems?  N  - Have you ever been treated for depression?  N  - Are you having problems with crying spells or loss of self-esteem?  N  - Have you ever required psychiatric care?  N  - Have you ever had hepatitis, liver disease, or jaundice?  N  - Have you ever been treated for blood clots in your veins, deep vein thrombosis, inflammation in the veins, thrombosis, phelbitis, pulmonary embolism or varicosities?  N  - Have you had excessive bleeding after surgery or dental work?  N  - Do you bleed more than other women after a cut or scratch?  N  - Do you have a history or anemia?  N  - Have you ever had thyroid problems or take thyroid medication?  N  - Do you have any endocrine problems?  N  - Have you every been in a major accident or suffered serious trauma?  N  - Within the last year, has anyone hit, slapped, kicked, or otherwise hurt you?  N  - In the last year, has anyone forced you to have sex when you didn't want to?  N  - Have you every received a blood transfusion?  N  - Would you refuse a blood transfusion if a doctor judged it to be medically necessary?  N  - Does anyone in your home smoke? N  - Do you use tobacco products?  N  - Do you drink beer, wine, or hard liquor?  N  - Do you use any of the following: marijuana, speed, cocaine, heroin, hallucinogens, or other drugs?  N  - Is your blood type RH negative?  Y  - Have you ever had asthma?  N  - Have you ever had tuberculosis?  N  - Do you have any allergies to drugs or over-the-counter medications?  N  - Allergies: dust mites, aspartame, ethanol, venlafaxine hydrochloride, sertraline?  N  - Have you had any breast  problems?  N  - Have you ever breast-fed?  N  - Have you had any gynecological surgical procedures such as cervical conization, LEEP, laser treatment, cryosurgery of the cervix, or a dilatation and curettage, etc?  N  - Have you ever had any other surgical procedures?  N  - Have you ever had any anesthetic complications?  N  - Have you ever had an abnormal pap smear?  N  - Do you have a history of abnormalities of the uterus? N  - Did your mother take SARBJIT or any other hormones when she was pregnant with you?  N  - Did it take you more than one year to become pregnant?  N  - Have you ever been evaluated or treated for infertility?  N  - Is there a history of medical problems in your family, which you feel might adversely affect your health or pregnancy?  N  - Do you have any other problems we have not asked about which you feel may be important to this pregnancy?  N    Symptoms since last menstrual period  - Do you currently have any of the following symptoms: abdominal pain, blood in the stool or urine, chest pain, shortness of breath, coughing or vomiting up blood, you heart is racing or skipping beats, nausea and vomiting, pain on urination, or vaginal discharge or bleeding?  N    Genetic screening  Has the patient, baby's father, or anyone in either family had:  - Thalassemia (Italian, Greek, Mediterranean, or  background only) and an MCV result less than 80?  N  - Neural tube defect such as meningomyelocele, spina bifida, or anencephaly?  N  - Congential heart defect?  N  - Down's syndrome?  N  - Scout-Sachs disease ( Quaker, Cajun, Greenlandic-Ware)?  N  - Sickle cell disease or trait () ?  N  - Hemophilia or other inherited problems of blood?  N  - Muscular dystrophy?  N  - Cystic fibrosis?  N  - Leola's chorea?  N  - Mental retardation/autism?  N   If yes, was the person tested for Fragile X?  N  - Any other inherited genetic or chromosomal disorder?  N  - Maternal metabolic disorder (e.g.  insulin- dependent diabetes, PKU)?  N  - A child with birth defects not listed above?  N  - Recurrent pregnancy loss, or a stillbirth?  N  - Has the patient had any medications/street drugs/alcohol since her last menstrual period?  N  - Does the patient or baby's father have any other genetic risk?  N    Infection history  - Have you ever been treated for tuberculosis?  N  - Have you every had a positive skin test for tuberculosis?  n  - Do you live with someone who has tuberculosis?  n  - Have you ever been exposed to tuberculosis?  n  - Do you have genital herpes?  n  - Does your partner have genital herpes?  n  - Have you had a rash or viral illness since your last period?  n  - Have you ever had gonorrhea, chlamydia, syphilis, venereal warts, trichomoniasis, pelvic inflammatory disease, or any other sexually transmitted disease?  n  - Have you had chicken pox?  y  - Have you been vaccinated against chicken pox?  n  - Have you had any other infectious diseases?  n

## 2022-01-07 NOTE — NURSING NOTE
"Chief Complaint   Patient presents with     Prenatal Care     New OB 11 weeks 4 days       Initial /62 (BP Location: Right arm, Patient Position: Sitting, Cuff Size: Adult Large)   Pulse 106   Ht 1.702 m (5' 7\")   Wt 134.1 kg (295 lb 9.6 oz)   LMP 10/18/2021   SpO2 99%   BMI 46.30 kg/m   Estimated body mass index is 46.3 kg/m  as calculated from the following:    Height as of this encounter: 1.702 m (5' 7\").    Weight as of this encounter: 134.1 kg (295 lb 9.6 oz).  Medication Reconciliation: complete     Jessika oWody LPN    "

## 2022-01-08 LAB
C TRACH DNA SPEC QL PROBE+SIG AMP: NEGATIVE
N GONORRHOEA DNA SPEC QL NAA+PROBE: NEGATIVE

## 2022-01-10 PROBLEM — Z34.01 FIRST PREGNANCY, FIRST TRIMESTER: Status: ACTIVE | Noted: 2022-01-10

## 2022-01-10 LAB
VZV IGG SER QL IA: 267.3 INDEX
VZV IGG SER QL IA: POSITIVE

## 2022-01-10 NOTE — PROGRESS NOTES
"  HPI:  Destini Tee is a 31 year old female Patient's last menstrual period was 10/18/2021. at 12w0d, Estimated Date of Delivery: Jul 25, 2022.  She denies vaginal bleeding and abdominal pain.    No other c/o.    No past medical history on file.    No past surgical history on file.    Allergies: No known drug allergy     EXAM:  Blood pressure 107/62, pulse 106, height 1.702 m (5' 7\"), weight 134.1 kg (295 lb 9.6 oz), last menstrual period 10/18/2021, SpO2 99 %, not currently breastfeeding.   BMI= Body mass index is 46.3 kg/m .  General - pleasant female in no acute distress.  Neck - supple without lymphadenopathy or thyromegaly.  Lungs - clear to auscultation bilaterally.  Heart - regular rate and rhythm without murmur.  Abdomen - soft, nontender, nondistended, no hepatosplenomegaly.  Pelvic - EG: normal adult female, BUS: within normal limits, Vagina: well rugated, no discharge, Cervix: no lesions or CMT, closed/long Uterus: gravid, consistant with dates, mobile, Adnexae: no masses or tenderness.  Rectovaginal - deferred.  Musculoskeletal - no gross deformities.  Neurological - normal strength, sensation, and mental status.    Doptones were +    ASSESSMENT/PLAN:  (Z34.00) Supervision of normal first pregnancy  (primary encounter diagnosis)  Comment: new ob physical and teaching completed  Plan: Varicella Zoster Virus Antibody IgG, CANCELED:         [facility] Non-Invasive Prenatal Screening            (Z34.01) First pregnancy, first trimester  Comment:   Plan: return in 4 weeks.      Weight gain and exercise during pregnancy was discussed at today's visit.  The patient will return to clinic in 4 weeks for continued prenatal care.  "

## 2022-01-11 ENCOUNTER — LAB (OUTPATIENT)
Dept: LAB | Facility: OTHER | Age: 32
End: 2022-01-11
Payer: COMMERCIAL

## 2022-01-11 DIAGNOSIS — Z34.02 FIRST PREGNANCY, SECOND TRIMESTER: ICD-10-CM

## 2022-01-11 DIAGNOSIS — Z34.02 FIRST PREGNANCY, SECOND TRIMESTER: Primary | ICD-10-CM

## 2022-01-11 PROCEDURE — 36415 COLL VENOUS BLD VENIPUNCTURE: CPT

## 2022-01-11 ASSESSMENT — PATIENT HEALTH QUESTIONNAIRE - PHQ9: SUM OF ALL RESPONSES TO PHQ QUESTIONS 1-9: 0

## 2022-01-12 DIAGNOSIS — E66.01 MORBID OBESITY (H): Primary | ICD-10-CM

## 2022-01-12 DIAGNOSIS — Z34.02 FIRST PREGNANCY, SECOND TRIMESTER: ICD-10-CM

## 2022-01-18 LAB — SCANNED LAB RESULT: NORMAL

## 2022-02-11 ENCOUNTER — LAB (OUTPATIENT)
Dept: LAB | Facility: OTHER | Age: 32
End: 2022-02-11
Attending: NURSE PRACTITIONER
Payer: COMMERCIAL

## 2022-02-11 ENCOUNTER — PRENATAL OFFICE VISIT (OUTPATIENT)
Dept: OBGYN | Facility: OTHER | Age: 32
End: 2022-02-11
Attending: NURSE PRACTITIONER
Payer: COMMERCIAL

## 2022-02-11 VITALS
SYSTOLIC BLOOD PRESSURE: 110 MMHG | HEART RATE: 117 BPM | DIASTOLIC BLOOD PRESSURE: 70 MMHG | BODY MASS INDEX: 45.96 KG/M2 | OXYGEN SATURATION: 100 % | WEIGHT: 292.8 LBS | HEIGHT: 67 IN

## 2022-02-11 DIAGNOSIS — Z34.02 FIRST PREGNANCY, SECOND TRIMESTER: ICD-10-CM

## 2022-02-11 DIAGNOSIS — Z34.02 FIRST PREGNANCY, SECOND TRIMESTER: Primary | ICD-10-CM

## 2022-02-11 DIAGNOSIS — E66.01 MORBID OBESITY (H): ICD-10-CM

## 2022-02-11 LAB
ALBUMIN UR-MCNC: NEGATIVE MG/DL
APPEARANCE UR: CLEAR
BILIRUB UR QL STRIP: NEGATIVE
COLOR UR AUTO: YELLOW
GLUCOSE 1H P 50 G GLC PO SERPL-MCNC: 99 MG/DL (ref 70–129)
GLUCOSE UR STRIP-MCNC: NEGATIVE MG/DL
HGB UR QL STRIP: NEGATIVE
KETONES UR STRIP-MCNC: 40 MG/DL
LEUKOCYTE ESTERASE UR QL STRIP: NEGATIVE
NITRATE UR QL: NEGATIVE
PH UR STRIP: 6.5 [PH] (ref 4.7–8)
SP GR UR STRIP: 1.01 (ref 1–1.03)
UROBILINOGEN UR STRIP-MCNC: NORMAL MG/DL

## 2022-02-11 PROCEDURE — 81003 URINALYSIS AUTO W/O SCOPE: CPT

## 2022-02-11 PROCEDURE — 99207 PR PRENATAL VISIT: CPT | Performed by: NURSE PRACTITIONER

## 2022-02-11 PROCEDURE — 82950 GLUCOSE TEST: CPT

## 2022-02-11 PROCEDURE — 87086 URINE CULTURE/COLONY COUNT: CPT

## 2022-02-11 PROCEDURE — 36415 COLL VENOUS BLD VENIPUNCTURE: CPT

## 2022-02-11 RX ORDER — ASPIRIN 81 MG/1
81 TABLET ORAL DAILY
COMMUNITY
End: 2022-09-02

## 2022-02-11 ASSESSMENT — MIFFLIN-ST. JEOR: SCORE: 2070.76

## 2022-02-11 ASSESSMENT — PAIN SCALES - GENERAL: PAINLEVEL: NO PAIN (0)

## 2022-02-11 NOTE — PATIENT INSTRUCTIONS
Patient Education     Pregnancy: Your Second Trimester Changes  Each day, you and your baby are changing and growing together. Here s a quick look at what s happening to both of you.  How you are changing  Even when you don t notice it, your body is adapting to meet the needs of your growing baby. The changes in your body might also affect your moods.  Your body  Your uterus expands as baby grows. As the weeks go by, you will feel more pressure on your bladder, stomach, and other organs. You may notice some skin color changes on your forehead, nose, or cheeks. Freckles may darken, and moles may grow. You may notice a darker line on your abdomen between your belly button and pubic bone in the midline.  Your moods  The second trimester is often easier than the first. Still, be prepared for mood swings. These are due to the increase in hormones (chemicals that affect the way organs work) produced by your body. These mood swings are a normal part of pregnancy.  How your baby is growing          Month 4  Baby s heartbeat may be heard with a Doppler (hand-held ultrasound device) by 9 to 10 weeks. Eyebrows, eyelashes, and fingernails begin to form.  Month 5  You may feel your baby move. After a growth spurt, your baby nears 10 inches. Month 6  Baby s fingerprints have formed. Your baby weighs about 1 to 2 pounds (0.45 to 0.9 kg) and is about 12 inches long.   [employer] last reviewed this educational content on 1/1/2018 2000-2021 [employer]. All rights reserved. This information is not intended as a substitute for professional medical care. Always follow your healthcare professional's instructions.           Patient Education     Adapting to Pregnancy: Second Trimester    Keep up the healthy habits you started in your first trimester. You might be a little more tired than normal. So plan your day wisely. Look at the tips below and choose the ones that suit your lifestyle.  If you have any questions, check  with your healthcare provider.  If you work  If you can, adjust your work with your employer to fit your needs. Try these tips:    If you stand for long periods, find ways to do some tasks while sitting. Also, try to stand with 1 foot resting on a low stool or ledge. Shift your weight from foot to foot often. Wear low-heeled shoes.    If you sit, keep your knees level with your hips. Rest your feet on a firm surface. Sit tall with support for your low back.    If you work long hours, ask about adjusting your schedule. Try taking shorter breaks more often.  When you travel  The second trimester may be the best time for any travel. Talk to your healthcare provider about any special plans you may need to make. Always:    Wear a seat belt. Fasten the lap part under your belly. Wear the shoulder part also.    Take breaks often during long trips by car or plane. Move around to stretch your legs.    Drink plenty of fluids on flights. The air in plane cabins is very dry.    Avoid hot climates or high altitudes if you are not used to them.    Avoid places where the food and water might make you sick.    Make sure you are up-to-date on all immunizations, including the flu vaccine. This is especially important when traveling overseas.  Taking time to relax  Find time to rest and relax at work or at home:    Take short time-outs daily. Do relaxation exercises.    Breathe deeply during stressful times.    Try not to take on too much. Plan tasks for times when you have the most energy.    Take naps when you can. Or just sit and relax.    After week 16, avoid lying on your back for more than a few minutes. Instead, lie on your side. Switch sides often.  Continuing as lovers  Unless your healthcare provider tells you otherwise, there is no reason to stop having sex now. Blood supply increases to the pelvic area in the second trimester. Because of this, sex might be more enjoyable. Try different positions and see what s best. Also,  talk to your partner about any changes in desire. Spotting may happen after sex. Be sure to let your healthcare provider know if there is heavy bleeding.  Keeping your environment safe  You can still clean house and use scented products. Just take some simple precautions:    Wear gloves when using cleaning fluids.    Open windows to let in fresh air. Use a fan if you paint.    Avoid secondhand smoke.    Don t breathe fumes from nail polish, hair spray, cleansers, or other chemicals.  [employer] last reviewed this educational content on 1/1/2018 2000-2021 [employer]. All rights reserved. This information is not intended as a substitute for professional medical care. Always follow your healthcare professional's instructions.           Patient Education     Understanding Blood Sugar During Pregnancy  Gestational diabetes causes high blood sugar levels during pregnancy. You are at risk of developing, or perhaps have already developed, gestational diabetes. Controlling your blood sugar can help prevent problems for you and your baby.  Your body turns food into blood sugar  As food is digested, it turns into sugar (glucose), a fuel that feeds your body. This sugar goes into your bloodstream. Your body then releases a substance called insulin to help your body use blood sugar properly.    Blood sugar goes to your baby  The placenta is where nutrients in your blood are exchanged with your baby s blood. Your blood sugar goes to your baby from the placenta through the umbilical cord. Your baby uses this sugar to grow.  Too much blood sugar affects you and your baby  During pregnancy, the placenta makes hormones that can disrupt the way your body uses insulin. If your body can t use insulin properly, your blood sugar level gets too high. Then too much blood sugar goes to your baby. This can cause problems for both you and your baby.  Controlling your blood sugar helps prevent problems  You can lower your blood  sugar by eating right, exercising, and taking medicines that your healthcare provider prescribes to control your blood sugar. If you keep your blood sugar in control, the risks to you and your baby are the same as those for a normal pregnancy.  [employer] last reviewed this educational content on 2/1/2018 2000-2021 [employer]. All rights reserved. This information is not intended as a substitute for professional medical care. Always follow your healthcare professional's instructions.

## 2022-02-11 NOTE — NURSING NOTE
"Chief Complaint   Patient presents with     Prenatal Care     16 weeks 4 days       Initial /70 (BP Location: Right arm, Patient Position: Sitting, Cuff Size: Adult Large)   Pulse 117   Ht 1.702 m (5' 7\")   Wt 132.8 kg (292 lb 12.8 oz)   LMP 10/18/2021   SpO2 100%   BMI 45.86 kg/m   Estimated body mass index is 45.86 kg/m  as calculated from the following:    Height as of this encounter: 1.702 m (5' 7\").    Weight as of this encounter: 132.8 kg (292 lb 12.8 oz).  Medication Reconciliation: complete     Jessika Woody LPN    "

## 2022-02-11 NOTE — PROGRESS NOTES
Doing well.  No n/v or fatigue.  No bleeding or cramping.  Repeat UA and early glucose today.  Diet and exercise reviewed. NIPT results reviewed.   Upcoming level II discussed.  Return in 4 weeks.

## 2022-02-13 LAB — BACTERIA UR CULT: NORMAL

## 2022-03-14 ENCOUNTER — HOSPITAL ENCOUNTER (OUTPATIENT)
Dept: ULTRASOUND IMAGING | Facility: HOSPITAL | Age: 32
Discharge: HOME | End: 2022-03-14
Attending: NURSE PRACTITIONER | Admitting: NURSE PRACTITIONER
Payer: COMMERCIAL

## 2022-03-14 DIAGNOSIS — Z34.02 FIRST PREGNANCY, SECOND TRIMESTER: ICD-10-CM

## 2022-03-14 PROCEDURE — 76805 OB US >/= 14 WKS SNGL FETUS: CPT

## 2022-03-18 ENCOUNTER — PRENATAL OFFICE VISIT (OUTPATIENT)
Dept: OBGYN | Facility: OTHER | Age: 32
End: 2022-03-18
Attending: NURSE PRACTITIONER
Payer: COMMERCIAL

## 2022-03-18 VITALS
HEART RATE: 114 BPM | OXYGEN SATURATION: 100 % | BODY MASS INDEX: 45.74 KG/M2 | SYSTOLIC BLOOD PRESSURE: 105 MMHG | HEIGHT: 67 IN | WEIGHT: 291.4 LBS | DIASTOLIC BLOOD PRESSURE: 76 MMHG

## 2022-03-18 DIAGNOSIS — Z34.02 FIRST PREGNANCY, SECOND TRIMESTER: Primary | ICD-10-CM

## 2022-03-18 PROCEDURE — 99207 PR PRENATAL VISIT: CPT | Performed by: NURSE PRACTITIONER

## 2022-03-18 ASSESSMENT — PAIN SCALES - GENERAL: PAINLEVEL: NO PAIN (0)

## 2022-03-18 NOTE — PROGRESS NOTES
Doing well.  Denies concerns.  Baby active.  US reviewed.  Current sinus issues and increased fatigue; covid testing encouraged.  Mid pregnancy changes and comfort measures reviewed.  Return in 4 weeks.

## 2022-03-18 NOTE — NURSING NOTE
"Chief Complaint   Patient presents with     Prenatal Care     21 weeks 4 days       Initial /76 (BP Location: Right arm, Patient Position: Sitting, Cuff Size: Adult Large)   Pulse 114   Ht 1.702 m (5' 7\")   Wt 132.2 kg (291 lb 6.4 oz)   LMP 10/18/2021   SpO2 100%   BMI 45.64 kg/m   Estimated body mass index is 45.64 kg/m  as calculated from the following:    Height as of this encounter: 1.702 m (5' 7\").    Weight as of this encounter: 132.2 kg (291 lb 6.4 oz).  Medication Reconciliation: complete     Jessika Woody LPN    "

## 2022-03-18 NOTE — PATIENT INSTRUCTIONS
Patient Education     Pregnancy: Your Second Trimester Changes  Each day, you and your baby are changing and growing together. Here s a quick look at what s happening to both of you.  How you are changing  Even when you don t notice it, your body is adapting to meet the needs of your growing baby. The changes in your body might also affect your moods.  Your body  Your uterus expands as baby grows. As the weeks go by, you will feel more pressure on your bladder, stomach, and other organs. You may notice some skin color changes on your forehead, nose, or cheeks. Freckles may darken, and moles may grow. You may notice a darker line on your abdomen between your belly button and pubic bone in the midline.  Your moods  The second trimester is often easier than the first. Still, be prepared for mood swings. These are due to the increase in hormones (chemicals that affect the way organs work) produced by your body. These mood swings are a normal part of pregnancy.  How your baby is growing          Month 4  Baby s heartbeat may be heard with a Doppler (hand-held ultrasound device) by 9 to 10 weeks. Eyebrows, eyelashes, and fingernails begin to form.  Month 5  You may feel your baby move. After a growth spurt, your baby nears 10 inches. Month 6  Baby s fingerprints have formed. Your baby weighs about 1 to 2 pounds (0.45 to 0.9 kg) and is about 12 inches long.   [employer] last reviewed this educational content on 1/1/2018 2000-2021 [employer]. All rights reserved. This information is not intended as a substitute for professional medical care. Always follow your healthcare professional's instructions.           Patient Education     Adapting to Pregnancy: Second Trimester    Keep up the healthy habits you started in your first trimester. You might be a little more tired than normal. So plan your day wisely. Look at the tips below and choose the ones that suit your lifestyle.  If you have any questions, check  with your healthcare provider.  If you work  If you can, adjust your work with your employer to fit your needs. Try these tips:    If you stand for long periods, find ways to do some tasks while sitting. Also, try to stand with 1 foot resting on a low stool or ledge. Shift your weight from foot to foot often. Wear low-heeled shoes.    If you sit, keep your knees level with your hips. Rest your feet on a firm surface. Sit tall with support for your low back.    If you work long hours, ask about adjusting your schedule. Try taking shorter breaks more often.  When you travel  The second trimester may be the best time for any travel. Talk to your healthcare provider about any special plans you may need to make. Always:    Wear a seat belt. Fasten the lap part under your belly. Wear the shoulder part also.    Take breaks often during long trips by car or plane. Move around to stretch your legs.    Drink plenty of fluids on flights. The air in plane cabins is very dry.    Avoid hot climates or high altitudes if you are not used to them.    Avoid places where the food and water might make you sick.    Make sure you are up-to-date on all immunizations, including the flu vaccine. This is especially important when traveling overseas.  Taking time to relax  Find time to rest and relax at work or at home:    Take short time-outs daily. Do relaxation exercises.    Breathe deeply during stressful times.    Try not to take on too much. Plan tasks for times when you have the most energy.    Take naps when you can. Or just sit and relax.    After week 16, avoid lying on your back for more than a few minutes. Instead, lie on your side. Switch sides often.  Continuing as lovers  Unless your healthcare provider tells you otherwise, there is no reason to stop having sex now. Blood supply increases to the pelvic area in the second trimester. Because of this, sex might be more enjoyable. Try different positions and see what s best. Also,  talk to your partner about any changes in desire. Spotting may happen after sex. Be sure to let your healthcare provider know if there is heavy bleeding.  Keeping your environment safe  You can still clean house and use scented products. Just take some simple precautions:    Wear gloves when using cleaning fluids.    Open windows to let in fresh air. Use a fan if you paint.    Avoid secondhand smoke.    Don t breathe fumes from nail polish, hair spray, cleansers, or other chemicals.  [employer] last reviewed this educational content on 1/1/2018 2000-2021 [employer]. All rights reserved. This information is not intended as a substitute for professional medical care. Always follow your healthcare professional's instructions.

## 2022-04-12 ENCOUNTER — PRENATAL OFFICE VISIT (OUTPATIENT)
Dept: OBGYN | Facility: OTHER | Age: 32
End: 2022-04-12
Attending: NURSE PRACTITIONER
Payer: COMMERCIAL

## 2022-04-12 ENCOUNTER — OFFICE VISIT (OUTPATIENT)
Dept: CHIROPRACTIC MEDICINE | Facility: OTHER | Age: 32
End: 2022-04-12
Attending: CHIROPRACTOR
Payer: COMMERCIAL

## 2022-04-12 VITALS
OXYGEN SATURATION: 100 % | HEIGHT: 67 IN | WEIGHT: 293 LBS | DIASTOLIC BLOOD PRESSURE: 73 MMHG | BODY MASS INDEX: 45.99 KG/M2 | SYSTOLIC BLOOD PRESSURE: 106 MMHG | HEART RATE: 93 BPM

## 2022-04-12 DIAGNOSIS — M99.02 SEGMENTAL AND SOMATIC DYSFUNCTION OF THORACIC REGION: ICD-10-CM

## 2022-04-12 DIAGNOSIS — Z34.02 FIRST PREGNANCY, SECOND TRIMESTER: Primary | ICD-10-CM

## 2022-04-12 DIAGNOSIS — M54.50 ACUTE BILATERAL LOW BACK PAIN WITHOUT SCIATICA: ICD-10-CM

## 2022-04-12 DIAGNOSIS — M99.01 SEGMENTAL AND SOMATIC DYSFUNCTION OF CERVICAL REGION: ICD-10-CM

## 2022-04-12 DIAGNOSIS — M99.03 SEGMENTAL AND SOMATIC DYSFUNCTION OF LUMBAR REGION: Primary | ICD-10-CM

## 2022-04-12 DIAGNOSIS — Z34.03 FIRST PREGNANCY, THIRD TRIMESTER: ICD-10-CM

## 2022-04-12 PROCEDURE — 99212 OFFICE O/P EST SF 10 MIN: CPT | Mod: 25 | Performed by: CHIROPRACTOR

## 2022-04-12 PROCEDURE — 98941 CHIROPRACT MANJ 3-4 REGIONS: CPT | Mod: AT | Performed by: CHIROPRACTOR

## 2022-04-12 PROCEDURE — 99207 PR PRENATAL VISIT: CPT | Performed by: NURSE PRACTITIONER

## 2022-04-12 ASSESSMENT — PAIN SCALES - GENERAL: PAINLEVEL: NO PAIN (0)

## 2022-04-12 NOTE — NURSING NOTE
"Chief Complaint   Patient presents with     Prenatal Care     25 weeks 1 day       Initial /73 (BP Location: Right arm, Patient Position: Sitting, Cuff Size: Adult Large)   Pulse 93   Ht 1.702 m (5' 7\")   Wt 133.3 kg (293 lb 12.8 oz)   LMP 10/18/2021   SpO2 100%   BMI 46.02 kg/m   Estimated body mass index is 46.02 kg/m  as calculated from the following:    Height as of this encounter: 1.702 m (5' 7\").    Weight as of this encounter: 133.3 kg (293 lb 12.8 oz).  Medication Reconciliation: complete     Jessika Woody LPN    "

## 2022-04-12 NOTE — PROGRESS NOTES
Doing well.  No cramping or bleeding.  Occasional LUQ pain.  Baby active.  Discussed and ordered 28 week labs.  Plan Tdap and rhogam next visit.  Dependant edema in legs and ankles.  Prevention discussed.  Return in 4 weeks.

## 2022-05-05 ENCOUNTER — PRENATAL OFFICE VISIT (OUTPATIENT)
Dept: OBGYN | Facility: OTHER | Age: 32
End: 2022-05-05
Attending: NURSE PRACTITIONER
Payer: COMMERCIAL

## 2022-05-05 ENCOUNTER — LAB (OUTPATIENT)
Dept: LAB | Facility: OTHER | Age: 32
End: 2022-05-05
Attending: NURSE PRACTITIONER
Payer: COMMERCIAL

## 2022-05-05 VITALS
SYSTOLIC BLOOD PRESSURE: 123 MMHG | HEART RATE: 112 BPM | DIASTOLIC BLOOD PRESSURE: 76 MMHG | OXYGEN SATURATION: 98 % | HEIGHT: 67 IN | BODY MASS INDEX: 45.99 KG/M2 | WEIGHT: 293 LBS

## 2022-05-05 DIAGNOSIS — Z67.91 RH NEGATIVE STATE IN ANTEPARTUM PERIOD, THIRD TRIMESTER: ICD-10-CM

## 2022-05-05 DIAGNOSIS — Z34.03 FIRST PREGNANCY, THIRD TRIMESTER: ICD-10-CM

## 2022-05-05 DIAGNOSIS — O26.893 RH NEGATIVE STATE IN ANTEPARTUM PERIOD, THIRD TRIMESTER: ICD-10-CM

## 2022-05-05 DIAGNOSIS — Z34.03 FIRST PREGNANCY, THIRD TRIMESTER: Primary | ICD-10-CM

## 2022-05-05 LAB
ANTIBODY SCREEN: NEGATIVE
ERYTHROCYTE [DISTWIDTH] IN BLOOD BY AUTOMATED COUNT: 12.8 % (ref 10–15)
GLUCOSE 1H P 50 G GLC PO SERPL-MCNC: 115 MG/DL (ref 70–129)
HCT VFR BLD AUTO: 38.6 % (ref 35–47)
HGB BLD-MCNC: 13.2 G/DL (ref 11.7–15.7)
MCH RBC QN AUTO: 30.9 PG (ref 26.5–33)
MCHC RBC AUTO-ENTMCNC: 34.2 G/DL (ref 31.5–36.5)
MCV RBC AUTO: 90 FL (ref 78–100)
PLATELET # BLD AUTO: 295 10E3/UL (ref 150–450)
RBC # BLD AUTO: 4.27 10E6/UL (ref 3.8–5.2)
SPECIMEN EXPIRATION DATE: NORMAL
WBC # BLD AUTO: 10.2 10E3/UL (ref 4–11)

## 2022-05-05 PROCEDURE — 99207 PR PRENATAL VISIT: CPT | Performed by: NURSE PRACTITIONER

## 2022-05-05 PROCEDURE — 90715 TDAP VACCINE 7 YRS/> IM: CPT | Performed by: NURSE PRACTITIONER

## 2022-05-05 PROCEDURE — 36415 COLL VENOUS BLD VENIPUNCTURE: CPT

## 2022-05-05 PROCEDURE — 96372 THER/PROPH/DIAG INJ SC/IM: CPT | Performed by: NURSE PRACTITIONER

## 2022-05-05 PROCEDURE — 82950 GLUCOSE TEST: CPT

## 2022-05-05 PROCEDURE — 85027 COMPLETE CBC AUTOMATED: CPT

## 2022-05-05 PROCEDURE — 90471 IMMUNIZATION ADMIN: CPT | Performed by: NURSE PRACTITIONER

## 2022-05-05 PROCEDURE — 86780 TREPONEMA PALLIDUM: CPT

## 2022-05-05 PROCEDURE — 86850 RBC ANTIBODY SCREEN: CPT

## 2022-05-05 ASSESSMENT — PAIN SCALES - GENERAL: PAINLEVEL: NO PAIN (0)

## 2022-05-05 NOTE — PROGRESS NOTES
Clinic Administered Medication Documentation    Administrations This Visit     rho(D) immune globulin (RHOGAM) injection 300 mcg     Admin Date  05/05/2022 Action  Given Dose  300 mcg Route  Intramuscular Site   Administered By  Jessika Woody LPN    Ordering Provider: Li Schroeder NP    Patient Supplied?: No                  Injectable Medication Documentation    Patient was given Rhogam. Prior to medication administration, verified patients identity using patient s name and date of birth. Please see MAR and medication order for additional information.       Was entire vial of medication used? Yes  Vial/Syringe: Single dose vial  Expiration Date:  8/27/23  Was this medication supplied by the patient? No    Right Glut    Jessika Woody LPN

## 2022-05-05 NOTE — NURSING NOTE
"Chief Complaint   Patient presents with     Prenatal Care     28 weeks 3 days       Initial /76 (BP Location: Left arm, Cuff Size: Adult Large)   Pulse 112   Ht 1.702 m (5' 7\")   Wt 134.3 kg (296 lb)   LMP 10/18/2021   SpO2 98%   BMI 46.36 kg/m   Estimated body mass index is 46.36 kg/m  as calculated from the following:    Height as of this encounter: 1.702 m (5' 7\").    Weight as of this encounter: 134.3 kg (296 lb).  Medication Reconciliation: complete  Bridget Orozco LPN  "

## 2022-05-05 NOTE — PROGRESS NOTES
Doing well. Denies cramping or VB.  Baby active.  28 week labs, Tdap, and rhogam today.  Discussed third trimester changes and comfort measures.  Kick counts taught.  Return in 2 weeks.

## 2022-05-06 LAB — T PALLIDUM AB SER QL: NONREACTIVE

## 2022-05-12 ENCOUNTER — OFFICE VISIT (OUTPATIENT)
Dept: CHIROPRACTIC MEDICINE | Facility: OTHER | Age: 32
End: 2022-05-12
Attending: CHIROPRACTOR
Payer: COMMERCIAL

## 2022-05-12 DIAGNOSIS — M99.03 SEGMENTAL AND SOMATIC DYSFUNCTION OF LUMBAR REGION: Primary | ICD-10-CM

## 2022-05-12 DIAGNOSIS — M99.02 SEGMENTAL AND SOMATIC DYSFUNCTION OF THORACIC REGION: ICD-10-CM

## 2022-05-12 DIAGNOSIS — M54.50 ACUTE BILATERAL LOW BACK PAIN WITHOUT SCIATICA: ICD-10-CM

## 2022-05-12 PROCEDURE — 98941 CHIROPRACT MANJ 3-4 REGIONS: CPT | Mod: AT | Performed by: CHIROPRACTOR

## 2022-05-17 NOTE — PROGRESS NOTES
Subjective Finding:    Chief compalint: Patient presents with:  Back Pain  , Pain Scale: 7/10, Intensity: sharp, Duration: 3 days, Radiating: no.    Date of injury:     Activities that the pain restricts:   Home/household/hobbies/social activities: yes.  Work duties: yes.  Sleep: yes.  Makes symptoms better: rest.  Makes symptoms worse: lumbar flexion.  Have you seen anyone else for the symptoms? No.  Work related: no.  Automobile related injury: no.    Objective and Assessment:    Posture Analysis:   High shoulder: .  Head tilt: .  High iliac crest: .  Head carriage: neutral.  Thoracic Kyphosis: neutral.  Lumbar Lordosis: forward.    Lumbar Range of Motion: extension decreased.  Cervical Range of Motion: .  Thoracic Range of Motion: .  Extremity Range of Motion: .    Palpation:   Trap pain    Segmental dysfunction pre-treatment and treatment area: T7, L5 and Sacrum.  C56    Assessment post-treatment:  Cervical: .  Thoracic: ROM increased.  Lumbar: ROM increased.    Comments: .      Complicating Factors: .    Procedure(s):  CMT:  90490 Chiropractic manipulative treatment 1-2 regions performed   Thoracic: Diversified, See above for level, Prone and Lumbar: Diversified, See above for level, Side posture    Modalities:  None performed this visit    Therapeutic procedures:  None    Plan:  Treatment plan: PRN.  Instructed patient: stretch as instructed at visit.  Short term goals: increase ROM.  Long term goals: increase ADL.  Prognosis: excellent.

## 2022-05-19 ENCOUNTER — PRENATAL OFFICE VISIT (OUTPATIENT)
Dept: OBGYN | Facility: OTHER | Age: 32
End: 2022-05-19
Attending: NURSE PRACTITIONER
Payer: COMMERCIAL

## 2022-05-19 VITALS
SYSTOLIC BLOOD PRESSURE: 121 MMHG | WEIGHT: 293 LBS | HEART RATE: 101 BPM | DIASTOLIC BLOOD PRESSURE: 72 MMHG | OXYGEN SATURATION: 99 % | HEIGHT: 67 IN | BODY MASS INDEX: 45.99 KG/M2

## 2022-05-19 DIAGNOSIS — E66.01 MORBID OBESITY (H): Primary | ICD-10-CM

## 2022-05-19 DIAGNOSIS — Z34.03 FIRST PREGNANCY, THIRD TRIMESTER: ICD-10-CM

## 2022-05-19 PROCEDURE — 99207 PR PRENATAL VISIT: CPT | Performed by: NURSE PRACTITIONER

## 2022-05-19 ASSESSMENT — PAIN SCALES - GENERAL: PAINLEVEL: NO PAIN (0)

## 2022-05-19 NOTE — PROGRESS NOTES
Doing well.  Denies concerns. No cramping or VB.   28 week labs reviewed.  Third trimester changes, comfort measures, and kick counts discussed. Anesthesia consult and 36 week growth US ordered.  Return in 2 weeks.

## 2022-05-19 NOTE — NURSING NOTE
"Chief Complaint   Patient presents with     Prenatal Care     30 weeks 3 days       Initial /72 (BP Location: Left arm, Patient Position: Sitting, Cuff Size: Adult Large)   Pulse 101   Ht 1.702 m (5' 7\")   Wt 135.9 kg (299 lb 11.2 oz)   LMP 10/18/2021   SpO2 99%   BMI 46.94 kg/m   Estimated body mass index is 46.94 kg/m  as calculated from the following:    Height as of this encounter: 1.702 m (5' 7\").    Weight as of this encounter: 135.9 kg (299 lb 11.2 oz).  Medication Reconciliation: complete     Jessika Woody LPN    "

## 2022-06-03 ENCOUNTER — PRENATAL OFFICE VISIT (OUTPATIENT)
Dept: OBGYN | Facility: OTHER | Age: 32
End: 2022-06-03
Attending: NURSE PRACTITIONER
Payer: COMMERCIAL

## 2022-06-03 VITALS
OXYGEN SATURATION: 100 % | HEIGHT: 67 IN | BODY MASS INDEX: 45.99 KG/M2 | WEIGHT: 293 LBS | SYSTOLIC BLOOD PRESSURE: 120 MMHG | HEART RATE: 109 BPM | DIASTOLIC BLOOD PRESSURE: 67 MMHG

## 2022-06-03 DIAGNOSIS — Z34.03 FIRST PREGNANCY, THIRD TRIMESTER: Primary | ICD-10-CM

## 2022-06-03 PROCEDURE — 99207 PR PRENATAL VISIT: CPT | Performed by: NURSE PRACTITIONER

## 2022-06-03 ASSESSMENT — PAIN SCALES - GENERAL: PAINLEVEL: NO PAIN (0)

## 2022-06-03 NOTE — NURSING NOTE
"Chief Complaint   Patient presents with     Prenatal Care     32 weeks 4 days       Initial /67 (BP Location: Right arm, Patient Position: Sitting, Cuff Size: Adult Large)   Pulse 109   Ht 1.702 m (5' 7\")   Wt 137.2 kg (302 lb 6.4 oz)   LMP 10/18/2021   SpO2 100%   BMI 47.36 kg/m   Estimated body mass index is 47.36 kg/m  as calculated from the following:    Height as of this encounter: 1.702 m (5' 7\").    Weight as of this encounter: 137.2 kg (302 lb 6.4 oz).  Medication Reconciliation: complete     Jessika Woody LPN    "

## 2022-06-03 NOTE — PROGRESS NOTES
Doing well.  Baby active. Denies cramping or VB.  Discussed upcoming anesthesia consult and growth US. Kick counts and signs of PTL discussed.  Return in 2 weeks.

## 2022-06-09 ENCOUNTER — HOSPITAL ENCOUNTER (OUTPATIENT)
Age: 32
End: 2022-06-09
Attending: NURSE ANESTHETIST, CERTIFIED REGISTERED | Admitting: NURSE ANESTHETIST, CERTIFIED REGISTERED
Payer: COMMERCIAL

## 2022-06-14 ENCOUNTER — PRENATAL OFFICE VISIT (OUTPATIENT)
Dept: OBGYN | Facility: OTHER | Age: 32
End: 2022-06-14
Attending: CHIROPRACTOR
Payer: COMMERCIAL

## 2022-06-14 ENCOUNTER — HOSPITAL ENCOUNTER (OUTPATIENT)
Facility: HOSPITAL | Age: 32
Discharge: HOME | End: 2022-06-14
Attending: NURSE ANESTHETIST, CERTIFIED REGISTERED | Admitting: NURSE ANESTHETIST, CERTIFIED REGISTERED
Payer: COMMERCIAL

## 2022-06-14 ENCOUNTER — OFFICE VISIT (OUTPATIENT)
Dept: CHIROPRACTIC MEDICINE | Facility: OTHER | Age: 32
End: 2022-06-14
Attending: CHIROPRACTOR
Payer: COMMERCIAL

## 2022-06-14 VITALS
DIASTOLIC BLOOD PRESSURE: 74 MMHG | HEART RATE: 106 BPM | WEIGHT: 293 LBS | HEIGHT: 67 IN | OXYGEN SATURATION: 98 % | BODY MASS INDEX: 45.99 KG/M2 | SYSTOLIC BLOOD PRESSURE: 117 MMHG

## 2022-06-14 DIAGNOSIS — Z34.03 FIRST PREGNANCY, THIRD TRIMESTER: Primary | ICD-10-CM

## 2022-06-14 DIAGNOSIS — M99.02 SEGMENTAL AND SOMATIC DYSFUNCTION OF THORACIC REGION: ICD-10-CM

## 2022-06-14 DIAGNOSIS — O99.213 OBESITY AFFECTING PREGNANCY IN THIRD TRIMESTER: ICD-10-CM

## 2022-06-14 DIAGNOSIS — M54.2 CERVICALGIA: ICD-10-CM

## 2022-06-14 DIAGNOSIS — E66.01 MORBID OBESITY (H): ICD-10-CM

## 2022-06-14 DIAGNOSIS — M99.01 SEGMENTAL AND SOMATIC DYSFUNCTION OF CERVICAL REGION: Primary | ICD-10-CM

## 2022-06-14 PROCEDURE — 98940 CHIROPRACT MANJ 1-2 REGIONS: CPT | Mod: AT | Performed by: CHIROPRACTOR

## 2022-06-14 PROCEDURE — 99207 PR PRENATAL VISIT: CPT | Performed by: NURSE PRACTITIONER

## 2022-06-14 ASSESSMENT — PAIN SCALES - GENERAL: PAINLEVEL: NO PAIN (0)

## 2022-06-14 NOTE — PROGRESS NOTES
Patient's last weight was >300 lbs with a BMI of 47.33.  Patient is a  at 34 weeks gestation. Hospital anesthesia policy is >45 BMI without prior OB deliveries, should be deferred to another facility. Informed patient of our policy and potential risks and concerns due to excessive weight.  Also informed her if she looses weight she could potentially deliver here with future other pregnancies.

## 2022-06-14 NOTE — PROGRESS NOTES
Doing well.  Denies concerns.  Anesthesia consult today. US next visit.  Denies cramping or VB.  Late pregnancy warning signs reviewed.   Return in 2 weeks.

## 2022-06-14 NOTE — PATIENT INSTRUCTIONS
"BREASTFEEDING TIPS  1. Breastfeed every 2-4 hours. If your baby is sleepy - use breast compression, push on chin to \"start up\" baby, switch breasts, undress to diaper and wake before relatching.   Some babies \"cluster\" feed every 1 hour for a while- this is normal. Feed your baby whenever he/she is awake-  even if every hour for a while. This frequent feeding will help you make more milk and encourage your baby to sleep for longer stretches later in the evening or night.    - Position your baby close to you with pillows so he/she is facing you -belly to belly laying horizontally across your lap at the level of your breast and looking a bit \"upwards\" to your breast   -One hand holds the baby's neck behind the ears and the other hand holds your breast  -Baby's nose should start out pointing to your nipple before latching  - Hold your breast in a \"sandwich\" position by gently squeezing your breast in an oval shape and make sure your hands are not covering the areola  This \"nipple sandwich\" will make it easier for your breast to fit inside the baby's mouth-making latching more comfortable for you and baby and preventing sore nipples. Your baby should take a \"mouthful\" of breast!  - You may want to use hand expression to \"prime the pump\" and get a drip of milk out on your nipple to wake baby   (see website: newborns.[employer].edu/Breastfeeding/HandExpression.html)  - Swipe your nipple on baby's upper lip and wait for a BIG open mouth  - YOU bring baby to the breast (hold baby's neck with your fingers just below the ears) and bring baby's head to the breast--leading with the chin.  Try to avoid pushing your breast into baby's mouth- bring baby to you instead!  - Aim to get your baby's bottom lip LOW DOWN ON AREOLA (baby's upper lip just needs to \"clear\" the nipple) .   Your baby should latch onto the areola and NOT just the nipple. That way your baby gets more milk and you don't get sore nipples!      Useful web " sites:  Www.infantrisk.com  Www.aap.org  Www.ibreastfeeding.com  [email or web address].health.[email or web address]

## 2022-06-14 NOTE — NURSING NOTE
"Chief Complaint   Patient presents with     Prenatal Care     34 weeks 1 day       Initial /74 (BP Location: Left arm, Patient Position: Sitting, Cuff Size: Adult Large)   Pulse 106   Ht 1.702 m (5' 7\")   Wt 137.1 kg (302 lb 3.2 oz)   LMP 10/18/2021   SpO2 98%   BMI 47.33 kg/m   Estimated body mass index is 47.33 kg/m  as calculated from the following:    Height as of this encounter: 1.702 m (5' 7\").    Weight as of this encounter: 137.1 kg (302 lb 3.2 oz).  Medication Reconciliation: complete     Jessika Woody LPN    "

## 2022-06-15 NOTE — PROGRESS NOTES
Subjective Finding:    Chief compalint: Patient presents with:  Neck Pain  , Pain Scale: 7/10, Intensity: sharp, Duration: 3 days, Radiating: no.    Date of injury:     Activities that the pain restricts:   Home/household/hobbies/social activities: yes.  Work duties: yes.  Sleep: yes.  Makes symptoms better: rest.  Makes symptoms worse: lumbar flexion.  Have you seen anyone else for the symptoms? No.  Work related: no.  Automobile related injury: no.    Objective and Assessment:    Posture Analysis:   High shoulder: .  Head tilt: .  High iliac crest: .  Head carriage: neutral.  Thoracic Kyphosis: neutral.  Lumbar Lordosis: forward.    Lumbar Range of Motion: extension decreased.  Cervical Range of Motion: .  Thoracic Range of Motion: .  Extremity Range of Motion: .    Palpation:   Trap pain    Segmental dysfunction pre-treatment and treatment area: T56.  C56    Assessment post-treatment:  Cervical: .  Thoracic: ROM increased.  Lumbar: ROM increased.    Comments: .      Complicating Factors: .    Procedure(s):  CMT:  03456 Chiropractic manipulative treatment 1-2 regions performed   Thoracic: Diversified, See above for level, Prone and Lumbar: Diversified, See above for level, Side posture    Modalities:  None performed this visit    Therapeutic procedures:  None    Plan:  Treatment plan: PRN.  Instructed patient: stretch as instructed at visit.  Short term goals: increase ROM.  Long term goals: increase ADL.  Prognosis: excellent.

## 2022-06-28 ENCOUNTER — PRENATAL OFFICE VISIT (OUTPATIENT)
Dept: OBGYN | Facility: OTHER | Age: 32
End: 2022-06-28
Attending: OBSTETRICS & GYNECOLOGY
Payer: COMMERCIAL

## 2022-06-28 ENCOUNTER — HOSPITAL ENCOUNTER (OUTPATIENT)
Dept: ULTRASOUND IMAGING | Facility: HOSPITAL | Age: 32
Discharge: HOME | End: 2022-06-28
Attending: NURSE PRACTITIONER | Admitting: NURSE PRACTITIONER
Payer: COMMERCIAL

## 2022-06-28 VITALS
SYSTOLIC BLOOD PRESSURE: 118 MMHG | HEART RATE: 80 BPM | WEIGHT: 293 LBS | OXYGEN SATURATION: 98 % | BODY MASS INDEX: 45.99 KG/M2 | DIASTOLIC BLOOD PRESSURE: 72 MMHG | HEIGHT: 67 IN

## 2022-06-28 DIAGNOSIS — O99.213 OBESITY AFFECTING PREGNANCY IN THIRD TRIMESTER: ICD-10-CM

## 2022-06-28 DIAGNOSIS — E66.01 MORBID OBESITY (H): ICD-10-CM

## 2022-06-28 DIAGNOSIS — Z34.03 FIRST PREGNANCY, THIRD TRIMESTER: Primary | ICD-10-CM

## 2022-06-28 DIAGNOSIS — Z34.03 FIRST PREGNANCY, THIRD TRIMESTER: ICD-10-CM

## 2022-06-28 PROCEDURE — 76816 OB US FOLLOW-UP PER FETUS: CPT

## 2022-06-28 PROCEDURE — 99207 PR PRENATAL VISIT: CPT | Performed by: OBSTETRICS & GYNECOLOGY

## 2022-06-28 PROCEDURE — 87653 STREP B DNA AMP PROBE: CPT | Performed by: OBSTETRICS & GYNECOLOGY

## 2022-06-28 ASSESSMENT — PAIN SCALES - GENERAL: PAINLEVEL: NO PAIN (0)

## 2022-06-28 NOTE — NURSING NOTE
"Chief Complaint   Patient presents with     Prenatal Care     36 weeks 1 day       Initial /72 (BP Location: Left arm, Patient Position: Chair, Cuff Size: Adult Large)   Pulse 80   Ht 1.702 m (5' 7\")   Wt 140.6 kg (310 lb)   LMP 10/18/2021   SpO2 98%   BMI 48.55 kg/m   Estimated body mass index is 48.55 kg/m  as calculated from the following:    Height as of this encounter: 1.702 m (5' 7\").    Weight as of this encounter: 140.6 kg (310 lb).  Medication Reconciliation: complete  QUIRINO MORENO LPN    "

## 2022-06-28 NOTE — PROGRESS NOTES
Doing well.  No concerns today.  US nml growth/fluid today.   GBS done  Discussed signs of labor and when to call or come in.  Discussed kick counts and fetal movement.  Biophysical profile ordered weekly starting next week.  Appt Jaspal Thursday for delivery due to maternal obesity.  RTC in 1 week  Denies regular contractions, vaginal bleeding, KIERSTEN Toney MD  6/28/2022

## 2022-06-29 PROBLEM — O99.820 GBS (GROUP B STREPTOCOCCUS CARRIER), +RV CULTURE, CURRENTLY PREGNANT: Status: ACTIVE | Noted: 2022-06-29

## 2022-06-29 LAB — GP B STREP DNA SPEC QL NAA+PROBE: POSITIVE

## 2022-07-07 ENCOUNTER — HOSPITAL ENCOUNTER (OUTPATIENT)
Dept: ULTRASOUND IMAGING | Facility: HOSPITAL | Age: 32
Discharge: HOME | End: 2022-07-07
Attending: OBSTETRICS & GYNECOLOGY | Admitting: OBSTETRICS & GYNECOLOGY
Payer: COMMERCIAL

## 2022-07-07 DIAGNOSIS — O99.213 OBESITY AFFECTING PREGNANCY IN THIRD TRIMESTER: ICD-10-CM

## 2022-07-07 PROCEDURE — 76819 FETAL BIOPHYS PROFIL W/O NST: CPT

## 2022-07-08 ENCOUNTER — PRENATAL OFFICE VISIT (OUTPATIENT)
Dept: OBGYN | Facility: OTHER | Age: 32
End: 2022-07-08
Attending: NURSE PRACTITIONER
Payer: COMMERCIAL

## 2022-07-08 VITALS
OXYGEN SATURATION: 99 % | BODY MASS INDEX: 45.99 KG/M2 | HEART RATE: 99 BPM | DIASTOLIC BLOOD PRESSURE: 74 MMHG | WEIGHT: 293 LBS | SYSTOLIC BLOOD PRESSURE: 121 MMHG | HEIGHT: 67 IN

## 2022-07-08 DIAGNOSIS — Z34.03 FIRST PREGNANCY, THIRD TRIMESTER: Primary | ICD-10-CM

## 2022-07-08 PROCEDURE — 99207 PR PRENATAL VISIT: CPT | Performed by: NURSE PRACTITIONER

## 2022-07-08 ASSESSMENT — PAIN SCALES - GENERAL: PAINLEVEL: NO PAIN (0)

## 2022-07-08 NOTE — PATIENT INSTRUCTIONS
"BREASTFEEDING TIPS  1. Breastfeed every 2-4 hours. If your baby is sleepy - use breast compression, push on chin to \"start up\" baby, switch breasts, undress to diaper and wake before relatching.   Some babies \"cluster\" feed every 1 hour for a while- this is normal. Feed your baby whenever he/she is awake-  even if every hour for a while. This frequent feeding will help you make more milk and encourage your baby to sleep for longer stretches later in the evening or night.    - Position your baby close to you with pillows so he/she is facing you -belly to belly laying horizontally across your lap at the level of your breast and looking a bit \"upwards\" to your breast   -One hand holds the baby's neck behind the ears and the other hand holds your breast  -Baby's nose should start out pointing to your nipple before latching  - Hold your breast in a \"sandwich\" position by gently squeezing your breast in an oval shape and make sure your hands are not covering the areola  This \"nipple sandwich\" will make it easier for your breast to fit inside the baby's mouth-making latching more comfortable for you and baby and preventing sore nipples. Your baby should take a \"mouthful\" of breast!  - You may want to use hand expression to \"prime the pump\" and get a drip of milk out on your nipple to wake baby   (see website: [email or web address].edu/Breastfeeding/HandExpression.html)  - Swipe your nipple on baby's upper lip and wait for a BIG open mouth  - YOU bring baby to the breast (hold baby's neck with your fingers just below the ears) and bring baby's head to the breast--leading with the chin.  Try to avoid pushing your breast into baby's mouth- bring baby to you instead!  - Aim to get your baby's bottom lip LOW DOWN ON AREOLA (baby's upper lip just needs to \"clear\" the nipple) .   Your baby should latch onto the areola and NOT just the nipple. That way your baby gets more milk and you don't get sore nipples!      Useful web " sites:  Www.infantrisk.com  Www.aap.org  Www.ibreastfeeding.com  Www.health.[email or web address].us

## 2022-07-08 NOTE — PROGRESS NOTES
Doing well.  Denies concerns.  No VB, leaking fluid, or hortencia.  Induction scheduled in [place] on 22.  Discussed pumping and initiation of milk supply following delivery.  Scheduled with DAVID Ko for breastfeeding follow up and  weight check on 22.  Reviewed kick counts and comfort measures.  Return in 1 week.

## 2022-07-08 NOTE — NURSING NOTE
"Chief Complaint   Patient presents with     Prenatal Care     37 weeks 4 days       Initial /74 (BP Location: Right arm, Patient Position: Sitting, Cuff Size: Adult Large)   Pulse 99   Ht 1.702 m (5' 7\")   Wt 142.4 kg (313 lb 14.4 oz)   LMP 10/18/2021   SpO2 99%   BMI 49.16 kg/m   Estimated body mass index is 49.16 kg/m  as calculated from the following:    Height as of this encounter: 1.702 m (5' 7\").    Weight as of this encounter: 142.4 kg (313 lb 14.4 oz).  Medication Reconciliation: complete     Jessika Woody LPN    "

## 2022-07-14 ENCOUNTER — PRENATAL OFFICE VISIT (OUTPATIENT)
Dept: OBGYN | Facility: OTHER | Age: 32
End: 2022-07-14
Attending: OBSTETRICS & GYNECOLOGY
Payer: COMMERCIAL

## 2022-07-14 ENCOUNTER — HOSPITAL ENCOUNTER (OUTPATIENT)
Dept: ULTRASOUND IMAGING | Facility: HOSPITAL | Age: 32
Discharge: HOME | End: 2022-07-14
Attending: OBSTETRICS & GYNECOLOGY
Payer: COMMERCIAL

## 2022-07-14 VITALS
BODY MASS INDEX: 45.99 KG/M2 | SYSTOLIC BLOOD PRESSURE: 128 MMHG | DIASTOLIC BLOOD PRESSURE: 82 MMHG | WEIGHT: 293 LBS | HEIGHT: 67 IN

## 2022-07-14 DIAGNOSIS — O99.213 OBESITY AFFECTING PREGNANCY IN THIRD TRIMESTER: ICD-10-CM

## 2022-07-14 DIAGNOSIS — Z34.03 FIRST PREGNANCY, THIRD TRIMESTER: Primary | ICD-10-CM

## 2022-07-14 PROCEDURE — 99207 PR PRENATAL VISIT: CPT | Performed by: NURSE PRACTITIONER

## 2022-07-14 PROCEDURE — 76819 FETAL BIOPHYS PROFIL W/O NST: CPT

## 2022-07-14 ASSESSMENT — PAIN SCALES - GENERAL: PAINLEVEL: NO PAIN (0)

## 2022-07-14 NOTE — PROGRESS NOTES
Doing well.  Baby active.  No cramping or hortencia.  BPP today.  Induction scheduled for 7/22/22 in [place].  Signs of labor reviewed.  Return in 1 week.

## 2022-07-14 NOTE — NURSING NOTE
"Chief Complaint   Patient presents with     Prenatal Care     38 2/7       Initial /82 (BP Location: Left arm, Patient Position: Sitting, Cuff Size: Adult Large)   Ht 1.702 m (5' 7\")   Wt 143.8 kg (317 lb)   LMP 10/18/2021   BMI 49.65 kg/m   Estimated body mass index is 49.65 kg/m  as calculated from the following:    Height as of this encounter: 1.702 m (5' 7\").    Weight as of this encounter: 143.8 kg (317 lb).  Medication Reconciliation: complete  KATHERINE TRACY LPN  "

## 2022-07-20 ENCOUNTER — HOSPITAL ENCOUNTER (OUTPATIENT)
Dept: ULTRASOUND IMAGING | Facility: HOSPITAL | Age: 32
Discharge: HOME | End: 2022-07-20
Attending: NURSE PRACTITIONER | Admitting: NURSE PRACTITIONER
Payer: COMMERCIAL

## 2022-07-20 DIAGNOSIS — Z34.03 FIRST PREGNANCY, THIRD TRIMESTER: ICD-10-CM

## 2022-07-20 DIAGNOSIS — O99.213 OBESITY AFFECTING PREGNANCY IN THIRD TRIMESTER: ICD-10-CM

## 2022-07-20 PROCEDURE — 76819 FETAL BIOPHYS PROFIL W/O NST: CPT

## 2022-07-21 ENCOUNTER — PRENATAL OFFICE VISIT (OUTPATIENT)
Dept: OBGYN | Facility: OTHER | Age: 32
End: 2022-07-21
Attending: NURSE PRACTITIONER
Payer: COMMERCIAL

## 2022-07-21 VITALS
HEART RATE: 105 BPM | WEIGHT: 293 LBS | SYSTOLIC BLOOD PRESSURE: 119 MMHG | BODY MASS INDEX: 45.99 KG/M2 | OXYGEN SATURATION: 98 % | DIASTOLIC BLOOD PRESSURE: 78 MMHG | HEIGHT: 67 IN

## 2022-07-21 DIAGNOSIS — Z34.03 FIRST PREGNANCY, THIRD TRIMESTER: Primary | ICD-10-CM

## 2022-07-21 PROCEDURE — 59426 ANTEPARTUM CARE ONLY: CPT | Performed by: NURSE PRACTITIONER

## 2022-07-21 ASSESSMENT — PAIN SCALES - GENERAL: PAINLEVEL: NO PAIN (0)

## 2022-07-21 NOTE — PATIENT INSTRUCTIONS
"BREASTFEEDING TIPS  1. Breastfeed every 2-4 hours. If your baby is sleepy - use breast compression, push on chin to \"start up\" baby, switch breasts, undress to diaper and wake before relatching.   Some babies \"cluster\" feed every 1 hour for a while- this is normal. Feed your baby whenever he/she is awake-  even if every hour for a while. This frequent feeding will help you make more milk and encourage your baby to sleep for longer stretches later in the evening or night.    - Position your baby close to you with pillows so he/she is facing you -belly to belly laying horizontally across your lap at the level of your breast and looking a bit \"upwards\" to your breast   -One hand holds the baby's neck behind the ears and the other hand holds your breast  -Baby's nose should start out pointing to your nipple before latching  - Hold your breast in a \"sandwich\" position by gently squeezing your breast in an oval shape and make sure your hands are not covering the areola  This \"nipple sandwich\" will make it easier for your breast to fit inside the baby's mouth-making latching more comfortable for you and baby and preventing sore nipples. Your baby should take a \"mouthful\" of breast!  - You may want to use hand expression to \"prime the pump\" and get a drip of milk out on your nipple to wake baby   (see website: [email or web address]/[email or web address]/HandExpression.html)  - Swipe your nipple on baby's upper lip and wait for a BIG open mouth  - YOU bring baby to the breast (hold baby's neck with your fingers just below the ears) and bring baby's head to the breast--leading with the chin.  Try to avoid pushing your breast into baby's mouth- bring baby to you instead!  - Aim to get your baby's bottom lip LOW DOWN ON AREOLA (baby's upper lip just needs to \"clear\" the nipple) .   Your baby should latch onto the areola and NOT just the nipple. That way your baby gets more milk and you don't get sore nipples!      Useful web " sites:  Www.infantrisk.com  Www.aap.org  Www.ibreastfeeding.com  Www.[email or web address].mn.[email or web address]

## 2022-07-21 NOTE — NURSING NOTE
"Chief Complaint   Patient presents with     Prenatal Care     39 weeks 3 days       Initial /78 (BP Location: Right arm, Patient Position: Sitting, Cuff Size: Adult Large)   Pulse 105   Ht 1.702 m (5' 7\")   Wt 141.6 kg (312 lb 1.6 oz)   LMP 10/18/2021   SpO2 98%   BMI 48.88 kg/m   Estimated body mass index is 48.88 kg/m  as calculated from the following:    Height as of this encounter: 1.702 m (5' 7\").    Weight as of this encounter: 141.6 kg (312 lb 1.6 oz).  Medication Reconciliation: complete     Jessika Woody LPN    "

## 2022-07-21 NOTE — PROGRESS NOTES
Doing well.  Denies any cramping hortencia, VB, or discharge.  Reactive BPP yesterday.  Baby active.  Cervix very posterior and FT. Planning cervical ripening tonight and induction tomorrow at [facility].  Discussed postpartum follow up.

## 2022-08-09 ENCOUNTER — PRENATAL OFFICE VISIT (OUTPATIENT)
Dept: OBGYN | Facility: OTHER | Age: 32
End: 2022-08-09
Attending: NURSE PRACTITIONER
Payer: COMMERCIAL

## 2022-08-09 ENCOUNTER — TRANSFERRED RECORDS (OUTPATIENT)
Dept: HEALTH INFORMATION MANAGEMENT | Facility: CLINIC | Age: 32
End: 2022-08-09

## 2022-08-09 VITALS
DIASTOLIC BLOOD PRESSURE: 64 MMHG | SYSTOLIC BLOOD PRESSURE: 119 MMHG | BODY MASS INDEX: 43.92 KG/M2 | TEMPERATURE: 97.8 F | OXYGEN SATURATION: 99 % | WEIGHT: 279.8 LBS | HEIGHT: 67 IN | HEART RATE: 78 BPM

## 2022-08-09 DIAGNOSIS — R10.11 RIGHT UPPER QUADRANT PAIN: Primary | ICD-10-CM

## 2022-08-09 DIAGNOSIS — K82.8 ENLARGED GALLBLADDER: ICD-10-CM

## 2022-08-09 DIAGNOSIS — Z98.891 S/P CESAREAN SECTION: ICD-10-CM

## 2022-08-09 LAB
ALBUMIN SERPL-MCNC: 3.1 G/DL (ref 3.4–5)
ALBUMIN UR-MCNC: 30 MG/DL
ALP SERPL-CCNC: 155 U/L (ref 40–150)
ALT SERPL W P-5'-P-CCNC: 49 U/L (ref 0–50)
ANION GAP SERPL CALCULATED.3IONS-SCNC: 5 MMOL/L (ref 3–14)
APPEARANCE UR: CLEAR
AST SERPL W P-5'-P-CCNC: 39 U/L (ref 0–45)
BILIRUB SERPL-MCNC: 0.7 MG/DL (ref 0.2–1.3)
BILIRUB UR QL STRIP: NEGATIVE
BUN SERPL-MCNC: 8 MG/DL (ref 7–30)
CALCIUM SERPL-MCNC: 8.7 MG/DL (ref 8.5–10.1)
CHLORIDE BLD-SCNC: 106 MMOL/L (ref 94–109)
CO2 SERPL-SCNC: 29 MMOL/L (ref 20–32)
COLOR UR AUTO: YELLOW
CREAT SERPL-MCNC: 0.58 MG/DL (ref 0.52–1.04)
ERYTHROCYTE [DISTWIDTH] IN BLOOD BY AUTOMATED COUNT: 11.9 % (ref 10–15)
GFR SERPL CREATININE-BSD FRML MDRD: >90 ML/MIN/1.73M2
GLUCOSE BLD-MCNC: 94 MG/DL (ref 70–99)
GLUCOSE UR STRIP-MCNC: NEGATIVE MG/DL
HCT VFR BLD AUTO: 40.8 % (ref 35–47)
HGB BLD-MCNC: 13.6 G/DL (ref 11.7–15.7)
HGB UR QL STRIP: ABNORMAL
KETONES UR STRIP-MCNC: NEGATIVE MG/DL
LEUKOCYTE ESTERASE UR QL STRIP: ABNORMAL
MCH RBC QN AUTO: 29.7 PG (ref 26.5–33)
MCHC RBC AUTO-ENTMCNC: 33.3 G/DL (ref 31.5–36.5)
MCV RBC AUTO: 89 FL (ref 78–100)
MUCOUS THREADS #/AREA URNS LPF: PRESENT /LPF
NITRATE UR QL: NEGATIVE
PH UR STRIP: 6.5 [PH] (ref 4.7–8)
PLATELET # BLD AUTO: 380 10E3/UL (ref 150–450)
POTASSIUM BLD-SCNC: 3.6 MMOL/L (ref 3.4–5.3)
PROT SERPL-MCNC: 7.4 G/DL (ref 6.8–8.8)
RBC # BLD AUTO: 4.58 10E6/UL (ref 3.8–5.2)
RBC URINE: 1 /HPF
SODIUM SERPL-SCNC: 140 MMOL/L (ref 133–144)
SP GR UR STRIP: 1.03 (ref 1–1.03)
SQUAMOUS EPITHELIAL: 3 /HPF
UROBILINOGEN UR STRIP-MCNC: NORMAL MG/DL
WBC # BLD AUTO: 7.1 10E3/UL (ref 4–11)
WBC URINE: 21 /HPF

## 2022-08-09 PROCEDURE — 81001 URINALYSIS AUTO W/SCOPE: CPT | Performed by: NURSE PRACTITIONER

## 2022-08-09 PROCEDURE — 83789 MASS SPECTROMETRY QUAL/QUAN: CPT | Mod: 90 | Performed by: NURSE PRACTITIONER

## 2022-08-09 PROCEDURE — 87086 URINE CULTURE/COLONY COUNT: CPT | Performed by: NURSE PRACTITIONER

## 2022-08-09 PROCEDURE — 85027 COMPLETE CBC AUTOMATED: CPT | Performed by: NURSE PRACTITIONER

## 2022-08-09 PROCEDURE — 99207 PR NO CHARGE LOS: CPT | Performed by: NURSE PRACTITIONER

## 2022-08-09 PROCEDURE — 80053 COMPREHEN METABOLIC PANEL: CPT | Performed by: NURSE PRACTITIONER

## 2022-08-09 PROCEDURE — 36415 COLL VENOUS BLD VENIPUNCTURE: CPT | Performed by: NURSE PRACTITIONER

## 2022-08-09 PROCEDURE — 99213 OFFICE O/P EST LOW 20 MIN: CPT | Mod: 24 | Performed by: NURSE PRACTITIONER

## 2022-08-09 ASSESSMENT — PAIN SCALES - GENERAL: PAINLEVEL: SEVERE PAIN (6)

## 2022-08-09 NOTE — NURSING NOTE
"Chief Complaint   Patient presents with     Post Partum Exam     2 weeks        Initial /64 (BP Location: Left arm, Patient Position: Sitting, Cuff Size: Adult Large)   Pulse 78   Temp 97.8  F (36.6  C) (Tympanic)   Ht 1.702 m (5' 7\")   Wt 126.9 kg (279 lb 12.8 oz)   LMP 10/18/2021   SpO2 99%   BMI 43.82 kg/m   Estimated body mass index is 43.82 kg/m  as calculated from the following:    Height as of this encounter: 1.702 m (5' 7\").    Weight as of this encounter: 126.9 kg (279 lb 12.8 oz).  Medication Reconciliation: complete     Jessika Woody, JENNI    "

## 2022-08-11 ENCOUNTER — OFFICE VISIT (OUTPATIENT)
Dept: SURGERY | Facility: OTHER | Age: 32
End: 2022-08-11
Attending: NURSE PRACTITIONER
Payer: COMMERCIAL

## 2022-08-11 VITALS
SYSTOLIC BLOOD PRESSURE: 116 MMHG | BODY MASS INDEX: 43.79 KG/M2 | HEART RATE: 88 BPM | HEIGHT: 67 IN | OXYGEN SATURATION: 98 % | DIASTOLIC BLOOD PRESSURE: 62 MMHG | WEIGHT: 279 LBS | TEMPERATURE: 96.9 F

## 2022-08-11 DIAGNOSIS — R10.11 RIGHT UPPER QUADRANT PAIN: ICD-10-CM

## 2022-08-11 DIAGNOSIS — K83.8 COMMON BILE DUCT DILATION: Primary | ICD-10-CM

## 2022-08-11 DIAGNOSIS — K82.8 ENLARGED GALLBLADDER: ICD-10-CM

## 2022-08-11 LAB — BACTERIA UR CULT: NORMAL

## 2022-08-11 PROCEDURE — 99204 OFFICE O/P NEW MOD 45 MIN: CPT | Performed by: SURGERY

## 2022-08-11 ASSESSMENT — PAIN SCALES - GENERAL: PAINLEVEL: NO PAIN (0)

## 2022-08-11 NOTE — PROGRESS NOTES
"SUBJECTIVE:  Destini Tee is a 32 year old female P1 here for a 2 week postpartum visit.  She had a  Section  on 22 at [facility] delivering a healthy baby girl weighing 7 lbs 5 oz at term.  She notes decreasing lochia and denies clots.  She does note concerns of mid back pain beginning last night after dinner with chills and vomiting. Pain is increased with eating.  Pain is now radiating into RUQ and she is vomiting this morning as well.  She denies concerns of changes in bowel or bladder function.  Afebrile.  No headache, vision changes, or tinnitus. Breastfeeding is going well and she has no trauma or engorgement.     Today's Depression Rating was 0    delivery complications:  No  breast feeding:  Yes, denies concerns  bladder problems:  No  bowel problems/hemorrhoids:  No  episiotomy/laceration/incision healed? Yes: incision CDI  vaginal flow:  Light and without clots  Teresita:  No      OBJECTIVE:  Blood pressure 119/64, pulse 78, temperature 97.8  F (36.6  C), temperature source Tympanic, height 1.702 m (5' 7\"), weight 126.9 kg (279 lb 12.8 oz), last menstrual period 10/18/2021, SpO2 99 %, unknown if currently breastfeeding.   General - pleasant female, pale and mildly anxious / painful.  Abdomen - soft, RUQ tenderness to palpation, non distended. Incision CDI.  Back - without CVA tenderness    ASSESSMENT:  RUQ pain - AMP, CBC, Bile salts, US, abdominal US. Low fat diet.  Consider general surgery consult.    S/P  - continue with post op restrictions.  Return in 4 weeks as scheduled or sooner if problems or concerns arise.          "

## 2022-08-11 NOTE — PATIENT INSTRUCTIONS
"BREASTFEEDING TIPS  1. Breastfeed every 2-4 hours. If your baby is sleepy - use breast compression, push on chin to \"start up\" baby, switch breasts, undress to diaper and wake before relatching.   Some babies \"cluster\" feed every 1 hour for a while- this is normal. Feed your baby whenever he/she is awake-  even if every hour for a while. This frequent feeding will help you make more milk and encourage your baby to sleep for longer stretches later in the evening or night.    - Position your baby close to you with pillows so he/she is facing you -belly to belly laying horizontally across your lap at the level of your breast and looking a bit \"upwards\" to your breast   -One hand holds the baby's neck behind the ears and the other hand holds your breast  -Baby's nose should start out pointing to your nipple before latching  - Hold your breast in a \"sandwich\" position by gently squeezing your breast in an oval shape and make sure your hands are not covering the areola  This \"nipple sandwich\" will make it easier for your breast to fit inside the baby's mouth-making latching more comfortable for you and baby and preventing sore nipples. Your baby should take a \"mouthful\" of breast!  - You may want to use hand expression to \"prime the pump\" and get a drip of milk out on your nipple to wake baby   (see website: [email or web address]/HandExpression.html)  - Swipe your nipple on baby's upper lip and wait for a BIG open mouth  - YOU bring baby to the breast (hold baby's neck with your fingers just below the ears) and bring baby's head to the breast--leading with the chin.  Try to avoid pushing your breast into baby's mouth- bring baby to you instead!  - Aim to get your baby's bottom lip LOW DOWN ON AREOLA (baby's upper lip just needs to \"clear\" the nipple) .   Your baby should latch onto the areola and NOT just the nipple. That way your baby gets more milk and you don't get sore nipples!      Useful web " sites:  Www.infantrisk.com  Www.aap.org  Www.ibreastfeeding.com  [email or web address]

## 2022-08-11 NOTE — NURSING NOTE
"Chief Complaint   Patient presents with     Consult     Right upper quadrant pain, Enlarged gallbladder- no pain if eating no fat foods       Initial /62   Pulse 88   Temp 96.9  F (36.1  C)   Ht 1.702 m (5' 7\")   Wt 126.6 kg (279 lb)   LMP 10/18/2021   SpO2 98%   BMI 43.70 kg/m   Estimated body mass index is 43.7 kg/m  as calculated from the following:    Height as of this encounter: 1.702 m (5' 7\").    Weight as of this encounter: 126.6 kg (279 lb).  Medication Reconciliation: complete  Isabelle Garcia LPN  "

## 2022-08-11 NOTE — PATIENT INSTRUCTIONS
Thank you for allowing Dr. Francisco and our surgical team to participate in your care. Please call our health unit coordinator at [phone] with scheduling questions or the nurse at [phone] with any other questions or concerns.     Once we get the results of your MRCP, we will call you to schedule your procedure if needed.

## 2022-08-12 LAB
BILE AC SERPL-SCNC: 6 UMOL/L
CDCAE SERPL-SCNC: 2 UMOL/L
CHOLATE SERPL-SCNC: 2.8 UMOL/L
DO-CHOLATE SERPL-SCNC: 0.9 UMOL/L
URSODEOXYCHOLATE SERPL-SCNC: 0.3 UMOL/L

## 2022-08-15 NOTE — PROGRESS NOTES
"[facility] Surgery Consultation    CC:  Upper abdominal pain     HPI:  This 32 year old year old female is seen at the request of Li Schroeder for evaluation of upper abdominal pain. She recently delivered aprox 2 weeks ago by c- section. She was seen in follow up by OB when she admitted to significant RUQ pain and epigastric pain. She was found to have a dilated gallbladder with resumed stones. She is not in any pain today. She has not had prior surgery other than her  which she had on . She has been staying on a low fat diet. She denies fevers.     History reviewed. No pertinent past medical history.    Past Surgical History:   Procedure Laterality Date      SECTION  2022       Allergies   Allergen Reactions     No Known Drug Allergy        Current Outpatient Medications   Medication     aspirin 81 MG EC tablet     Prenatal Vit-Fe Fumarate-FA (PRENATAL VITAMIN PO)     No current facility-administered medications for this visit.       HABITS:    Social History     Tobacco Use     Smoking status: Never Smoker     Smokeless tobacco: Never Used   Substance Use Topics     Alcohol use: Yes     Alcohol/week: 0.8 standard drinks     Types: 1 Standard drinks or equivalent per week     Comment: occasional     Drug use: No       Family History   Problem Relation Age of Onset     Celiac Disease Mother      Cancer Maternal Grandmother      Cancer Maternal Grandfather         liver     Heart Disease Paternal Grandmother      Heart Disease Paternal Grandfather      Breast Cancer No family hx of      Colon Cancer No family hx of        REVIEW OF SYSTEMS:  Ten point review of systems negative except those mentioned in the HPI.     OBJECTIVE:    /62   Pulse 88   Temp 96.9  F (36.1  C)   Ht 1.702 m (5' 7\")   Wt 126.6 kg (279 lb)   LMP 10/18/2021   SpO2 98%   BMI 43.70 kg/m      GENERAL: Generally appears well, in no distress with appropriate affect.  HEENT:   Sclerae anicteric - " normocephalic atraumatic   Respiratory:  No acute distress, no splinting   Cardiovascular:  Regular Rate and Rhythm  Abdomen: obese.   :  deferred  Extremities:  Extremities normal. No deformities, edema, or skin discoloration.  Skin:  no suspicious lesions or rashes  Neurological: grossly intact  Psych:  Alert, oriented, affect appropriate with normal decision making ability.    IMPRESSION:    32 y.o. female recently post partum presents with what appears to be biliary colic. She did have her ultrasound done at an outside facility but did mention a dilated common bile duct. It is possible she rolled a stone though as she is asymptomatic currently would like to too MRCP prior to removal of her gallbladder. Discussed the risks and benefits of surgery. Did discuss that it is possible she may go many months and not have another attack or she could have another attack soon difficult to predict. They would like to proceed with surgery once MRI is complete.     PLAN:      MRCP   Lap Cholecystectomy pending results vs ERCP.     Amauri Francisco MD,     8/15/2022  11:21 AM

## 2022-08-16 ENCOUNTER — HOSPITAL ENCOUNTER (OUTPATIENT)
Dept: MRI IMAGING | Facility: HOSPITAL | Age: 32
Discharge: HOME | End: 2022-08-16
Attending: SURGERY | Admitting: SURGERY
Payer: COMMERCIAL

## 2022-08-16 DIAGNOSIS — K83.8 COMMON BILE DUCT DILATION: ICD-10-CM

## 2022-08-16 PROCEDURE — 74181 MRI ABDOMEN W/O CONTRAST: CPT

## 2022-08-17 ENCOUNTER — PREP FOR PROCEDURE (OUTPATIENT)
Dept: SURGERY | Facility: OTHER | Age: 32
End: 2022-08-17

## 2022-08-17 DIAGNOSIS — K80.50 CALCULUS OF BILE DUCT WITHOUT CHOLECYSTITIS AND WITHOUT OBSTRUCTION: Primary | ICD-10-CM

## 2022-08-17 DIAGNOSIS — Z01.818 PREOP TESTING: Primary | ICD-10-CM

## 2022-08-29 ENCOUNTER — ANESTHESIA EVENT (OUTPATIENT)
Dept: SURGERY | Facility: HOSPITAL | Age: 32
End: 2022-08-29
Payer: COMMERCIAL

## 2022-08-29 NOTE — ANESTHESIA PREPROCEDURE EVALUATION
Anesthesia Pre-Procedure Evaluation    Patient: Destini Tee   MRN: [id number] : 1990        Procedure : Procedure(s):  CHOLECYSTECTOMY, LAPAROSCOPIC          No past medical history on file.   Past Surgical History:   Procedure Laterality Date      SECTION  2022      Allergies   Allergen Reactions     No Known Drug Allergy       Social History     Tobacco Use     Smoking status: Never Smoker     Smokeless tobacco: Never Used   Substance Use Topics     Alcohol use: Yes     Alcohol/week: 0.8 standard drinks     Types: 1 Standard drinks or equivalent per week     Comment: occasional      Wt Readings from Last 1 Encounters:   22 126.6 kg (279 lb)        Anesthesia Evaluation   Pt has had prior anesthetic. Type: Regional.    No history of anesthetic complications       ROS/MED HX  ENT/Pulmonary:     (+) SHERRY risk factors, obese,     Neurologic:       Cardiovascular:  - neg cardiovascular ROS     METS/Exercise Tolerance: >4 METS    Hematologic:  - neg hematologic  ROS     Musculoskeletal:  - neg musculoskeletal ROS     GI/Hepatic:     (+) cholecystitis/cholelithiasis,     Renal/Genitourinary:  - neg Renal ROS     Endo:     (+) Obesity,     Psychiatric/Substance Use:  - neg psychiatric ROS     Infectious Disease:       Malignancy:  - neg malignancy ROS     Other:  - neg other ROS   (-) Any chance pregnant       Physical Exam    Airway        Mallampati: II   TM distance: > 3 FB   Neck ROM: full   Mouth opening: > 3 cm    Respiratory Devices and Support         Dental  no notable dental history         Cardiovascular   cardiovascular exam normal       Rhythm and rate: regular and normal     Pulmonary   pulmonary exam normal        breath sounds clear to auscultation           OUTSIDE LABS:  CBC:   Lab Results   Component Value Date    WBC 7.1 2022    WBC 10.2 2022    HGB 13.6 2022    HGB 13.2 2022    HCT 40.8 2022    HCT 38.6 2022     2022      05/05/2022     BMP:   Lab Results   Component Value Date     08/09/2022     11/05/2021    POTASSIUM 3.6 08/09/2022    POTASSIUM 3.1 (L) 11/05/2021    CHLORIDE 106 08/09/2022    CHLORIDE 104 11/05/2021    CO2 29 08/09/2022    CO2 27 11/05/2021    BUN 8 08/09/2022    BUN 8 11/05/2021    CR 0.58 08/09/2022    CR 0.68 11/05/2021    GLC 94 08/09/2022    GLC 75 11/05/2021     COAGS: No results found for: PTT, INR, FIBR  POC:   Lab Results   Component Value Date    HCG Negative 05/01/2019     HEPATIC:   Lab Results   Component Value Date    ALBUMIN 3.1 (L) 08/09/2022    PROTTOTAL 7.4 08/09/2022    ALT 49 08/09/2022    AST 39 08/09/2022    ALKPHOS 155 (H) 08/09/2022    BILITOTAL 0.7 08/09/2022     OTHER:   Lab Results   Component Value Date    A1C 5.1 03/19/2015    YOHANNES 8.7 08/09/2022    TSH 1.92 11/05/2021       Anesthesia Plan    ASA Status:  3   NPO Status:  NPO Appropriate    Anesthesia Type: General.     - Airway: ETT   Induction: Intravenous, Propofol.   Maintenance: Balanced.        Consents    Anesthesia Plan(s) and associated risks, benefits, and realistic alternatives discussed. Questions answered and patient/representative(s) expressed understanding.     - Discussed: Risks, Benefits and Alternatives for BOTH SEDATION and the PROCEDURE were discussed     - Discussed with:  Patient      - Extended Intubation/Ventilatory Support Discussed: No.      - Patient is DNR/DNI Status: No    Use of blood products discussed: No .     Postoperative Care    Pain management: IV analgesics.   PONV prophylaxis: Ondansetron (or other 5HT-3), Dexamethasone or Solumedrol     Comments:                ROSITA Chicas CRNA

## 2022-08-31 NOTE — PROGRESS NOTES
[facility] [place]  [address]  [place] [address]  Phone: [phone]  Primary Provider: Mona Reid  Pre-op Performing Provider: HEMANTH WARD      PREOPERATIVE EVALUATION:  Today's date: 9/2/2022    Destini Tee is a 32 year old female who presents for a preoperative evaluation.    Surgical Information:  Surgery/Procedure: Lap Jaky  [place] [facility] [place]  Surgeon: Dr. Francisco  Surgery Date: 9/6/22  Time of Surgery: TBD  Where patient plans to recover: At home with family  Fax number for surgical facility: Note does not need to be faxed, will be available electronically in Epic.    Type of Anesthesia Anticipated: to be determined    Assessment & Plan     The proposed surgical procedure is considered INTERMEDIATE risk.    Preop general physical exam  Enlarged gallbladder  Cleared for procedure   - CBC with platelets and differential; Future  - Basic metabolic panel; Future  - HCG Qual, Urine ([id number]); Future         Risks and Recommendations:  The patient has the following additional risks and recommendations for perioperative complications:   - Morbid obesity (BMI >40)    Medication Instructions:  Patient is on no chronic medications    RECOMMENDATION:  APPROVAL GIVEN to proceed with proposed procedure, without further diagnostic evaluation.            Subjective     HPI related to upcoming procedure: RUQ pain - controlled with low fat diet       Preop Questions 9/2/2022   1. Have you ever had a heart attack or stroke? No   2. Have you ever had surgery on your heart or blood vessels, such as a stent placement, a coronary artery bypass, or surgery on an artery in your head, neck, heart, or legs? No   3. Do you have chest pain with activity? No   4. Do you have a history of  heart failure? No   5. Do you currently have a cold, bronchitis or symptoms of other infection? No   6. Do you have a cough, shortness of breath, or wheezing? No   7. Do you or anyone in your family have  previous history of blood clots? No   8. Do you or does anyone in your family have a serious bleeding problem such as prolonged bleeding following surgeries or cuts? No   9. Have you ever had problems with anemia or been told to take iron pills? No   10. Have you had any abnormal blood loss such as black, tarry or bloody stools, or abnormal vaginal bleeding? No   11. Have you ever had a blood transfusion? No   12. Are you willing to have a blood transfusion if it is medically needed before, during, or after your surgery? Yes   13. Have you or any of your relatives ever had problems with anesthesia? No   14. Do you have sleep apnea, excessive snoring or daytime drowsiness? No   15. Do you have any artifical heart valves or other implanted medical devices like a pacemaker, defibrillator, or continuous glucose monitor? No   16. Do you have artificial joints? No   17. Are you allergic to latex? No   18. Is there any chance that you may be pregnant? No - no current procedure      Health Care Directive:  Patient does not have a Health Care Directive or Living Will: Discussed advance care planning with patient; however, patient declined at this time.    Preoperative Review of :   reviewed - controlled substances prescribed by other outside provider(s).      Status of Chronic Conditions:  See problem list for active medical problems.  Problems all longstanding and stable, except as noted/documented.  See ROS for pertinent symptoms related to these conditions.    Recently gave birth by  healing without any problems     Review of Systems  CONSTITUTIONAL: NEGATIVE for fever, chills, change in weight  INTEGUMENTARY/SKIN: NEGATIVE for worrisome rashes, moles or lesions  EYES: NEGATIVE for vision changes or irritation  ENT/MOUTH: NEGATIVE for ear, mouth and throat problems  RESP: NEGATIVE for significant cough or SOB  CV: NEGATIVE for chest pain, palpitations or peripheral edema  GI: NEGATIVE for nausea, abdominal  pain, heartburn, or change in bowel habits  : NEGATIVE for frequency, dysuria, or hematuria  MUSCULOSKELETAL: NEGATIVE for significant arthralgias or myalgia  NEURO: NEGATIVE for weakness, dizziness or paresthesias  ENDOCRINE: NEGATIVE for temperature intolerance, skin/hair changes  HEME: NEGATIVE for bleeding problems  PSYCHIATRIC: NEGATIVE for changes in mood or affect    Patient Active Problem List    Diagnosis Date Noted     GBS (group B Streptococcus carrier), +RV culture, currently pregnant 2022     Priority: Medium     Obesity affecting pregnancy in third trimester 2022     Priority: Medium     Anesthesia recommending delivery in [place] due to high BMI - referral placed.       First pregnancy, first trimester 01/10/2022     Priority: Medium     No Covid Infection  Vaccinated and Boosted against Covid  Flu Done  81 mg ASA  anesthesia consult  NIPT - Girl       Rubella non-immune status, antepartum 2021     Priority: Medium     Rh negative state in antepartum period 2021     Priority: Medium     Abnormal Pap smear of cervix-cytology negative, HPV other positive, repeat cotesting in 10/2021 10/12/2020     Priority: Medium     Morbid obesity (H) 2019     Priority: Medium      No past medical history on file.  Past Surgical History:   Procedure Laterality Date      SECTION  2022     Current Outpatient Medications   Medication Sig Dispense Refill     Prenatal Vit-Fe Fumarate-FA (PRENATAL VITAMIN PO) Take 1 tablet by mouth daily         Allergies   Allergen Reactions     No Known Drug Allergy         Social History     Tobacco Use     Smoking status: Never Smoker     Smokeless tobacco: Never Used   Substance Use Topics     Alcohol use: Yes     Alcohol/week: 0.8 standard drinks     Types: 1 Standard drinks or equivalent per week     Comment: occasional     Family History   Problem Relation Age of Onset     Celiac Disease Mother      Cancer Maternal Grandmother      Cancer  "Maternal Grandfather         liver     Heart Disease Paternal Grandmother      Heart Disease Paternal Grandfather      Breast Cancer No family hx of      Colon Cancer No family hx of      History   Drug Use No         Objective     /60   Pulse 88   Temp 97.7  F (36.5  C) (Tympanic)   Ht 1.702 m (5' 7\")   Wt 124.7 kg (275 lb)   LMP 10/18/2021   SpO2 98%   BMI 43.07 kg/m      Physical Exam    GENERAL APPEARANCE: healthy, alert and no distress     EYES: EOMI, PERRL     HENT: ear canals and TM's normal and nose and mouth without ulcers or lesions     NECK: no adenopathy, no asymmetry, masses, or scars and thyroid normal to palpation     RESP: lungs clear to auscultation - no rales, rhonchi or wheezes     CV: regular rates and rhythm, normal S1 S2, no S3 or S4 and no murmur, click or rub     ABDOMEN:  soft, nontender, no HSM or masses and bowel sounds normal     MS: extremities normal- no gross deformities noted, no evidence of inflammation in joints, FROM in all extremities.     SKIN: no suspicious lesions or rashes     NEURO: Normal strength and tone, sensory exam grossly normal, mentation intact and speech normal     PSYCH: mentation appears normal. and affect normal/bright     LYMPHATICS: No cervical adenopathy    Recent Labs   Lab Test 08/09/22  1205 05/05/22  1120 12/10/21  1540 11/05/21  1513   HGB 13.6 13.2   < > 13.9    295   < > 320     --   --  137   POTASSIUM 3.6  --   --  3.1*   CR 0.58  --   --  0.68    < > = values in this interval not displayed.        Diagnostics:  Recent Results (from the past 24 hour(s))   Basic metabolic panel    Collection Time: 09/02/22 10:58 AM   Result Value Ref Range    Sodium 138 133 - 144 mmol/L    Potassium 3.7 3.4 - 5.3 mmol/L    Chloride 107 94 - 109 mmol/L    Carbon Dioxide (CO2) 30 20 - 32 mmol/L    Anion Gap 1 (L) 3 - 14 mmol/L    Urea Nitrogen 6 (L) 7 - 30 mg/dL    Creatinine 0.70 0.52 - 1.04 mg/dL    Calcium 8.9 8.5 - 10.1 mg/dL    Glucose 77 " 70 - 99 mg/dL    GFR Estimate >90 >60 mL/min/1.73m2   CBC with platelets and differential    Collection Time: 09/02/22 10:58 AM   Result Value Ref Range    WBC Count 5.3 4.0 - 11.0 10e3/uL    RBC Count 4.54 3.80 - 5.20 10e6/uL    Hemoglobin 13.5 11.7 - 15.7 g/dL    Hematocrit 41.2 35.0 - 47.0 %    MCV 91 78 - 100 fL    MCH 29.7 26.5 - 33.0 pg    MCHC 32.8 31.5 - 36.5 g/dL    RDW 12.5 10.0 - 15.0 %    Platelet Count 281 150 - 450 10e3/uL    % Neutrophils 60 %    % Lymphocytes 30 %    % Monocytes 7 %    % Eosinophils 2 %    % Basophils 1 %    % Immature Granulocytes 0 %    NRBCs per 100 WBC 0 <1 /100    Absolute Neutrophils 3.1 1.6 - 8.3 10e3/uL    Absolute Lymphocytes 1.6 0.8 - 5.3 10e3/uL    Absolute Monocytes 0.4 0.0 - 1.3 10e3/uL    Absolute Eosinophils 0.1 0.0 - 0.7 10e3/uL    Absolute Basophils 0.0 0.0 - 0.2 10e3/uL    Absolute Immature Granulocytes 0.0 <=0.4 10e3/uL    Absolute NRBCs 0.0 10e3/uL      No EKG required, no history of coronary heart disease, significant arrhythmia, peripheral arterial disease or other structural heart disease.    Revised Cardiac Risk Index (RCRI):  The patient has the following serious cardiovascular risks for perioperative complications:   - No serious cardiac risks = 0 points     RCRI Interpretation: 1 point: Class II (low risk - 0.9% complication rate)           Signed Electronically by: ROSITA Ferreira CNP  Copy of this evaluation report is provided to requesting physician.

## 2022-08-31 NOTE — H&P (VIEW-ONLY)
[facility] - [place]  [address]  [place] [address]  Phone: [phone]  Primary Provider: Mona Reid  Pre-op Performing Provider: HEMANTH WARD      PREOPERATIVE EVALUATION:  Today's date: 9/2/2022    Destini Tee is a 32 year old female who presents for a preoperative evaluation.    Surgical Information:  Surgery/Procedure: Lap Jaky  [place] [facility] [place]: Dr. Francisco  Surgery Date: 9/6/22  Time of Surgery: TBD  Where patient plans to recover: At home with family  Fax number for surgical facility: Note does not need to be faxed, will be available electronically in Epic.    Type of Anesthesia Anticipated: to be determined    Assessment & Plan     The proposed surgical procedure is considered INTERMEDIATE risk.    Preop general physical exam  Enlarged gallbladder  Cleared for procedure   - CBC with platelets and differential; Future  - Basic metabolic panel; Future  - HCG Qual, Urine ([id number]); Future         Risks and Recommendations:  The patient has the following additional risks and recommendations for perioperative complications:   - Morbid obesity (BMI >40)    Medication Instructions:  Patient is on no chronic medications    RECOMMENDATION:  APPROVAL GIVEN to proceed with proposed procedure, without further diagnostic evaluation.            Subjective     HPI related to upcoming procedure: RUQ pain - controlled with low fat diet       Preop Questions 9/2/2022   1. Have you ever had a heart attack or stroke? No   2. Have you ever had surgery on your heart or blood vessels, such as a stent placement, a coronary artery bypass, or surgery on an artery in your head, neck, heart, or legs? No   3. Do you have chest pain with activity? No   4. Do you have a history of  heart failure? No   5. Do you currently have a cold, bronchitis or symptoms of other infection? No   6. Do you have a cough, shortness of breath, or wheezing? No   7. Do you or anyone in your family have  previous history of blood clots? No   8. Do you or does anyone in your family have a serious bleeding problem such as prolonged bleeding following surgeries or cuts? No   9. Have you ever had problems with anemia or been told to take iron pills? No   10. Have you had any abnormal blood loss such as black, tarry or bloody stools, or abnormal vaginal bleeding? No   11. Have you ever had a blood transfusion? No   12. Are you willing to have a blood transfusion if it is medically needed before, during, or after your surgery? Yes   13. Have you or any of your relatives ever had problems with anesthesia? No   14. Do you have sleep apnea, excessive snoring or daytime drowsiness? No   15. Do you have any artifical heart valves or other implanted medical devices like a pacemaker, defibrillator, or continuous glucose monitor? No   16. Do you have artificial joints? No   17. Are you allergic to latex? No   18. Is there any chance that you may be pregnant? No - no current procedure      Health Care Directive:  Patient does not have a Health Care Directive or Living Will: Discussed advance care planning with patient; however, patient declined at this time.    Preoperative Review of :   reviewed - controlled substances prescribed by other outside provider(s).      Status of Chronic Conditions:  See problem list for active medical problems.  Problems all longstanding and stable, except as noted/documented.  See ROS for pertinent symptoms related to these conditions.    Recently gave birth by  healing without any problems     Review of Systems  CONSTITUTIONAL: NEGATIVE for fever, chills, change in weight  INTEGUMENTARY/SKIN: NEGATIVE for worrisome rashes, moles or lesions  EYES: NEGATIVE for vision changes or irritation  ENT/MOUTH: NEGATIVE for ear, mouth and throat problems  RESP: NEGATIVE for significant cough or SOB  CV: NEGATIVE for chest pain, palpitations or peripheral edema  GI: NEGATIVE for nausea, abdominal  pain, heartburn, or change in bowel habits  : NEGATIVE for frequency, dysuria, or hematuria  MUSCULOSKELETAL: NEGATIVE for significant arthralgias or myalgia  NEURO: NEGATIVE for weakness, dizziness or paresthesias  ENDOCRINE: NEGATIVE for temperature intolerance, skin/hair changes  HEME: NEGATIVE for bleeding problems  PSYCHIATRIC: NEGATIVE for changes in mood or affect    Patient Active Problem List    Diagnosis Date Noted     GBS (group B Streptococcus carrier), +RV culture, currently pregnant 2022     Priority: Medium     Obesity affecting pregnancy in third trimester 2022     Priority: Medium     Anesthesia recommending delivery in [place] due to high BMI - referral placed.       First pregnancy, first trimester 01/10/2022     Priority: Medium     No Covid Infection  Vaccinated and Boosted against Covid  Flu Done  81 mg ASA  anesthesia consult  NIPT - Girl       Rubella non-immune status, antepartum 2021     Priority: Medium     Rh negative state in antepartum period 2021     Priority: Medium     Abnormal Pap smear of cervix-cytology negative, HPV other positive, repeat cotesting in 10/2021 10/12/2020     Priority: Medium     Morbid obesity (H) 2019     Priority: Medium      No past medical history on file.  Past Surgical History:   Procedure Laterality Date      SECTION  2022     Current Outpatient Medications   Medication Sig Dispense Refill     Prenatal Vit-Fe Fumarate-FA (PRENATAL VITAMIN PO) Take 1 tablet by mouth daily         Allergies   Allergen Reactions     No Known Drug Allergy         Social History     Tobacco Use     Smoking status: Never Smoker     Smokeless tobacco: Never Used   Substance Use Topics     Alcohol use: Yes     Alcohol/week: 0.8 standard drinks     Types: 1 Standard drinks or equivalent per week     Comment: occasional     Family History   Problem Relation Age of Onset     Celiac Disease Mother      Cancer Maternal Grandmother      Cancer  "Maternal Grandfather         liver     Heart Disease Paternal Grandmother      Heart Disease Paternal Grandfather      Breast Cancer No family hx of      Colon Cancer No family hx of      History   Drug Use No         Objective     /60   Pulse 88   Temp 97.7  F (36.5  C) (Tympanic)   Ht 1.702 m (5' 7\")   Wt 124.7 kg (275 lb)   LMP 10/18/2021   SpO2 98%   BMI 43.07 kg/m      Physical Exam    GENERAL APPEARANCE: healthy, alert and no distress     EYES: EOMI, PERRL     HENT: ear canals and TM's normal and nose and mouth without ulcers or lesions     NECK: no adenopathy, no asymmetry, masses, or scars and thyroid normal to palpation     RESP: lungs clear to auscultation - no rales, rhonchi or wheezes     CV: regular rates and rhythm, normal S1 S2, no S3 or S4 and no murmur, click or rub     ABDOMEN:  soft, nontender, no HSM or masses and bowel sounds normal     MS: extremities normal- no gross deformities noted, no evidence of inflammation in joints, FROM in all extremities.     SKIN: no suspicious lesions or rashes     NEURO: Normal strength and tone, sensory exam grossly normal, mentation intact and speech normal     PSYCH: mentation appears normal. and affect normal/bright     LYMPHATICS: No cervical adenopathy    Recent Labs   Lab Test 08/09/22  1205 05/05/22  1120 12/10/21  1540 11/05/21  1513   HGB 13.6 13.2   < > 13.9    295   < > 320     --   --  137   POTASSIUM 3.6  --   --  3.1*   CR 0.58  --   --  0.68    < > = values in this interval not displayed.        Diagnostics:  Recent Results (from the past 24 hour(s))   Basic metabolic panel    Collection Time: 09/02/22 10:58 AM   Result Value Ref Range    Sodium 138 133 - 144 mmol/L    Potassium 3.7 3.4 - 5.3 mmol/L    Chloride 107 94 - 109 mmol/L    Carbon Dioxide (CO2) 30 20 - 32 mmol/L    Anion Gap 1 (L) 3 - 14 mmol/L    Urea Nitrogen 6 (L) 7 - 30 mg/dL    Creatinine 0.70 0.52 - 1.04 mg/dL    Calcium 8.9 8.5 - 10.1 mg/dL    Glucose 77 " 70 - 99 mg/dL    GFR Estimate >90 >60 mL/min/1.73m2   CBC with platelets and differential    Collection Time: 09/02/22 10:58 AM   Result Value Ref Range    WBC Count 5.3 4.0 - 11.0 10e3/uL    RBC Count 4.54 3.80 - 5.20 10e6/uL    Hemoglobin 13.5 11.7 - 15.7 g/dL    Hematocrit 41.2 35.0 - 47.0 %    MCV 91 78 - 100 fL    MCH 29.7 26.5 - 33.0 pg    MCHC 32.8 31.5 - 36.5 g/dL    RDW 12.5 10.0 - 15.0 %    Platelet Count 281 150 - 450 10e3/uL    % Neutrophils 60 %    % Lymphocytes 30 %    % Monocytes 7 %    % Eosinophils 2 %    % Basophils 1 %    % Immature Granulocytes 0 %    NRBCs per 100 WBC 0 <1 /100    Absolute Neutrophils 3.1 1.6 - 8.3 10e3/uL    Absolute Lymphocytes 1.6 0.8 - 5.3 10e3/uL    Absolute Monocytes 0.4 0.0 - 1.3 10e3/uL    Absolute Eosinophils 0.1 0.0 - 0.7 10e3/uL    Absolute Basophils 0.0 0.0 - 0.2 10e3/uL    Absolute Immature Granulocytes 0.0 <=0.4 10e3/uL    Absolute NRBCs 0.0 10e3/uL      No EKG required, no history of coronary heart disease, significant arrhythmia, peripheral arterial disease or other structural heart disease.    Revised Cardiac Risk Index (RCRI):  The patient has the following serious cardiovascular risks for perioperative complications:   - No serious cardiac risks = 0 points     RCRI Interpretation: 1 point: Class II (low risk - 0.9% complication rate)           Signed Electronically by: ROSITA Ferreira CNP  Copy of this evaluation report is provided to requesting physician.

## 2022-09-01 ENCOUNTER — PRENATAL OFFICE VISIT (OUTPATIENT)
Dept: OBGYN | Facility: OTHER | Age: 32
End: 2022-09-01
Attending: OBSTETRICS & GYNECOLOGY
Payer: COMMERCIAL

## 2022-09-01 VITALS
HEIGHT: 67 IN | HEART RATE: 82 BPM | SYSTOLIC BLOOD PRESSURE: 110 MMHG | OXYGEN SATURATION: 100 % | DIASTOLIC BLOOD PRESSURE: 70 MMHG | WEIGHT: 276 LBS | TEMPERATURE: 97.4 F | BODY MASS INDEX: 43.32 KG/M2

## 2022-09-01 PROCEDURE — 99207 PR POST PARTUM EXAM: CPT | Performed by: OBSTETRICS & GYNECOLOGY

## 2022-09-01 ASSESSMENT — PAIN SCALES - GENERAL: PAINLEVEL: NO PAIN (0)

## 2022-09-01 NOTE — NURSING NOTE
"Chief Complaint   Patient presents with     Postpartum Care     6 week pp, c/s 7/23/22, lps 11/5/21 normal, girl-, Breast and formula, no contraception.       Initial /70 (BP Location: Left arm, Patient Position: Chair, Cuff Size: Adult Large)   Pulse 82   Temp 97.4  F (36.3  C) (Tympanic)   Ht 1.702 m (5' 7\")   Wt 125.2 kg (276 lb)   LMP 10/18/2021   SpO2 100%   BMI 43.23 kg/m   Estimated body mass index is 43.23 kg/m  as calculated from the following:    Height as of this encounter: 1.702 m (5' 7\").    Weight as of this encounter: 125.2 kg (276 lb).  Medication Reconciliation: complete  QUIRINO MORENO LPN    "

## 2022-09-01 NOTE — PROGRESS NOTES
"SUBJECTIVE:  Destini Tee is a 32 year old female P1 here for a postpartum visit.  She had a  Section   delivering a healthy baby girl ()  Currently no complaints and doing well.    Today's Depression Rating was 0PHQ9    delivery complications:  No  breast feeding:  Yes  bladder problems:  No  bowel problems/hemorrhoids:  No  episiotomy/laceration/incision healed? Yes  vaginal flow:  None  Rice:  No  contraception:  abstinence  emotional adjustment:  doing well and happy      OBJECTIVE:  Blood pressure 110/70, pulse 82, temperature 97.4  F (36.3  C), temperature source Tympanic, height 1.702 m (5' 7\"), weight 125.2 kg (276 lb), last menstrual period 10/18/2021, SpO2 100 %, unknown if currently breastfeeding.   General - pleasant female in no acute distress.  Abdomen - soft, nontender, nondistended, no hepatosplenomegaly.  Incision:  Clean, dry, intact.  No erythema or drainage.    Rectovaginal - deferred.    ASSESSMENT:  normal postpartum exam/PO.  Released from pregnancy related restrictions    PLAN:    May resume normal activities without restrictions  Pap smear Not Done today    The patient declines BC birth control. Full counseling was provided, and all questions answered. Recommend waiting 1 yr before trying to conceive due to recent CS and associated risks.   Return to clinic in one year for an annual, when due for a pap smear or PRN.    Remy Toney MD  "

## 2022-09-02 ENCOUNTER — LAB (OUTPATIENT)
Dept: LAB | Facility: OTHER | Age: 32
End: 2022-09-02
Attending: NURSE PRACTITIONER
Payer: COMMERCIAL

## 2022-09-02 ENCOUNTER — OFFICE VISIT (OUTPATIENT)
Dept: FAMILY MEDICINE | Facility: OTHER | Age: 32
End: 2022-09-02
Attending: NURSE PRACTITIONER
Payer: COMMERCIAL

## 2022-09-02 VITALS
OXYGEN SATURATION: 98 % | HEIGHT: 67 IN | WEIGHT: 275 LBS | SYSTOLIC BLOOD PRESSURE: 106 MMHG | HEART RATE: 88 BPM | BODY MASS INDEX: 43.16 KG/M2 | TEMPERATURE: 97.7 F | DIASTOLIC BLOOD PRESSURE: 60 MMHG

## 2022-09-02 DIAGNOSIS — Z01.818 PREOP GENERAL PHYSICAL EXAM: Primary | ICD-10-CM

## 2022-09-02 DIAGNOSIS — K82.8 ENLARGED GALLBLADDER: ICD-10-CM

## 2022-09-02 DIAGNOSIS — Z01.818 PREOP TESTING: ICD-10-CM

## 2022-09-02 DIAGNOSIS — Z01.818 PREOP GENERAL PHYSICAL EXAM: ICD-10-CM

## 2022-09-02 LAB
ANION GAP SERPL CALCULATED.3IONS-SCNC: 1 MMOL/L (ref 3–14)
BASOPHILS # BLD AUTO: 0 10E3/UL (ref 0–0.2)
BASOPHILS NFR BLD AUTO: 1 %
BUN SERPL-MCNC: 6 MG/DL (ref 7–30)
CALCIUM SERPL-MCNC: 8.9 MG/DL (ref 8.5–10.1)
CHLORIDE BLD-SCNC: 107 MMOL/L (ref 94–109)
CO2 SERPL-SCNC: 30 MMOL/L (ref 20–32)
CREAT SERPL-MCNC: 0.7 MG/DL (ref 0.52–1.04)
EOSINOPHIL # BLD AUTO: 0.1 10E3/UL (ref 0–0.7)
EOSINOPHIL NFR BLD AUTO: 2 %
ERYTHROCYTE [DISTWIDTH] IN BLOOD BY AUTOMATED COUNT: 12.5 % (ref 10–15)
GFR SERPL CREATININE-BSD FRML MDRD: >90 ML/MIN/1.73M2
GLUCOSE BLD-MCNC: 77 MG/DL (ref 70–99)
HCT VFR BLD AUTO: 41.2 % (ref 35–47)
HGB BLD-MCNC: 13.5 G/DL (ref 11.7–15.7)
IMM GRANULOCYTES # BLD: 0 10E3/UL
IMM GRANULOCYTES NFR BLD: 0 %
LYMPHOCYTES # BLD AUTO: 1.6 10E3/UL (ref 0.8–5.3)
LYMPHOCYTES NFR BLD AUTO: 30 %
MCH RBC QN AUTO: 29.7 PG (ref 26.5–33)
MCHC RBC AUTO-ENTMCNC: 32.8 G/DL (ref 31.5–36.5)
MCV RBC AUTO: 91 FL (ref 78–100)
MONOCYTES # BLD AUTO: 0.4 10E3/UL (ref 0–1.3)
MONOCYTES NFR BLD AUTO: 7 %
NEUTROPHILS # BLD AUTO: 3.1 10E3/UL (ref 1.6–8.3)
NEUTROPHILS NFR BLD AUTO: 60 %
NRBC # BLD AUTO: 0 10E3/UL
NRBC BLD AUTO-RTO: 0 /100
PLATELET # BLD AUTO: 281 10E3/UL (ref 150–450)
POTASSIUM BLD-SCNC: 3.7 MMOL/L (ref 3.4–5.3)
RBC # BLD AUTO: 4.54 10E6/UL (ref 3.8–5.2)
SARS-COV-2 RNA RESP QL NAA+PROBE: NEGATIVE
SODIUM SERPL-SCNC: 138 MMOL/L (ref 133–144)
WBC # BLD AUTO: 5.3 10E3/UL (ref 4–11)

## 2022-09-02 PROCEDURE — 99214 OFFICE O/P EST MOD 30 MIN: CPT | Performed by: NURSE PRACTITIONER

## 2022-09-02 PROCEDURE — U0003 INFECTIOUS AGENT DETECTION BY NUCLEIC ACID (DNA OR RNA); SEVERE ACUTE RESPIRATORY SYNDROME CORONAVIRUS 2 (SARS-COV-2) (CORONAVIRUS DISEASE [COVID-19]), AMPLIFIED PROBE TECHNIQUE, MAKING USE OF HIGH THROUGHPUT TECHNOLOGIES AS DESCRIBED BY CMS-2020-01-R: HCPCS | Performed by: NURSE PRACTITIONER

## 2022-09-02 PROCEDURE — 85025 COMPLETE CBC W/AUTO DIFF WBC: CPT

## 2022-09-02 PROCEDURE — 36415 COLL VENOUS BLD VENIPUNCTURE: CPT

## 2022-09-02 PROCEDURE — 80048 BASIC METABOLIC PNL TOTAL CA: CPT

## 2022-09-02 PROCEDURE — U0005 INFEC AGEN DETEC AMPLI PROBE: HCPCS | Performed by: NURSE PRACTITIONER

## 2022-09-02 ASSESSMENT — PAIN SCALES - GENERAL: PAINLEVEL: NO PAIN (0)

## 2022-09-02 NOTE — NURSING NOTE
"Chief Complaint   Patient presents with     Pre-Op Exam     Lap oxana       Initial /60   Pulse 88   Temp 97.7  F (36.5  C) (Tympanic)   Ht 1.702 m (5' 7\")   Wt 124.7 kg (275 lb)   LMP 10/18/2021   SpO2 98%   BMI 43.07 kg/m   Estimated body mass index is 43.07 kg/m  as calculated from the following:    Height as of this encounter: 1.702 m (5' 7\").    Weight as of this encounter: 124.7 kg (275 lb).  Medication Reconciliation: complete  Bobby Glover LPN  "

## 2022-09-02 NOTE — PATIENT INSTRUCTIONS
Preparing for Your Surgery  Getting started  A nurse will call you to review your health history and instructions. They will give you an arrival time based on your scheduled surgery time. Please be ready to share:    Your doctor's clinic name and phone number    Your medical, surgical and anesthesia history    A list of allergies and sensitivities    A list of medicines, including herbal treatments and over-the-counter drugs    Whether the patient has a legal guardian (ask how to send us the papers in advance)  Please tell us if you're pregnant--or if there's any chance you might be pregnant. Some surgeries may injure a fetus (unborn baby), so they require a pregnancy test. Surgeries that are safe for a fetus don't always need a test, and you can choose whether to have one.   If you have a child who's having surgery, please ask for a copy of Preparing for Your Child's Surgery.    Preparing for surgery    Within 30 days of surgery: Have a pre-op exam (sometimes called an H&P, or History and Physical). This can be done at a clinic or pre-operative center.  ? If you're having a , you may not need this exam. Talk to your care team.    At your pre-op exam, talk to your care team about all medicines you take. If you need to stop any medicines before surgery, ask when to start taking them again.  ? We do this for your safety. Many medicines can make you bleed too much during surgery. Some change how well surgery (anesthesia) drugs work.    Call your insurance company to let them know you're having surgery. (If you don't have insurance, call [phone].)    Call your clinic if there's any change in your health. This includes signs of a cold or flu (sore throat, runny nose, cough, rash, fever). It also includes a scrape or scratch near the surgery site.    If you have questions on the day of surgery, call your hospital or surgery center.  COVID testing  You may need to be tested for COVID-19 before having  surgery. If so, we will give you instructions.  Eating and drinking guidelines  For your safety: Unless your surgeon tells you otherwise, follow the guidelines below.    Eat and drink as usual until 8 hours before surgery. After that, no food or milk.    Drink clear liquids until 2 hours before surgery. These are liquids you can see through, like water, Gatorade and Propel Water. You may also have black coffee and tea (no cream or milk).    Nothing by mouth within 2 hours of surgery. This includes gum, candy and breath mints.    If you drink alcohol: Stop drinking it the night before surgery.    If your care team tells you to take medicine on the morning of surgery, it's okay to take it with a sip of water.  Preventing infection    Shower or bathe the night before and morning of your surgery. Follow the instructions your clinic gave you. (If no instructions, use regular soap.)    Don't shave or clip hair near your surgery site. We'll remove the hair if needed.    Don't smoke or vape the morning of surgery. You may chew nicotine gum up to 2 hours before surgery. A nicotine patch is okay.  ? Note: Some surgeries require you to completely quit smoking and nicotine. Check with your surgeon.    Your care team will make every effort to keep you safe from infection. We will:  ? Clean our hands often with soap and water (or an alcohol-based hand rub).  ? Clean the skin at your surgery site with a special soap that kills germs.  ? Give you a special gown to keep you warm. (Cold raises the risk of infection.)  ? Wear special hair covers, masks, gowns and gloves during surgery.  ? Give antibiotic medicine, if prescribed. Not all surgeries need antibiotics.  What to bring on the day of surgery    Photo ID and insurance card    Copy of your health care directive, if you have one    Glasses and hearing aides (bring cases)  ? You can't wear contacts during surgery    Inhaler and eye drops, if you use them (tell us about these when  you arrive)    CPAP machine or breathing device, if you use them    A few personal items, if spending the night    If you have . . .  ? A pacemaker, ICD (cardiac defibrillator) or other implant: Bring the ID card.  ? An implanted stimulator: Bring the remote control.  ? A legal guardian: Bring a copy of the certified (court-stamped) guardianship papers.  Please remove any jewelry, including body piercings. Leave jewelry and other valuables at home.  If you're going home the day of surgery    You must have a responsible adult drive you home. They should stay with you overnight as well.    If you don't have someone to stay with you, and you aren't safe to go home alone, we may keep you overnight. Insurance often won't pay for this.  After surgery  If it's hard to control your pain or you need more pain medicine, please call your surgeon's office.  Questions?   If you have any questions for your care team, list them here: _________________________________________________________________________________________________________________________________________________________________________ ____________________________________ ____________________________________ ____________________________________  For informational purposes only. Not to replace the advice of your health care provider. Copyright   2003, 2019 [facility]. All rights reserved. Clinically reviewed by Sada Lucas MD. [employer] [id number] - REV 07/21.

## 2022-09-04 ENCOUNTER — HEALTH MAINTENANCE LETTER (OUTPATIENT)
Age: 32
End: 2022-09-04

## 2022-09-06 ENCOUNTER — ANESTHESIA (OUTPATIENT)
Dept: SURGERY | Facility: HOSPITAL | Age: 32
End: 2022-09-06
Payer: COMMERCIAL

## 2022-09-06 ENCOUNTER — HOSPITAL ENCOUNTER (OUTPATIENT)
Facility: HOSPITAL | Age: 32
Discharge: HOME | End: 2022-09-06
Attending: SURGERY | Admitting: SURGERY
Payer: COMMERCIAL

## 2022-09-06 ENCOUNTER — APPOINTMENT (OUTPATIENT)
Dept: LAB | Facility: HOSPITAL | Age: 32
End: 2022-09-06
Attending: SURGERY
Payer: COMMERCIAL

## 2022-09-06 VITALS
HEART RATE: 59 BPM | HEIGHT: 67 IN | DIASTOLIC BLOOD PRESSURE: 79 MMHG | RESPIRATION RATE: 16 BRPM | TEMPERATURE: 97.6 F | OXYGEN SATURATION: 95 % | WEIGHT: 274 LBS | BODY MASS INDEX: 43 KG/M2 | SYSTOLIC BLOOD PRESSURE: 129 MMHG

## 2022-09-06 DIAGNOSIS — G89.18 ACUTE POST-OPERATIVE PAIN: Primary | ICD-10-CM

## 2022-09-06 LAB — HCG UR QL: NEGATIVE

## 2022-09-06 PROCEDURE — 710N000012 HC RECOVERY PHASE 2, PER MINUTE: Performed by: SURGERY

## 2022-09-06 PROCEDURE — 250N000011 HC RX IP 250 OP 636: Performed by: NURSE ANESTHETIST, CERTIFIED REGISTERED

## 2022-09-06 PROCEDURE — 250N000011 HC RX IP 250 OP 636: Performed by: SURGERY

## 2022-09-06 PROCEDURE — 710N000010 HC RECOVERY PHASE 1, LEVEL 2, PER MIN: Performed by: SURGERY

## 2022-09-06 PROCEDURE — 360N000076 HC SURGERY LEVEL 3, PER MIN: Performed by: SURGERY

## 2022-09-06 PROCEDURE — 47562 LAPAROSCOPIC CHOLECYSTECTOMY: CPT | Performed by: SURGERY

## 2022-09-06 PROCEDURE — 272N000001 HC OR GENERAL SUPPLY STERILE: Performed by: SURGERY

## 2022-09-06 PROCEDURE — 81025 URINE PREGNANCY TEST: CPT | Performed by: NURSE ANESTHETIST, CERTIFIED REGISTERED

## 2022-09-06 PROCEDURE — 88304 TISSUE EXAM BY PATHOLOGIST: CPT | Mod: TC | Performed by: SURGERY

## 2022-09-06 PROCEDURE — 370N000017 HC ANESTHESIA TECHNICAL FEE, PER MIN: Performed by: SURGERY

## 2022-09-06 PROCEDURE — 47562 LAPAROSCOPIC CHOLECYSTECTOMY: CPT | Mod: AS | Performed by: CLINICAL NURSE SPECIALIST

## 2022-09-06 PROCEDURE — 250N000025 HC SEVOFLURANE, PER MIN: Performed by: SURGERY

## 2022-09-06 PROCEDURE — 88304 TISSUE EXAM BY PATHOLOGIST: CPT | Mod: 26 | Performed by: PATHOLOGY

## 2022-09-06 PROCEDURE — 999N000141 HC STATISTIC PRE-PROCEDURE NURSING ASSESSMENT: Performed by: SURGERY

## 2022-09-06 PROCEDURE — 258N000003 HC RX IP 258 OP 636: Performed by: NURSE ANESTHETIST, CERTIFIED REGISTERED

## 2022-09-06 PROCEDURE — 47562 LAPAROSCOPIC CHOLECYSTECTOMY: CPT | Performed by: NURSE ANESTHETIST, CERTIFIED REGISTERED

## 2022-09-06 PROCEDURE — 250N000009 HC RX 250: Performed by: NURSE ANESTHETIST, CERTIFIED REGISTERED

## 2022-09-06 RX ORDER — FENTANYL CITRATE 50 UG/ML
INJECTION, SOLUTION INTRAMUSCULAR; INTRAVENOUS PRN
Status: DISCONTINUED | OUTPATIENT
Start: 2022-09-06 | End: 2022-09-06

## 2022-09-06 RX ORDER — CEFAZOLIN SODIUM/WATER 3 G/30 ML
3 SYRINGE (ML) INTRAVENOUS
Status: COMPLETED | OUTPATIENT
Start: 2022-09-06 | End: 2022-09-06

## 2022-09-06 RX ORDER — LIDOCAINE HYDROCHLORIDE 20 MG/ML
INJECTION, SOLUTION INFILTRATION; PERINEURAL PRN
Status: DISCONTINUED | OUTPATIENT
Start: 2022-09-06 | End: 2022-09-06

## 2022-09-06 RX ORDER — ONDANSETRON 4 MG/1
4 TABLET, ORALLY DISINTEGRATING ORAL EVERY 30 MIN PRN
Status: DISCONTINUED | OUTPATIENT
Start: 2022-09-06 | End: 2022-09-06 | Stop reason: HOSPADM

## 2022-09-06 RX ORDER — HYDRALAZINE HYDROCHLORIDE 20 MG/ML
2.5-5 INJECTION INTRAMUSCULAR; INTRAVENOUS EVERY 10 MIN PRN
Status: DISCONTINUED | OUTPATIENT
Start: 2022-09-06 | End: 2022-09-06 | Stop reason: HOSPADM

## 2022-09-06 RX ORDER — KETAMINE HYDROCHLORIDE 10 MG/ML
INJECTION INTRAMUSCULAR; INTRAVENOUS PRN
Status: DISCONTINUED | OUTPATIENT
Start: 2022-09-06 | End: 2022-09-06

## 2022-09-06 RX ORDER — BUPIVACAINE HYDROCHLORIDE 2.5 MG/ML
INJECTION, SOLUTION EPIDURAL; INFILTRATION; INTRACAUDAL
Status: DISCONTINUED
Start: 2022-09-06 | End: 2022-09-06 | Stop reason: HOSPADM

## 2022-09-06 RX ORDER — DEXAMETHASONE SODIUM PHOSPHATE 10 MG/ML
INJECTION, SOLUTION INTRAMUSCULAR; INTRAVENOUS PRN
Status: DISCONTINUED | OUTPATIENT
Start: 2022-09-06 | End: 2022-09-06

## 2022-09-06 RX ORDER — ONDANSETRON 2 MG/ML
INJECTION INTRAMUSCULAR; INTRAVENOUS PRN
Status: DISCONTINUED | OUTPATIENT
Start: 2022-09-06 | End: 2022-09-06

## 2022-09-06 RX ORDER — HYDROCODONE BITARTRATE AND ACETAMINOPHEN 5; 325 MG/1; MG/1
1 TABLET ORAL ONCE
Status: DISCONTINUED | OUTPATIENT
Start: 2022-09-06 | End: 2022-09-06 | Stop reason: HOSPADM

## 2022-09-06 RX ORDER — HYDROCODONE BITARTRATE AND ACETAMINOPHEN 5; 325 MG/1; MG/1
1-2 TABLET ORAL EVERY 4 HOURS PRN
Qty: 20 TABLET | Refills: 0 | Status: SHIPPED | OUTPATIENT
Start: 2022-09-06 | End: 2023-02-10

## 2022-09-06 RX ORDER — CEFAZOLIN SODIUM/WATER 3 G/30 ML
3 SYRINGE (ML) INTRAVENOUS SEE ADMIN INSTRUCTIONS
Status: DISCONTINUED | OUTPATIENT
Start: 2022-09-06 | End: 2022-09-06 | Stop reason: HOSPADM

## 2022-09-06 RX ORDER — FENTANYL CITRATE 50 UG/ML
50 INJECTION, SOLUTION INTRAMUSCULAR; INTRAVENOUS
Status: DISCONTINUED | OUTPATIENT
Start: 2022-09-06 | End: 2022-09-06 | Stop reason: HOSPADM

## 2022-09-06 RX ORDER — PROPOFOL 10 MG/ML
INJECTION, EMULSION INTRAVENOUS PRN
Status: DISCONTINUED | OUTPATIENT
Start: 2022-09-06 | End: 2022-09-06

## 2022-09-06 RX ORDER — SODIUM CHLORIDE, SODIUM LACTATE, POTASSIUM CHLORIDE, CALCIUM CHLORIDE 600; 310; 30; 20 MG/100ML; MG/100ML; MG/100ML; MG/100ML
INJECTION, SOLUTION INTRAVENOUS CONTINUOUS
Status: DISCONTINUED | OUTPATIENT
Start: 2022-09-06 | End: 2022-09-06 | Stop reason: HOSPADM

## 2022-09-06 RX ORDER — FENTANYL CITRATE 50 UG/ML
50 INJECTION, SOLUTION INTRAMUSCULAR; INTRAVENOUS EVERY 5 MIN PRN
Status: DISCONTINUED | OUTPATIENT
Start: 2022-09-06 | End: 2022-09-06 | Stop reason: HOSPADM

## 2022-09-06 RX ORDER — KETOROLAC TROMETHAMINE 30 MG/ML
INJECTION, SOLUTION INTRAMUSCULAR; INTRAVENOUS PRN
Status: DISCONTINUED | OUTPATIENT
Start: 2022-09-06 | End: 2022-09-06

## 2022-09-06 RX ORDER — HYDROXYZINE HYDROCHLORIDE 50 MG/ML
50 INJECTION, SOLUTION INTRAMUSCULAR ONCE
Status: COMPLETED | OUTPATIENT
Start: 2022-09-06 | End: 2022-09-06

## 2022-09-06 RX ORDER — NALOXONE HYDROCHLORIDE 0.4 MG/ML
0.4 INJECTION, SOLUTION INTRAMUSCULAR; INTRAVENOUS; SUBCUTANEOUS
Status: DISCONTINUED | OUTPATIENT
Start: 2022-09-06 | End: 2022-09-06 | Stop reason: HOSPADM

## 2022-09-06 RX ORDER — NALOXONE HYDROCHLORIDE 0.4 MG/ML
0.2 INJECTION, SOLUTION INTRAMUSCULAR; INTRAVENOUS; SUBCUTANEOUS
Status: DISCONTINUED | OUTPATIENT
Start: 2022-09-06 | End: 2022-09-06 | Stop reason: HOSPADM

## 2022-09-06 RX ORDER — LIDOCAINE 40 MG/G
CREAM TOPICAL
Status: DISCONTINUED | OUTPATIENT
Start: 2022-09-06 | End: 2022-09-06 | Stop reason: HOSPADM

## 2022-09-06 RX ORDER — BUPIVACAINE HYDROCHLORIDE 2.5 MG/ML
INJECTION, SOLUTION INFILTRATION; PERINEURAL PRN
Status: DISCONTINUED | OUTPATIENT
Start: 2022-09-06 | End: 2022-09-06 | Stop reason: HOSPADM

## 2022-09-06 RX ORDER — HEPARIN SODIUM 5000 [USP'U]/.5ML
5000 INJECTION, SOLUTION INTRAVENOUS; SUBCUTANEOUS
Status: COMPLETED | OUTPATIENT
Start: 2022-09-06 | End: 2022-09-06

## 2022-09-06 RX ORDER — HYDROMORPHONE HYDROCHLORIDE 1 MG/ML
0.4 INJECTION, SOLUTION INTRAMUSCULAR; INTRAVENOUS; SUBCUTANEOUS EVERY 10 MIN PRN
Status: DISCONTINUED | OUTPATIENT
Start: 2022-09-06 | End: 2022-09-06 | Stop reason: HOSPADM

## 2022-09-06 RX ORDER — ONDANSETRON 2 MG/ML
4 INJECTION INTRAMUSCULAR; INTRAVENOUS EVERY 30 MIN PRN
Status: DISCONTINUED | OUTPATIENT
Start: 2022-09-06 | End: 2022-09-06 | Stop reason: HOSPADM

## 2022-09-06 RX ORDER — MEPERIDINE HYDROCHLORIDE 25 MG/ML
12.5 INJECTION INTRAMUSCULAR; INTRAVENOUS; SUBCUTANEOUS
Status: DISCONTINUED | OUTPATIENT
Start: 2022-09-06 | End: 2022-09-06 | Stop reason: HOSPADM

## 2022-09-06 RX ADMIN — MIDAZOLAM 2 MG: 1 INJECTION INTRAMUSCULAR; INTRAVENOUS at 13:05

## 2022-09-06 RX ADMIN — Medication 100 MG: at 13:10

## 2022-09-06 RX ADMIN — HYDROMORPHONE HYDROCHLORIDE 0.5 MG: 1 INJECTION, SOLUTION INTRAMUSCULAR; INTRAVENOUS; SUBCUTANEOUS at 14:26

## 2022-09-06 RX ADMIN — ONDANSETRON 4 MG: 2 INJECTION INTRAMUSCULAR; INTRAVENOUS at 14:11

## 2022-09-06 RX ADMIN — PROPOFOL 250 MG: 10 INJECTION, EMULSION INTRAVENOUS at 13:10

## 2022-09-06 RX ADMIN — KETOROLAC TROMETHAMINE 30 MG: 30 INJECTION, SOLUTION INTRAMUSCULAR at 14:11

## 2022-09-06 RX ADMIN — ROCURONIUM BROMIDE 5 MG: 10 INJECTION INTRAVENOUS at 13:10

## 2022-09-06 RX ADMIN — FENTANYL CITRATE 50 MCG: 50 INJECTION, SOLUTION INTRAMUSCULAR; INTRAVENOUS at 15:17

## 2022-09-06 RX ADMIN — Medication 3 G: at 13:04

## 2022-09-06 RX ADMIN — SUGAMMADEX 150 MG: 100 INJECTION, SOLUTION INTRAVENOUS at 14:18

## 2022-09-06 RX ADMIN — ROCURONIUM BROMIDE 45 MG: 10 INJECTION INTRAVENOUS at 13:16

## 2022-09-06 RX ADMIN — PROPOFOL 50 MG: 10 INJECTION, EMULSION INTRAVENOUS at 13:14

## 2022-09-06 RX ADMIN — FENTANYL CITRATE 50 MCG: 50 INJECTION, SOLUTION INTRAMUSCULAR; INTRAVENOUS at 14:57

## 2022-09-06 RX ADMIN — FENTANYL CITRATE 100 MCG: 50 INJECTION, SOLUTION INTRAMUSCULAR; INTRAVENOUS at 14:20

## 2022-09-06 RX ADMIN — FENTANYL CITRATE 100 MCG: 50 INJECTION, SOLUTION INTRAMUSCULAR; INTRAVENOUS at 13:22

## 2022-09-06 RX ADMIN — Medication 30 MG: at 13:14

## 2022-09-06 RX ADMIN — Medication 20 MG: at 13:43

## 2022-09-06 RX ADMIN — FENTANYL CITRATE 100 MCG: 50 INJECTION, SOLUTION INTRAMUSCULAR; INTRAVENOUS at 13:05

## 2022-09-06 RX ADMIN — HYDROXYZINE HYDROCHLORIDE 50 MG: 50 INJECTION, SOLUTION INTRAMUSCULAR at 16:03

## 2022-09-06 RX ADMIN — SODIUM CHLORIDE, POTASSIUM CHLORIDE, SODIUM LACTATE AND CALCIUM CHLORIDE: 600; 310; 30; 20 INJECTION, SOLUTION INTRAVENOUS at 12:12

## 2022-09-06 RX ADMIN — LIDOCAINE HYDROCHLORIDE 60 MG: 20 INJECTION, SOLUTION INFILTRATION; PERINEURAL at 13:10

## 2022-09-06 RX ADMIN — DEXAMETHASONE SODIUM PHOSPHATE 10 MG: 10 INJECTION, SOLUTION INTRAMUSCULAR; INTRAVENOUS at 13:22

## 2022-09-06 RX ADMIN — HEPARIN SODIUM 5000 UNITS: 5000 INJECTION, SOLUTION INTRAVENOUS; SUBCUTANEOUS at 11:48

## 2022-09-06 ASSESSMENT — ACTIVITIES OF DAILY LIVING (ADL)
ADLS_ACUITY_SCORE: 35

## 2022-09-06 NOTE — ANESTHESIA PROCEDURE NOTES
Airway         Procedure Start/Stop Times: 9/6/2022 1:11 PM  Staff -        CRNA: Gladis Beltran APRN CRNA       Performed By: CRNAIndications and Patient Condition       Induction type:intravenous       Mask difficulty assessment: 1 - vent by mask    Final Airway Details       Final airway type: endotracheal airway       Successful airway: ETT - single  Endotracheal Airway Details        ETT size (mm): 7.0       Successful intubation technique: direct laryngoscopy       DL Blade Type: Last 2       Grade View of Cords: 2       Position: Right       Measured from: lips       Secured at (cm): 22       Bite block used: None    Post intubation assessment        Placement verified by: capnometry, equal breath sounds and chest rise        Number of attempts at approach: 1       Secured with: plastic tape       Ease of procedure: easy       Dentition: Intact and Unchanged    Medication(s) Administered   Medication Administration Time: 9/6/2022 1:11 PM

## 2022-09-06 NOTE — OR NURSING
Patient and responsible adult given discharge instructions with no questions regarding instructions. Kit score 20. Pain level 3/10.  Discharged from unit via wheelchair. Patient discharged to home with .

## 2022-09-06 NOTE — ANESTHESIA CARE TRANSFER NOTE
Patient: Destini Tee    Procedure: Procedure(s):  CHOLECYSTECTOMY, LAPAROSCOPIC       Diagnosis: Calculus of bile duct without cholecystitis and without obstruction [K80.50]  Diagnosis Additional Information: No value filed.    Anesthesia Type:   General     Note:    Oropharynx: oropharynx clear of all foreign objects  Level of Consciousness: drowsy  Oxygen Supplementation: nasal cannula  Level of Supplemental Oxygen (L/min / FiO2): 2  Independent Airway: airway patency satisfactory and stable  Dentition: dentition unchanged  Vital Signs Stable: post-procedure vital signs reviewed and stable  Report to RN Given: handoff report given  Patient transferred to: PACU    Handoff Report: Identifed the Patient, Identified the Reponsible Provider, Reviewed the pertinent medical history, Discussed the surgical course, Reviewed Intra-OP anesthesia mangement and issues during anesthesia, Set expectations for post-procedure period and Allowed opportunity for questions and acknowledgement of understanding      Vitals:  Vitals Value Taken Time   /77 09/06/22 1435   Temp     Pulse 83 09/06/22 1438   Resp 8 09/06/22 1438   SpO2 100 % 09/06/22 1438   Vitals shown include unvalidated device data.    Electronically Signed By: ROSITA Pugh CRNA  September 6, 2022  2:39 PM

## 2022-09-06 NOTE — INTERVAL H&P NOTE
"I have reviewed the surgical (or preoperative) H&P that is linked to this encounter, and examined the patient. There are no significant changes    Clinical Conditions Present on Arrival:  Clinically Significant Risk Factors Present on Admission                   # Severe Obesity: Estimated body mass index is 42.91 kg/m  as calculated from the following:    Height as of this encounter: 1.702 m (5' 7\").    Weight as of this encounter: 124.3 kg (274 lb).       "

## 2022-09-06 NOTE — OP NOTE
[facility] Operative Dictation    PREOPERATIVE DIAGNOSIS: Symptomatic cholelithiasis.     POSTOPERATIVE DIAGNOSES:  Symptomatic cholelithiasis    PROCEDURE: Laparoscopic cholecystectomy.    HISTORY: Please see clinic visit for further details.     OPERATIVE FINDINGS:  The critical view was achieved before clipping and cutting. Short cystic duct.      DESCRIPTION OF PROCEDURE: With the patient in the supine position on the operating room table, general anesthesia was induced. The abdomen was prepped and draped sterilely and the requisite timeout pause observed during which his correct identity and planned procedure were confirmed by the operating room personnel in attendance. An periumbilical incision was made and carried through full thickness skin and subcutaneous tissue.   The fascia was incised and the abdomen was entered. The Andrew cannula was placed and a pneumoperitoneum was achieved with insufflation of carbon dioxide to 15 mmHg pressure and a 5 mm port site was placed in the epigastrium and two 5 mm ports site in the right upper quadrant under direct vision.  The gallbladder was grasped with ratcheted clamp and elevated cephalad. Using blunt dissection, the origin of the cystic duct was identified and the gallbladder neck was released from its reflection on the liver.   The cystic duct was encircled. It was noted to be short as the common bile duct was in clear view.  The extra-hepatic biliary anatomy was of normal appearance, caliber and without anatomic variant. The cystic duct and artery were ligated with 5 mm clips after achieving the critical view. The gallbladder was taken down from its bed on the liver in an antegrade fashion with electrocautery dissection and it was retrieved intact in an endocatch bag through the umbilical port site with small spillage of bile.      The abdomen was irrigated with copious amounts of saline solution.   The liver bed was inspected and satisfactory hemostasis  confirmed. The umbilical port was closed primarily with 0-vicryl suture. The port sites were closed with 4-0 monocryl. Sterile dressings were applied and the patient was transported to the recovery room in satisfactory condition.     The estimated blood loss was 10 cc and there were no apparent intraoperative complications. The procedure was well tolerated and the patient left the operating room in good condition upon confirmation that the final sponge, needle and instruments counts were correct.  The post surgical debriefing was held and acknowledged at completion.    MD Lakisha Roberts actively first assisted during this operation providing necessary retraction and exposure as well as maintaining hemostasis and a clear operative field. This was helpful in allowing the operation to proceed in a safe and expeditious manner. She was present for the entire duration of the operation.

## 2022-09-07 NOTE — ANESTHESIA POSTPROCEDURE EVALUATION
Patient: Destini Tee    Procedure: Procedure(s):  CHOLECYSTECTOMY, LAPAROSCOPIC       Anesthesia Type:  General    Note:  Disposition: Outpatient   Postop Pain Control: Uneventful            Sign Out: Well controlled pain   PONV: No   Neuro/Psych: Uneventful            Sign Out: Acceptable/Baseline neuro status   Airway/Respiratory: Uneventful            Sign Out: Acceptable/Baseline resp. status   CV/Hemodynamics: Uneventful            Sign Out: Acceptable CV status; No obvious hypovolemia; No obvious fluid overload   Other NRE: NONE   DID A NON-ROUTINE EVENT OCCUR? No           Last vitals:  Vitals Value Taken Time   /88 09/06/22 1530   Temp 97.6  F (36.4  C) 09/06/22 1535   Pulse 69 09/06/22 1535   Resp 16 09/06/22 1535   SpO2 99 % 09/06/22 1535       Electronically Signed By: ROSITA Chicas CRNA  September 6, 2022  7:34 PM

## 2022-09-09 LAB
PATH REPORT.COMMENTS IMP SPEC: NORMAL
PATH REPORT.FINAL DX SPEC: NORMAL
PATH REPORT.GROSS SPEC: NORMAL
PATH REPORT.MICROSCOPIC SPEC OTHER STN: NORMAL
PATH REPORT.RELEVANT HX SPEC: NORMAL
PHOTO IMAGE: NORMAL

## 2022-09-22 ENCOUNTER — OFFICE VISIT (OUTPATIENT)
Dept: SURGERY | Facility: OTHER | Age: 32
End: 2022-09-22
Attending: SURGERY
Payer: COMMERCIAL

## 2022-09-22 VITALS
SYSTOLIC BLOOD PRESSURE: 118 MMHG | HEIGHT: 67 IN | WEIGHT: 274 LBS | OXYGEN SATURATION: 98 % | DIASTOLIC BLOOD PRESSURE: 74 MMHG | TEMPERATURE: 97 F | BODY MASS INDEX: 43 KG/M2 | HEART RATE: 85 BPM

## 2022-09-22 DIAGNOSIS — Z90.49 S/P LAPAROSCOPIC CHOLECYSTECTOMY: Primary | ICD-10-CM

## 2022-09-22 PROCEDURE — 99024 POSTOP FOLLOW-UP VISIT: CPT | Performed by: CLINICAL NURSE SPECIALIST

## 2022-09-22 ASSESSMENT — PAIN SCALES - GENERAL: PAINLEVEL: NO PAIN (0)

## 2022-09-22 NOTE — NURSING NOTE
"Chief Complaint   Patient presents with     Post-Op - General Surgery     Follow up- 9/6 lap oxana       Initial /74   Pulse 85   Temp 97  F (36.1  C)   Ht 1.702 m (5' 7\")   Wt 124.3 kg (274 lb)   LMP 10/18/2021   SpO2 98%   BMI 42.91 kg/m   Estimated body mass index is 42.91 kg/m  as calculated from the following:    Height as of this encounter: 1.702 m (5' 7\").    Weight as of this encounter: 124.3 kg (274 lb).  Medication Reconciliation: complete  Devi Berg LPN  "

## 2022-09-22 NOTE — PROGRESS NOTES
"S:  Elysia returns 16 days after laparoscopic cholecystectomy by Dr. Francisco for symptomatic cholelithiasis. Doing well post operatively, tolerating a general diet, having regular bowel movements, passing flatus, no diarrhea, doesn't take narcotics regularly. Specifically denies fevers, chills, nausea, vomiting, shortness of breath.     O:  /74   Pulse 85   Temp 97  F (36.1  C)   Ht 1.702 m (5' 7\")   Wt 124.3 kg (274 lb)   LMP 10/18/2021   SpO2 98%   BMI 42.91 kg/m    LMP 10/18/2021   G: alert oriented, no acute distress   ENT: sclera non-icteric   Pulm: no respiratory distress   CVS: RRR  ABD:  Soft, ND/NT no rebound, no guarding       A/P  #1 S/P Laparoscopic cholecystectomy     She looks wonderful. She has made a satisfactory recovery from surgery. I do not need to see her again unless she has any issues in regard to this surgery. I've provided office phone numbers and told her not to hesitate to call with any questions, comments or concerns. I'd like a call if she has any fevers over 101.3, nausea/vomiting, pain out of control.      Destini Haigh CNS  Surgery and Wound Care  [facility]   "

## 2022-09-22 NOTE — PATIENT INSTRUCTIONS
Thank you for allowing SANDEE Samuels and our surgical team to participate in your care. Please call our health unit coordinator at [phone] with scheduling questions or the nurse at [phone] with any other questions or concerns.

## 2022-12-05 ENCOUNTER — MYC MEDICAL ADVICE (OUTPATIENT)
Dept: FAMILY MEDICINE | Facility: OTHER | Age: 32
End: 2022-12-05

## 2022-12-06 ENCOUNTER — LAB (OUTPATIENT)
Dept: LAB | Facility: OTHER | Age: 32
End: 2022-12-06
Payer: COMMERCIAL

## 2022-12-06 DIAGNOSIS — L50.9 URTICARIA: ICD-10-CM

## 2022-12-06 DIAGNOSIS — L50.9 URTICARIA: Primary | ICD-10-CM

## 2022-12-06 PROCEDURE — 84999 UNLISTED CHEMISTRY PROCEDURE: CPT

## 2022-12-06 PROCEDURE — 36415 COLL VENOUS BLD VENIPUNCTURE: CPT

## 2023-01-06 ENCOUNTER — MYC MEDICAL ADVICE (OUTPATIENT)
Dept: OBGYN | Facility: OTHER | Age: 33
End: 2023-01-06

## 2023-01-06 ENCOUNTER — LAB (OUTPATIENT)
Dept: LAB | Facility: OTHER | Age: 33
End: 2023-01-06
Payer: COMMERCIAL

## 2023-01-06 ENCOUNTER — MYC MEDICAL ADVICE (OUTPATIENT)
Dept: FAMILY MEDICINE | Facility: OTHER | Age: 33
End: 2023-01-06

## 2023-01-06 DIAGNOSIS — Z32.01 PREGNANCY TEST POSITIVE: ICD-10-CM

## 2023-01-06 DIAGNOSIS — Z32.01 PREGNANCY TEST POSITIVE: Primary | ICD-10-CM

## 2023-01-06 LAB — HCG INTACT+B SERPL-ACNC: 98 MIU/ML

## 2023-01-06 PROCEDURE — 84702 CHORIONIC GONADOTROPIN TEST: CPT

## 2023-01-06 PROCEDURE — 36415 COLL VENOUS BLD VENIPUNCTURE: CPT

## 2023-01-09 ENCOUNTER — VIRTUAL VISIT (OUTPATIENT)
Dept: FAMILY MEDICINE | Facility: OTHER | Age: 33
End: 2023-01-09
Attending: NURSE PRACTITIONER
Payer: COMMERCIAL

## 2023-01-09 DIAGNOSIS — E66.01 MORBID OBESITY (H): ICD-10-CM

## 2023-01-09 DIAGNOSIS — Z32.01 PREGNANCY TEST POSITIVE: ICD-10-CM

## 2023-01-09 DIAGNOSIS — N91.2 AMENORRHEA: Primary | ICD-10-CM

## 2023-01-09 PROBLEM — Z34.01 FIRST PREGNANCY, FIRST TRIMESTER: Status: RESOLVED | Noted: 2022-01-10 | Resolved: 2023-01-09

## 2023-01-09 PROCEDURE — 99213 OFFICE O/P EST LOW 20 MIN: CPT | Mod: 95 | Performed by: NURSE PRACTITIONER

## 2023-01-09 NOTE — PROGRESS NOTES
"Elysia is a 32 year old who is being evaluated via a billable telephone visit.      What phone number would you like to be contacted at? On file  How would you like to obtain your AVS? MyChart      Assessment & Plan     Amenorrhea  Pregnancy test positive  Feeling well. Taking a prenatal Vitamin. Denies any alcohol intake. Does not smoke. Confident that her LMP started on 12/6/22. Will follow-up with OB-GYN. I do not think an US is necessary at this time as she is confident on her dates.     She does suffer from obesity. Healthy eating and exercise encouraged.      BMI:   Estimated body mass index is 42.91 kg/m  as calculated from the following:    Height as of 9/22/22: 1.702 m (5' 7\").    Weight as of 9/22/22: 124.3 kg (274 lb).   Weight management plan: Discussed healthy diet and exercise guidelines      Mona Reid NP  [facility] - [facility]    Subjective   Elysia is a 32 year old, presenting for the following health issues:  Lab Result Notice      HPI     Positive HCG   Patient recently had a positive HCG. Levels were 98 on 1/6/23. The first day of her LMP was 12/6/22. Taking a prenatal. She does not smoke. She has not had any alcohol since finding out she was pregnant. Feeling well. No nausea or vomiting.     She has already been in contact with OB-GYN and they are suppose to be calling her to set up an appointment.       Review of Systems   Constitutional, HEENT, cardiovascular, pulmonary, gi and gu systems are negative, except as otherwise noted.      Objective    Vitals - Patient Reported  Pain Score: No Pain (0)        Physical Exam   healthy, alert and no distress  PSYCH: Alert and oriented times 3; coherent speech, normal   rate and volume, able to articulate logical thoughts, able   to abstract reason, no tangential thoughts, no hallucinations   or delusions  Her affect is normal  RESP: No cough, no audible wheezing, able to talk in full sentences  Remainder of exam unable to be completed " due to telephone visits    hCG Quantitative <5 mIU/mL 98 High      Comment: Adult: 0-5 mIU/mL for healthy non-pregnant person   Neonates: Should be within normal ranges by 2 days after birth                  Phone call duration: 5 minutes

## 2023-01-11 LAB
Lab: NORMAL
PERFORMING LABORATORY: NORMAL
SPECIMEN STATUS: NORMAL
TEST NAME: NORMAL

## 2023-01-25 ENCOUNTER — MEDICAL CORRESPONDENCE (OUTPATIENT)
Dept: HEALTH INFORMATION MANAGEMENT | Facility: HOSPITAL | Age: 33
End: 2023-01-25

## 2023-02-09 LAB
ABO/RH(D): NORMAL
ANTIBODY SCREEN: NEGATIVE
SPECIMEN EXPIRATION DATE: NORMAL

## 2023-02-10 ENCOUNTER — PRENATAL OFFICE VISIT (OUTPATIENT)
Dept: OBGYN | Facility: OTHER | Age: 33
End: 2023-02-10
Attending: OBSTETRICS & GYNECOLOGY
Payer: COMMERCIAL

## 2023-02-10 VITALS
SYSTOLIC BLOOD PRESSURE: 110 MMHG | BODY MASS INDEX: 44.89 KG/M2 | HEIGHT: 67 IN | RESPIRATION RATE: 16 BRPM | WEIGHT: 286 LBS | OXYGEN SATURATION: 100 % | DIASTOLIC BLOOD PRESSURE: 68 MMHG | HEART RATE: 90 BPM

## 2023-02-10 DIAGNOSIS — E66.01 CLASS 3 SEVERE OBESITY DUE TO EXCESS CALORIES WITHOUT SERIOUS COMORBIDITY WITH BODY MASS INDEX (BMI) OF 45.0 TO 49.9 IN ADULT (H): Primary | ICD-10-CM

## 2023-02-10 DIAGNOSIS — O34.219 PREVIOUS CESAREAN DELIVERY, ANTEPARTUM CONDITION OR COMPLICATION: ICD-10-CM

## 2023-02-10 DIAGNOSIS — Z34.91 PREGNANCY WITH UNCERTAIN DATES IN FIRST TRIMESTER: ICD-10-CM

## 2023-02-10 DIAGNOSIS — E66.813 CLASS 3 SEVERE OBESITY DUE TO EXCESS CALORIES WITHOUT SERIOUS COMORBIDITY WITH BODY MASS INDEX (BMI) OF 45.0 TO 49.9 IN ADULT (H): Primary | ICD-10-CM

## 2023-02-10 LAB
ALBUMIN UR-MCNC: NEGATIVE MG/DL
AMPHETAMINES UR QL: NOT DETECTED
APPEARANCE UR: CLEAR
BACTERIA #/AREA URNS HPF: ABNORMAL /HPF
BARBITURATES UR QL SCN: NOT DETECTED
BENZODIAZ UR QL SCN: NOT DETECTED
BILIRUB UR QL STRIP: NEGATIVE
BUPRENORPHINE UR QL: NOT DETECTED
CANNABINOIDS UR QL: NOT DETECTED
COCAINE UR QL SCN: NOT DETECTED
COLOR UR AUTO: ABNORMAL
D-METHAMPHET UR QL: NOT DETECTED
ERYTHROCYTE [DISTWIDTH] IN BLOOD BY AUTOMATED COUNT: 12 % (ref 10–15)
GLUCOSE UR STRIP-MCNC: NEGATIVE MG/DL
HCT VFR BLD AUTO: 40 % (ref 35–47)
HGB BLD-MCNC: 13.5 G/DL (ref 11.7–15.7)
HGB UR QL STRIP: NEGATIVE
KETONES UR STRIP-MCNC: NEGATIVE MG/DL
LEUKOCYTE ESTERASE UR QL STRIP: NEGATIVE
MCH RBC QN AUTO: 30.2 PG (ref 26.5–33)
MCHC RBC AUTO-ENTMCNC: 33.8 G/DL (ref 31.5–36.5)
MCV RBC AUTO: 90 FL (ref 78–100)
METHADONE UR QL SCN: NOT DETECTED
MUCOUS THREADS #/AREA URNS LPF: PRESENT /LPF
NITRATE UR QL: NEGATIVE
OPIATES UR QL SCN: NOT DETECTED
OXYCODONE UR QL SCN: NOT DETECTED
PCP UR QL SCN: NOT DETECTED
PH UR STRIP: 5.5 [PH] (ref 4.7–8)
PLATELET # BLD AUTO: 350 10E3/UL (ref 150–450)
PROPOXYPH UR QL: NOT DETECTED
RBC # BLD AUTO: 4.47 10E6/UL (ref 3.8–5.2)
RBC URINE: 1 /HPF
SP GR UR STRIP: 1.01 (ref 1–1.03)
SQUAMOUS EPITHELIAL: 0 /HPF
TRICYCLICS UR QL SCN: NOT DETECTED
UROBILINOGEN UR STRIP-MCNC: NORMAL MG/DL
WBC # BLD AUTO: 9.3 10E3/UL (ref 4–11)
WBC URINE: 1 /HPF

## 2023-02-10 PROCEDURE — 87340 HEPATITIS B SURFACE AG IA: CPT | Performed by: OBSTETRICS & GYNECOLOGY

## 2023-02-10 PROCEDURE — 36415 COLL VENOUS BLD VENIPUNCTURE: CPT | Performed by: OBSTETRICS & GYNECOLOGY

## 2023-02-10 PROCEDURE — 86901 BLOOD TYPING SEROLOGIC RH(D): CPT | Performed by: OBSTETRICS & GYNECOLOGY

## 2023-02-10 PROCEDURE — 80306 DRUG TEST PRSMV INSTRMNT: CPT | Performed by: OBSTETRICS & GYNECOLOGY

## 2023-02-10 PROCEDURE — 99214 OFFICE O/P EST MOD 30 MIN: CPT | Mod: 25 | Performed by: OBSTETRICS & GYNECOLOGY

## 2023-02-10 PROCEDURE — 87086 URINE CULTURE/COLONY COUNT: CPT | Performed by: OBSTETRICS & GYNECOLOGY

## 2023-02-10 PROCEDURE — 86850 RBC ANTIBODY SCREEN: CPT | Performed by: OBSTETRICS & GYNECOLOGY

## 2023-02-10 PROCEDURE — 86803 HEPATITIS C AB TEST: CPT | Performed by: OBSTETRICS & GYNECOLOGY

## 2023-02-10 PROCEDURE — 87389 HIV-1 AG W/HIV-1&-2 AB AG IA: CPT | Performed by: OBSTETRICS & GYNECOLOGY

## 2023-02-10 PROCEDURE — 86900 BLOOD TYPING SEROLOGIC ABO: CPT | Performed by: OBSTETRICS & GYNECOLOGY

## 2023-02-10 PROCEDURE — 85027 COMPLETE CBC AUTOMATED: CPT | Performed by: OBSTETRICS & GYNECOLOGY

## 2023-02-10 PROCEDURE — 76817 TRANSVAGINAL US OBSTETRIC: CPT | Performed by: OBSTETRICS & GYNECOLOGY

## 2023-02-10 PROCEDURE — 86762 RUBELLA ANTIBODY: CPT | Performed by: OBSTETRICS & GYNECOLOGY

## 2023-02-10 PROCEDURE — 81001 URINALYSIS AUTO W/SCOPE: CPT | Mod: 59 | Performed by: OBSTETRICS & GYNECOLOGY

## 2023-02-10 PROCEDURE — 86780 TREPONEMA PALLIDUM: CPT | Performed by: OBSTETRICS & GYNECOLOGY

## 2023-02-10 ASSESSMENT — PAIN SCALES - GENERAL: PAINLEVEL: NO PAIN (0)

## 2023-02-11 LAB — BACTERIA UR CULT: NORMAL

## 2023-02-11 NOTE — PROGRESS NOTES
"  HPI:  Destini Tee is a 32 year old female  Patient's last menstrual period was 2022 (exact date). at Unknown, Estimated Date of Delivery: Data Unavailable.  She denies vaginal bleeding, nausea , vomiting and abdominal pain.   No other c/o.    No past medical history on file.     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     Past Surgical History:   Procedure Laterality Date      SECTION  2022     LAPAROSCOPIC CHOLECYSTECTOMY N/A 2022    Procedure: CHOLECYSTECTOMY, LAPAROSCOPIC;  Surgeon: Amauri Francisco MD;  Location: [facility] OR       Allergies: No known drug allergy     ROS:   Denies fever, wt loss   Neg /GI other than per HPI      EXAM:  Blood pressure 110/68, pulse 90, resp. rate 16, height 1.702 m (5' 7\"), weight 129.7 kg (286 lb), last menstrual period 2022, SpO2 100 %, not currently breastfeeding.   BMI= Body mass index is 44.79 kg/m .  General - pleasant female in no acute distress.  Abdomen - soft, nontender, nondistended, no hepatosplenomegaly.  Pelvic - EG: normal adult female, BUS: within normal limits,Rectovaginal - deferred.    US:    Transvaginal:Yes  Yolk sac present:Yes  CRL:  22mm  FCA present:Yes  EGA 9w 0d  STIVEN:cwd          ASSESSMENT/PLAN:  (Z34.81) Encounter for supervision of other normal pregnancy in first trimester  (primary encounter diagnosis)  Comment: Viable IUP with concordant dates.   Plan: ABO/Rh type and screen, CBC with platelets,         Hepatitis B surface antigen, Hepatitis C         antibody, HIV Antigen Antibody Combo, Rubella         Antibody IgG, Treponema Abs w Reflex to RPR and        Titer, UA with Microscopic reflex to Culture,         Urine Culture, Urine Drugs of Abuse Screen         Panel 13         Class 3 obesity BMI 44.  Discussed baby ASA second trimester, consider early GTT, possible referral to [facility] for delivery and APFT starting 34-36 wks    1st Trimester precautions and testing discussed.  New OB Labs ordered and " exam scheduled.  Aneuploidy testing options discussed.  Patient has my card and phone number to call prn if problems in interim.    Remy Toney MD

## 2023-02-12 LAB — T PALLIDUM AB SER QL: NONREACTIVE

## 2023-02-13 LAB
HBV SURFACE AG SERPL QL IA: NONREACTIVE
HCV AB SERPL QL IA: NONREACTIVE
HIV 1+2 AB+HIV1 P24 AG SERPL QL IA: NONREACTIVE
RUBV IGG SERPL QL IA: 3.17 INDEX
RUBV IGG SERPL QL IA: POSITIVE

## 2023-02-14 NOTE — PROGRESS NOTES
Have you had or do you currently have:    - Diabetes? N  - Hypertension? N  - Heart disease, mitral valve prolapse, or rheumatic fever?  N  - An autoimmune disease such as lupus or rheumatoid arthritis?  N  - Kidney disease, urinary tract infection?  N  - Epilepsy, seizures, or spells?  N  - Migraine headaches?  N  - Any other neurological problems?  N  - Have you ever been treated for depression?  N  - Are you having problems with crying spells or loss of self-esteem?  N  - Have you ever required psychiatric care?  N  - Have you ever had hepatitis, liver disease, or jaundice?  N  - Have you ever been treated for blood clots in your veins, deep vein thrombosis, inflammation in the veins, thrombosis, phelbitis, pulmonary embolism or varicosities?  N  - Have you had excessive bleeding after surgery or dental work?  N  - Do you bleed more than other women after a cut or scratch?  N  - Do you have a history or anemia?  N  - Have you ever had thyroid problems or take thyroid medication?  N  - Do you have any endocrine problems?  N  - Have you every been in a major accident or suffered serious trauma?  N  - Within the last year, has anyone hit, slapped, kicked, or otherwise hurt you?  N  - In the last year, has anyone forced you to have sex when you didn't want to?  N  - Have you every received a blood transfusion?  N  - Would you refuse a blood transfusion if a doctor judged it to be medically necessary?  N  - Does anyone in your home smoke? N  - Do you use tobacco products?  N  - Do you drink beer, wine, or hard liquor?  N  - Do you use any of the following: marijuana, speed, cocaine, heroin, hallucinogens, or other drugs?  N  - Is your blood type RH negative?  Y  - Have you ever had asthma?  N  - Have you ever had tuberculosis?  N  - Do you have any allergies to drugs or over-the-counter medications?  N  - Allergies: dust mites, aspartame, ethanol, venlafaxine hydrochloride, sertraline?  N  - Have you had any breast  problems?  N  - Have you ever breast-fed?  Y  - Have you had any gynecological surgical procedures such as cervical conization, LEEP, laser treatment, cryosurgery of the cervix, or a dilatation and curettage, etc?  N  - Have you ever had any other surgical procedures?  Y  - Have you ever had any anesthetic complications?  N  - Have you ever had an abnormal pap smear?  Y  - Do you have a history of abnormalities of the uterus? N  - Did your mother take SARBJIT or any other hormones when she was pregnant with you?  N  - Did it take you more than one year to become pregnant?  N  - Have you ever been evaluated or treated for infertility?  N  - Is there a history of medical problems in your family, which you feel might adversely affect your health or pregnancy?  N  - Do you have any other problems we have not asked about which you feel may be important to this pregnancy?  N    Symptoms since last menstrual period  - Do you currently have any of the following symptoms: abdominal pain, blood in the stool or urine, chest pain, shortness of breath, coughing or vomiting up blood, you heart is racing or skipping beats, nausea and vomiting, pain on urination, or vaginal discharge or bleeding?  N    Genetic screening  Has the patient, baby's father, or anyone in either family had:  - Thalassemia (Italian, Greek, Mediterranean, or  background only) and an MCV result less than 80?  N  - Neural tube defect such as meningomyelocele, spina bifida, or anencephaly?  N  - Congential heart defect?  N  - Down's syndrome?  N  - Scout-Sachs disease ( Evangelical, Cajun, Georgian-Obion)?  N  - Sickle cell disease or trait () ?  N  - Hemophilia or other inherited problems of blood?  N  - Muscular dystrophy?  N  - Cystic fibrosis?  N  - Gerry's chorea?  N  - Mental retardation/autism?  N   If yes, was the person tested for Fragile X?  N  - Any other inherited genetic or chromosomal disorder?  N  - Maternal metabolic disorder (e.g.  insulin- dependent diabetes, PKU)?  N  - A child with birth defects not listed above?  N  - Recurrent pregnancy loss, or a stillbirth?  Y  - Has the patient had any medications/street drugs/alcohol since her last menstrual period?  N  - Does the patient or baby's father have any other genetic risk?  N    Infection history  - Have you ever been treated for tuberculosis?  N  - Have you every had a positive skin test for tuberculosis?  N  - Do you live with someone who has tuberculosis?  N  - Have you ever been exposed to tuberculosis?  N  - Do you have genital herpes?  N  - Does your partner have genital herpes?  N  - Have you had a rash or viral illness since your last period?  N  - Have you ever had gonorrhea, chlamydia, syphilis, venereal warts, trichomoniasis, pelvic inflammatory disease, or any other sexually transmitted disease?  N  - Have you had chicken pox?  N  - Have you been vaccinated against chicken pox?  Y  - Have you had any other infectious diseases?  N

## 2023-02-22 DIAGNOSIS — Z34.01 PREGNANCY, SUPERVISION OF FIRST, FIRST TRIMESTER: Primary | ICD-10-CM

## 2023-02-23 ENCOUNTER — LAB (OUTPATIENT)
Dept: LAB | Facility: OTHER | Age: 33
End: 2023-02-23
Payer: COMMERCIAL

## 2023-02-23 DIAGNOSIS — Z34.01 PREGNANCY, SUPERVISION OF FIRST, FIRST TRIMESTER: ICD-10-CM

## 2023-02-23 PROCEDURE — 36415 COLL VENOUS BLD VENIPUNCTURE: CPT

## 2023-02-28 ENCOUNTER — TELEPHONE (OUTPATIENT)
Dept: OBGYN | Facility: OTHER | Age: 33
End: 2023-02-28

## 2023-02-28 ENCOUNTER — PRENATAL OFFICE VISIT (OUTPATIENT)
Dept: OBGYN | Facility: OTHER | Age: 33
End: 2023-02-28
Attending: OBSTETRICS & GYNECOLOGY
Payer: COMMERCIAL

## 2023-02-28 VITALS
HEART RATE: 101 BPM | SYSTOLIC BLOOD PRESSURE: 115 MMHG | WEIGHT: 281 LBS | DIASTOLIC BLOOD PRESSURE: 65 MMHG | OXYGEN SATURATION: 100 % | BODY MASS INDEX: 44.01 KG/M2

## 2023-02-28 DIAGNOSIS — Z34.01 FIRST PREGNANCY, FIRST TRIMESTER: Primary | ICD-10-CM

## 2023-02-28 DIAGNOSIS — O03.9 MISCARRIAGE: ICD-10-CM

## 2023-02-28 DIAGNOSIS — O02.1 MISSED AB: ICD-10-CM

## 2023-02-28 PROCEDURE — 76817 TRANSVAGINAL US OBSTETRIC: CPT | Performed by: OBSTETRICS & GYNECOLOGY

## 2023-02-28 PROCEDURE — 99214 OFFICE O/P EST MOD 30 MIN: CPT | Mod: 25 | Performed by: OBSTETRICS & GYNECOLOGY

## 2023-02-28 ASSESSMENT — PAIN SCALES - GENERAL: PAINLEVEL: NO PAIN (0)

## 2023-02-28 NOTE — TELEPHONE ENCOUNTER
Patient called and is reporting that she passed a large clot last night and than she did not have any more bleeding last night. This morning she is haveing light bleeding that is dark red when she wipes. She is not having and cramping or pain.

## 2023-02-28 NOTE — PROGRESS NOTES
SUBJECTIVE: Destini Tee is a 32 year old female   at 12 weeks gestation.  She presented with VB on two occasions since last night.  Denies pelvic pain/abnormal discharge.  Ultrasound performed here in clinic today confirms missed AB, no fetal heartrate.  Patient  informed of the results. Pt expresses appropriate sadness at loss.  Reassured her that the loss could not have been stopped by her actions or any other persons actions.     No past medical history on file.    Past Surgical History:   Procedure Laterality Date      SECTION  2022     LAPAROSCOPIC CHOLECYSTECTOMY N/A 2022    Procedure: CHOLECYSTECTOMY, LAPAROSCOPIC;  Surgeon: Amauri Francisco MD;  Location: [facility]       Allergies: No known drug allergy     EXAM:  Blood pressure 115/65, pulse 101, weight 127.5 kg (281 lb), last menstrual period 2022, SpO2 100 %, not currently breastfeeding.   BMI= Body mass index is 44.01 kg/m .  General - pleasant female in no acute distress.  Abdomen - soft, nontender, nondistended, no hepatosplenomegaly.  Pelvic - EG: normal adult female, BUS: within normal limits,Rectovaginal - deferred.    US:    Transvaginal:Yes    CRL:  24mm  FCA absent  EGA 9w 1d        ASSESSMENT: Missed AB    PLAN: Discussed grief, causes of miscarriage, and pregnancy loss info given to patient.  Discussed expectations of bleeding and options and indications for suction currettage in relation to miscarriage.  We discussed expectant and medical management options as well.  She understands that nothing can be done to prevent a miscarriage from occuring.  She was instructed to call or present to the emergency room if she were to develop heavy bleeding saturating a maxi pad more frequently than every hour or passing large clots.   Follow up in 1-2 weeks or sooner prn if problems.  Patient has my card and phone number to call if she wishes to proceed with D and C or medical management.   30 minutes were spent on the  patient visit in face-to-face counseling, review or records, charting,  and coordination of care.      Blood type RH neg, RHIG done    Remy Toney MD

## 2023-03-01 ENCOUNTER — TELEPHONE (OUTPATIENT)
Dept: OBGYN | Facility: OTHER | Age: 33
End: 2023-03-01

## 2023-03-01 ENCOUNTER — PREP FOR PROCEDURE (OUTPATIENT)
Dept: OBGYN | Facility: OTHER | Age: 33
End: 2023-03-01

## 2023-03-01 ENCOUNTER — ANESTHESIA EVENT (OUTPATIENT)
Dept: SURGERY | Facility: HOSPITAL | Age: 33
End: 2023-03-01
Payer: COMMERCIAL

## 2023-03-01 DIAGNOSIS — O02.1 MISSED ABORTION: Primary | ICD-10-CM

## 2023-03-01 NOTE — TELEPHONE ENCOUNTER
Pt requesting soap be sent to [facility] for  tomorrow. Sandrita will bring soap tomorrow and leave at Registration Desk.   Roxana Do RN

## 2023-03-01 NOTE — TELEPHONE ENCOUNTER
3/1/2023 10:58 AM  Patient left VM regarding D&C pt stated she would like to get that surgery scheduled for Friday. Writer waiting for Dr. Toney to return from surgery to discuss plan.  Roxana Do RN

## 2023-03-02 NOTE — ANESTHESIA PREPROCEDURE EVALUATION
Anesthesia Pre-Procedure Evaluation    Patient: Destini Tee   MRN: [id number] : 1990        Procedure : Procedure(s):  CHOLECYSTECTOMY, LAPAROSCOPIC          No past medical history on file.   Past Surgical History:   Procedure Laterality Date      SECTION  2022     LAPAROSCOPIC CHOLECYSTECTOMY N/A 2022    Procedure: CHOLECYSTECTOMY, LAPAROSCOPIC;  Surgeon: Amauri Francisco MD;  Location: [facility]      Allergies   Allergen Reactions     No Known Drug Allergy       Social History     Tobacco Use     Smoking status: Never     Smokeless tobacco: Never   Substance Use Topics     Alcohol use: Yes     Alcohol/week: 0.8 standard drinks     Types: 1 Standard drinks or equivalent per week     Comment: occasional      Wt Readings from Last 1 Encounters:   23 127.5 kg (281 lb)        Anesthesia Evaluation   Pt has had prior anesthetic. Type: Regional and General.        ROS/MED HX  ENT/Pulmonary:     (+) SHERRY risk factors, obese,     Neurologic:       Cardiovascular:  - neg cardiovascular ROS     METS/Exercise Tolerance: >4 METS    Hematologic:  - neg hematologic  ROS     Musculoskeletal:  - neg musculoskeletal ROS     GI/Hepatic:  - neg GI/hepatic ROS     Renal/Genitourinary:  - neg Renal ROS     Endo:     (+) Obesity,     Psychiatric/Substance Use:  - neg psychiatric ROS     Infectious Disease:       Malignancy:  - neg malignancy ROS     Other:      (+) Possibly pregnant, LMP: LMP 22;  at 12w, missed , no fetal heartrate, ,         Physical Exam    Airway  airway exam normal      Mallampati: II   TM distance: > 3 FB   Neck ROM: full   Mouth opening: > 3 cm    Respiratory Devices and Support         Dental  no notable dental history     (+) Completely normal teeth      Cardiovascular   cardiovascular exam normal       Rhythm and rate: regular and normal     Pulmonary   pulmonary exam normal        breath sounds clear to auscultation           OUTSIDE LABS:  CBC:   Lab Results    Component Value Date    WBC 9.3 02/10/2023    WBC 5.3 09/02/2022    HGB 13.5 02/10/2023    HGB 13.5 09/02/2022    HCT 40.0 02/10/2023    HCT 41.2 09/02/2022     02/10/2023     09/02/2022     BMP:   Lab Results   Component Value Date     09/02/2022     08/09/2022    POTASSIUM 3.7 09/02/2022    POTASSIUM 3.6 08/09/2022    CHLORIDE 107 09/02/2022    CHLORIDE 106 08/09/2022    CO2 30 09/02/2022    CO2 29 08/09/2022    BUN 6 (L) 09/02/2022    BUN 8 08/09/2022    CR 0.70 09/02/2022    CR 0.58 08/09/2022    GLC 77 09/02/2022    GLC 94 08/09/2022     COAGS: No results found for: PTT, INR, FIBR  POC:   Lab Results   Component Value Date    HCG Negative 09/06/2022     HEPATIC:   Lab Results   Component Value Date    ALBUMIN 3.1 (L) 08/09/2022    PROTTOTAL 7.4 08/09/2022    ALT 49 08/09/2022    AST 39 08/09/2022    ALKPHOS 155 (H) 08/09/2022    BILITOTAL 0.7 08/09/2022     OTHER:   Lab Results   Component Value Date    A1C 5.1 03/19/2015    YOHANNES 8.9 09/02/2022    TSH 1.92 11/05/2021       Anesthesia Plan    ASA Status:  3   NPO Status:  NPO Appropriate    Anesthesia Type: MAC.     - Reason for MAC: straight local not clinically adequate              Consents    Anesthesia Plan(s) and associated risks, benefits, and realistic alternatives discussed. Questions answered and patient/representative(s) expressed understanding.     - Discussed: Risks, Benefits and Alternatives for BOTH SEDATION and the PROCEDURE were discussed     - Discussed with:  Patient      - Extended Intubation/Ventilatory Support Discussed: No.      - Patient is DNR/DNI Status: No    Use of blood products discussed: No .     Postoperative Care    Pain management: IV analgesics.   PONV prophylaxis: Ondansetron (or other 5HT-3)     Comments:    Other Comments: Risks and benefits of MAC anesthetic discussed including dental damage, aspiration, loss of airway, conversion to general anesthetic, CV complications, MI, stroke, death. Pt  wishes to proceed.                 ROSITA Chicas CRNA

## 2023-03-03 ENCOUNTER — HOSPITAL ENCOUNTER (OUTPATIENT)
Facility: HOSPITAL | Age: 33
Discharge: HOME | End: 2023-03-03
Attending: OBSTETRICS & GYNECOLOGY | Admitting: OBSTETRICS & GYNECOLOGY
Payer: COMMERCIAL

## 2023-03-03 ENCOUNTER — ANESTHESIA (OUTPATIENT)
Dept: SURGERY | Facility: HOSPITAL | Age: 33
End: 2023-03-03
Payer: COMMERCIAL

## 2023-03-03 VITALS
TEMPERATURE: 98 F | SYSTOLIC BLOOD PRESSURE: 131 MMHG | OXYGEN SATURATION: 96 % | WEIGHT: 282 LBS | HEART RATE: 86 BPM | BODY MASS INDEX: 44.26 KG/M2 | RESPIRATION RATE: 16 BRPM | HEIGHT: 67 IN | DIASTOLIC BLOOD PRESSURE: 78 MMHG

## 2023-03-03 DIAGNOSIS — Z98.890 POST-OPERATIVE STATE: Primary | ICD-10-CM

## 2023-03-03 PROCEDURE — 88305 TISSUE EXAM BY PATHOLOGIST: CPT | Mod: 26 | Performed by: PATHOLOGY

## 2023-03-03 PROCEDURE — 360N000076 HC SURGERY LEVEL 3, PER MIN: Performed by: OBSTETRICS & GYNECOLOGY

## 2023-03-03 PROCEDURE — 272N000001 HC OR GENERAL SUPPLY STERILE: Performed by: OBSTETRICS & GYNECOLOGY

## 2023-03-03 PROCEDURE — 250N000009 HC RX 250: Performed by: OBSTETRICS & GYNECOLOGY

## 2023-03-03 PROCEDURE — 370N000017 HC ANESTHESIA TECHNICAL FEE, PER MIN: Performed by: OBSTETRICS & GYNECOLOGY

## 2023-03-03 PROCEDURE — 258N000003 HC RX IP 258 OP 636: Performed by: NURSE ANESTHETIST, CERTIFIED REGISTERED

## 2023-03-03 PROCEDURE — 250N000011 HC RX IP 250 OP 636: Performed by: NURSE ANESTHETIST, CERTIFIED REGISTERED

## 2023-03-03 PROCEDURE — 250N000009 HC RX 250: Performed by: NURSE ANESTHETIST, CERTIFIED REGISTERED

## 2023-03-03 PROCEDURE — 250N000011 HC RX IP 250 OP 636: Performed by: OBSTETRICS & GYNECOLOGY

## 2023-03-03 PROCEDURE — 57500 BIOPSY OF CERVIX: CPT | Performed by: NURSE ANESTHETIST, CERTIFIED REGISTERED

## 2023-03-03 PROCEDURE — 57500 BIOPSY OF CERVIX: CPT | Performed by: OBSTETRICS & GYNECOLOGY

## 2023-03-03 PROCEDURE — 250N000013 HC RX MED GY IP 250 OP 250 PS 637: Performed by: OBSTETRICS & GYNECOLOGY

## 2023-03-03 PROCEDURE — 710N000012 HC RECOVERY PHASE 2, PER MINUTE: Performed by: OBSTETRICS & GYNECOLOGY

## 2023-03-03 PROCEDURE — 999N000141 HC STATISTIC PRE-PROCEDURE NURSING ASSESSMENT: Performed by: OBSTETRICS & GYNECOLOGY

## 2023-03-03 PROCEDURE — 88305 TISSUE EXAM BY PATHOLOGIST: CPT | Mod: TC | Performed by: OBSTETRICS & GYNECOLOGY

## 2023-03-03 RX ORDER — DOXYCYCLINE 100 MG/10ML
100 INJECTION, POWDER, LYOPHILIZED, FOR SOLUTION INTRAVENOUS
Status: COMPLETED | OUTPATIENT
Start: 2023-03-03 | End: 2023-03-03

## 2023-03-03 RX ORDER — PROPOFOL 10 MG/ML
INJECTION, EMULSION INTRAVENOUS PRN
Status: DISCONTINUED | OUTPATIENT
Start: 2023-03-03 | End: 2023-03-03

## 2023-03-03 RX ORDER — HYDROXYZINE HYDROCHLORIDE 25 MG/1
50 TABLET, FILM COATED ORAL
Status: CANCELLED | OUTPATIENT
Start: 2023-03-03

## 2023-03-03 RX ORDER — LIDOCAINE HYDROCHLORIDE 10 MG/ML
INJECTION, SOLUTION EPIDURAL; INFILTRATION; INTRACAUDAL; PERINEURAL PRN
Status: DISCONTINUED | OUTPATIENT
Start: 2023-03-03 | End: 2023-03-03 | Stop reason: HOSPADM

## 2023-03-03 RX ORDER — ONDANSETRON 2 MG/ML
INJECTION INTRAMUSCULAR; INTRAVENOUS PRN
Status: DISCONTINUED | OUTPATIENT
Start: 2023-03-03 | End: 2023-03-03

## 2023-03-03 RX ORDER — IBUPROFEN 800 MG/1
800 TABLET, FILM COATED ORAL EVERY 8 HOURS PRN
Qty: 30 TABLET | Refills: 1 | Status: SHIPPED | OUTPATIENT
Start: 2023-03-03 | End: 2023-03-17

## 2023-03-03 RX ORDER — KETAMINE HYDROCHLORIDE 10 MG/ML
INJECTION INTRAMUSCULAR; INTRAVENOUS PRN
Status: DISCONTINUED | OUTPATIENT
Start: 2023-03-03 | End: 2023-03-03

## 2023-03-03 RX ORDER — FENTANYL CITRATE 50 UG/ML
INJECTION, SOLUTION INTRAMUSCULAR; INTRAVENOUS PRN
Status: DISCONTINUED | OUTPATIENT
Start: 2023-03-03 | End: 2023-03-03

## 2023-03-03 RX ORDER — LIDOCAINE 40 MG/G
CREAM TOPICAL
Status: DISCONTINUED | OUTPATIENT
Start: 2023-03-03 | End: 2023-03-03 | Stop reason: HOSPADM

## 2023-03-03 RX ORDER — ONDANSETRON 4 MG/1
4 TABLET, ORALLY DISINTEGRATING ORAL
Status: CANCELLED | OUTPATIENT
Start: 2023-03-03

## 2023-03-03 RX ORDER — NALOXONE HYDROCHLORIDE 0.4 MG/ML
0.2 INJECTION, SOLUTION INTRAMUSCULAR; INTRAVENOUS; SUBCUTANEOUS
Status: DISCONTINUED | OUTPATIENT
Start: 2023-03-03 | End: 2023-03-03 | Stop reason: HOSPADM

## 2023-03-03 RX ORDER — DEXAMETHASONE SODIUM PHOSPHATE 4 MG/ML
INJECTION, SOLUTION INTRA-ARTICULAR; INTRALESIONAL; INTRAMUSCULAR; INTRAVENOUS; SOFT TISSUE PRN
Status: DISCONTINUED | OUTPATIENT
Start: 2023-03-03 | End: 2023-03-03

## 2023-03-03 RX ORDER — ACETAMINOPHEN 325 MG/1
975 TABLET ORAL ONCE
Status: COMPLETED | OUTPATIENT
Start: 2023-03-03 | End: 2023-03-03

## 2023-03-03 RX ORDER — NALOXONE HYDROCHLORIDE 0.4 MG/ML
0.4 INJECTION, SOLUTION INTRAMUSCULAR; INTRAVENOUS; SUBCUTANEOUS
Status: DISCONTINUED | OUTPATIENT
Start: 2023-03-03 | End: 2023-03-03 | Stop reason: HOSPADM

## 2023-03-03 RX ORDER — SODIUM CHLORIDE, SODIUM LACTATE, POTASSIUM CHLORIDE, CALCIUM CHLORIDE 600; 310; 30; 20 MG/100ML; MG/100ML; MG/100ML; MG/100ML
INJECTION, SOLUTION INTRAVENOUS CONTINUOUS
Status: DISCONTINUED | OUTPATIENT
Start: 2023-03-03 | End: 2023-03-03 | Stop reason: HOSPADM

## 2023-03-03 RX ORDER — FENTANYL CITRATE 50 UG/ML
25 INJECTION, SOLUTION INTRAMUSCULAR; INTRAVENOUS
Status: DISCONTINUED | OUTPATIENT
Start: 2023-03-03 | End: 2023-03-03 | Stop reason: HOSPADM

## 2023-03-03 RX ORDER — METHYLERGONOVINE MALEATE 0.2 MG/ML
INJECTION INTRAVENOUS PRN
Status: DISCONTINUED | OUTPATIENT
Start: 2023-03-03 | End: 2023-03-03

## 2023-03-03 RX ORDER — MISOPROSTOL 200 UG/1
200 TABLET ORAL ONCE
Status: COMPLETED | OUTPATIENT
Start: 2023-03-03 | End: 2023-03-03

## 2023-03-03 RX ORDER — KETOROLAC TROMETHAMINE 30 MG/ML
INJECTION, SOLUTION INTRAMUSCULAR; INTRAVENOUS PRN
Status: DISCONTINUED | OUTPATIENT
Start: 2023-03-03 | End: 2023-03-03

## 2023-03-03 RX ORDER — LIDOCAINE HYDROCHLORIDE 10 MG/ML
INJECTION, SOLUTION EPIDURAL; INFILTRATION; INTRACAUDAL; PERINEURAL
Status: DISCONTINUED
Start: 2023-03-03 | End: 2023-03-03 | Stop reason: HOSPADM

## 2023-03-03 RX ORDER — OXYCODONE HYDROCHLORIDE 5 MG/1
5 TABLET ORAL
Status: CANCELLED | OUTPATIENT
Start: 2023-03-03

## 2023-03-03 RX ORDER — GLYCOPYRROLATE 0.2 MG/ML
INJECTION, SOLUTION INTRAMUSCULAR; INTRAVENOUS PRN
Status: DISCONTINUED | OUTPATIENT
Start: 2023-03-03 | End: 2023-03-03

## 2023-03-03 RX ADMIN — Medication 10 MG: at 12:52

## 2023-03-03 RX ADMIN — TRANEXAMIC ACID 1 G: 1 INJECTION, SOLUTION INTRAVENOUS at 13:12

## 2023-03-03 RX ADMIN — PROPOFOL 50 MG: 10 INJECTION, EMULSION INTRAVENOUS at 13:16

## 2023-03-03 RX ADMIN — GLYCOPYRROLATE 0.2 MG: 0.2 INJECTION, SOLUTION INTRAMUSCULAR; INTRAVENOUS at 12:58

## 2023-03-03 RX ADMIN — ACETAMINOPHEN 975 MG: 325 TABLET ORAL at 11:35

## 2023-03-03 RX ADMIN — Medication 10 MG: at 12:59

## 2023-03-03 RX ADMIN — FENTANYL CITRATE 50 MCG: 50 INJECTION, SOLUTION INTRAMUSCULAR; INTRAVENOUS at 12:48

## 2023-03-03 RX ADMIN — DOXYCYCLINE 100 MG: 100 INJECTION, POWDER, LYOPHILIZED, FOR SOLUTION INTRAVENOUS at 11:52

## 2023-03-03 RX ADMIN — PROPOFOL 50 MG: 10 INJECTION, EMULSION INTRAVENOUS at 12:57

## 2023-03-03 RX ADMIN — PROPOFOL 50 MG: 10 INJECTION, EMULSION INTRAVENOUS at 13:12

## 2023-03-03 RX ADMIN — ONDANSETRON 4 MG: 2 INJECTION INTRAMUSCULAR; INTRAVENOUS at 12:58

## 2023-03-03 RX ADMIN — SODIUM CHLORIDE, POTASSIUM CHLORIDE, SODIUM LACTATE AND CALCIUM CHLORIDE: 600; 310; 30; 20 INJECTION, SOLUTION INTRAVENOUS at 11:56

## 2023-03-03 RX ADMIN — MISOPROSTOL 200 MCG: 200 TABLET ORAL at 11:35

## 2023-03-03 RX ADMIN — FENTANYL CITRATE 50 MCG: 50 INJECTION, SOLUTION INTRAMUSCULAR; INTRAVENOUS at 13:04

## 2023-03-03 RX ADMIN — PROPOFOL 50 MG: 10 INJECTION, EMULSION INTRAVENOUS at 13:08

## 2023-03-03 RX ADMIN — Medication 10 MG: at 13:05

## 2023-03-03 RX ADMIN — FENTANYL CITRATE 50 MCG: 50 INJECTION, SOLUTION INTRAMUSCULAR; INTRAVENOUS at 12:53

## 2023-03-03 RX ADMIN — PROPOFOL 50 MG: 10 INJECTION, EMULSION INTRAVENOUS at 13:04

## 2023-03-03 RX ADMIN — DEXAMETHASONE SODIUM PHOSPHATE 6 MG: 4 INJECTION, SOLUTION INTRA-ARTICULAR; INTRALESIONAL; INTRAMUSCULAR; INTRAVENOUS; SOFT TISSUE at 13:00

## 2023-03-03 RX ADMIN — PROPOFOL 50 MG: 10 INJECTION, EMULSION INTRAVENOUS at 12:52

## 2023-03-03 RX ADMIN — KETOROLAC TROMETHAMINE 30 MG: 30 INJECTION, SOLUTION INTRAMUSCULAR at 13:17

## 2023-03-03 RX ADMIN — MIDAZOLAM 2 MG: 1 INJECTION INTRAMUSCULAR; INTRAVENOUS at 12:48

## 2023-03-03 RX ADMIN — METHYLERGONOVINE MALEATE 200 MCG: 0.2 INJECTION INTRAVENOUS at 13:12

## 2023-03-03 RX ADMIN — PROPOFOL 50 MG: 10 INJECTION, EMULSION INTRAVENOUS at 13:01

## 2023-03-03 RX ADMIN — FENTANYL CITRATE 50 MCG: 50 INJECTION, SOLUTION INTRAMUSCULAR; INTRAVENOUS at 13:00

## 2023-03-03 RX ADMIN — Medication 10 MG: at 13:03

## 2023-03-03 RX ADMIN — Medication 10 MG: at 12:56

## 2023-03-03 RX ADMIN — PROPOFOL 50 MG: 10 INJECTION, EMULSION INTRAVENOUS at 12:54

## 2023-03-03 ASSESSMENT — ACTIVITIES OF DAILY LIVING (ADL)
ADLS_ACUITY_SCORE: 35
ADLS_ACUITY_SCORE: 33

## 2023-03-03 NOTE — H&P
CC:  preop Suction D and C  HPI: Destini Tee is a 32 year old female  P1 (CS) Prior clinic notes from 3/1/23 are reviewed at today's visit.         History reviewed. No pertinent past medical history.    Past Surgical History:   Procedure Laterality Date      SECTION  2022     LAPAROSCOPIC CHOLECYSTECTOMY N/A 2022    Procedure: CHOLECYSTECTOMY, LAPAROSCOPIC;  Surgeon: Amauri Francisco MD;  Location: [facility]       Family History   Problem Relation Age of Onset     Celiac Disease Mother      Cancer Maternal Grandmother      Cancer Maternal Grandfather         liver     Heart Disease Paternal Grandmother      Heart Disease Paternal Grandfather      Breast Cancer No family hx of      Colon Cancer No family hx of      No current outpatient medications on file.     Social History     Socioeconomic History     Marital status:      Spouse name: None     Number of children: None     Years of education: None     Highest education level: None   Tobacco Use     Smoking status: Never     Smokeless tobacco: Never   Vaping Use     Vaping Use: Never used   Substance and Sexual Activity     Alcohol use: Yes     Alcohol/week: 0.8 standard drinks     Types: 1 Standard drinks or equivalent per week     Comment: occasional     Drug use: No     Sexual activity: Yes     Partners: Male     Birth control/protection: Implant   Other Topics Concern     Blood Transfusions No     Comment: PERMITS     Exercise No     Seat Belt Yes   Social History Narrative    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 [employer]        2019: lives in [place], works in the lab, no children, has boyfriend       Allergies: No known drug allergy    ROS:  CONSTITUTIONAL: NEGATIVE for fever, chills,   ENT/MOUTH: NEGATIVE for ear, mouth and throat problems  RESP: NEGATIVE for significant cough or SOB  CV: NEGATIVE for chest pain, palpitations or peripheral edema  GI: NEGATIVE for nausea, abdominal pain, heartburn, or change in bowel habits  :  "NEGATIVE for frequency, dysuria, hematuria, vaginal discharge  MUSCULOSKELETAL: NEGATIVE for significant arthralgias or myalgia  NEURO: NEGATIVE for weakness, dizziness or paresthesias    EXAM:  Blood pressure 120/84, pulse 89, temperature 97.4  F (36.3  C), temperature source Tympanic, resp. rate 16, height 1.702 m (5' 7\"), weight 127.9 kg (282 lb), last menstrual period 12/06/2022, SpO2 100 %, not currently breastfeeding.   BMI= Body mass index is 44.17 kg/m .  General - pleasant female in no acute distress.  Neck - supple without lymphadenopathy or thyromegaly.  Lungs - clear to auscultation bilaterally.  Heart - regular rate and rhythm without murmur.  Abdomen - soft, nontender, nondistended, no hepatosplenomegaly.  Rectovaginal - deferred.  Musculoskeletal - no gross deformities or edmema  Neurological - normal strength, sensation, and mental status.            ASSESSMENT/PLAN:    Surgery is scheduled for today and will proceed as planned. No additional risk factors or testing needed.  Reviewed goals, risks, alternatives for planned procedure.  Including risk of bleeding, infection, damage to nerves, blood vessels, bowel and bladder. Discussed recovery period and expected discomfort.. All questions were answered. Consent form reviewed and signed. Satisfactory medical condition for proposed procedure.         Remy Toney MD  "

## 2023-03-03 NOTE — OR NURSING
Patient ready to go home.  VSS; patient eating and drinking without any difficulties.  Patient denies any pain.  Sole-pad remains dry without any signs of bleeding noted.  Spoke with patient's  over the phone and gave discharge instructions.  Both patient and  verbalize understanding and have no further questions or concerns at this time.  Patient's IV was removed.  She was able to ambulate to the bathroom to void-denies any difficulties voiding.  Patient dressed and escorted to [facility] where her  picked her up.  Home.

## 2023-03-03 NOTE — OP NOTE
[facility]      Pre-operative diagnosis: Missed AB, 9 weeks gestation   Post-operative diagnosis: Same   Procedure: Suction Dilatation and Curettage  Intraoperative Ultrasound.    Surgeon: Remy Toney MD   Assistant(s): None   Anesthesia: MAC (monitored anesthesia care) and Paracervical block:  2% plain Lidocaine   Estimated blood loss: less than 50ml   Total IV fluids: (See anesthesia record)   Blood transfusion: No transfusion was given during surgery   Total urine output: (See anesthesia record)   Drains: None   Specimens: Products of conception     OPERATIVE FINDINGS:  Products of conception.  Path pending  OPERATIVE DICTATION: The patient was brought to the operating room and uneventfully placed under monitored IV anesthesia. She was prepped and draped in the dorsal lithotomy position and her bladder drained. The cervix was visualized with a weighted speculum and grasped anteriorly with a fine-toothed tenaculum.  A paracervical and cervical block was performed lidocaine.  The cervix was gently dilated with Hegar dilators and admitted a number 11 curved suction curette. Suction was applied and the uterine contents evacuated over 3 consecutive passes.   Gentle but thorough sharp curettage was performed in all 4 quadrants to ensure completion.  At this point there was excellent hemostasis. The instruments were removed. There were no complications and the patient was transferred to the recovery room in excellent stable condition.       Remy Toney MD

## 2023-03-03 NOTE — ANESTHESIA POSTPROCEDURE EVALUATION
Patient: Destini Tee    Procedure: Procedure(s):  Suction Dilation and Curettage       Anesthesia Type:  MAC    Note:  Disposition: Outpatient   Postop Pain Control: Uneventful            Sign Out: Well controlled pain   PONV: No   Neuro/Psych: Uneventful            Sign Out: Acceptable/Baseline neuro status   Airway/Respiratory: Uneventful            Sign Out: Acceptable/Baseline resp. status   CV/Hemodynamics: Uneventful            Sign Out: Acceptable CV status; No obvious hypovolemia; No obvious fluid overload   Other NRE: NONE   DID A NON-ROUTINE EVENT OCCUR? No           Last vitals:  Vitals Value Taken Time   /78 03/03/23 1345   Temp 97.7  F (36.5  C) 03/03/23 1325   Pulse 86 03/03/23 1345   Resp 14 03/03/23 1325   SpO2 99 % 03/03/23 1353   Vitals shown include unvalidated device data.    Electronically Signed By: ROSITA Pugh CRNA  March 3, 2023  1:53 PM

## 2023-03-03 NOTE — DISCHARGE INSTRUCTIONS
Call MD prior to DC.  No driving today or while on pain medications  May shower starting day after surgery.    Pelvic rest for 2 weeks  Call Dr. Toney [phone] as necessary if problems  Schedule miscarriage/post op check 2-3 weeks Dr. Toney

## 2023-03-03 NOTE — ANESTHESIA CARE TRANSFER NOTE
Patient: Destini Tee    Procedure: Procedure(s):  Suction Dilation and Curettage       Diagnosis: Missed  [O02.1]  Diagnosis Additional Information: No value filed.    Anesthesia Type:   MAC     Note:    Oropharynx: oropharynx clear of all foreign objects  Level of Consciousness: drowsy  Oxygen Supplementation: nasal cannula  Level of Supplemental Oxygen (L/min / FiO2): 2  Independent Airway: airway patency satisfactory and stable  Dentition: dentition unchanged  Vital Signs Stable: post-procedure vital signs reviewed and stable  Report to RN Given: handoff report given  Patient transferred to: Phase II    Handoff Report: Identifed the Patient, Identified the Reponsible Provider, Reviewed the pertinent medical history, Discussed the surgical course, Reviewed Intra-OP anesthesia mangement and issues during anesthesia, Set expectations for post-procedure period and Allowed opportunity for questions and acknowledgement of understanding      Vitals:  Vitals Value Taken Time   /74 23 1325   Temp     Pulse 92 23 1325   Resp     SpO2 99 % 23 1327   Vitals shown include unvalidated device data.    Electronically Signed By: ROSITA Pugh CRNA  March 3, 2023  1:28 PM

## 2023-03-06 LAB — SCANNED LAB RESULT: NORMAL

## 2023-03-17 ENCOUNTER — OFFICE VISIT (OUTPATIENT)
Dept: OBGYN | Facility: OTHER | Age: 33
End: 2023-03-17
Attending: OBSTETRICS & GYNECOLOGY
Payer: COMMERCIAL

## 2023-03-17 VITALS
DIASTOLIC BLOOD PRESSURE: 74 MMHG | BODY MASS INDEX: 44.53 KG/M2 | SYSTOLIC BLOOD PRESSURE: 127 MMHG | WEIGHT: 283.7 LBS | TEMPERATURE: 98.6 F | HEIGHT: 67 IN | HEART RATE: 75 BPM | OXYGEN SATURATION: 98 %

## 2023-03-17 DIAGNOSIS — Z98.890 POST-OPERATIVE STATE: Primary | ICD-10-CM

## 2023-03-17 PROCEDURE — 99024 POSTOP FOLLOW-UP VISIT: CPT | Performed by: OBSTETRICS & GYNECOLOGY

## 2023-03-17 ASSESSMENT — PAIN SCALES - GENERAL: PAINLEVEL: NO PAIN (0)

## 2023-03-17 NOTE — PROGRESS NOTES
"ADRIANA Tee is a 32 year old female presents for post operative check. She is  2  week(s) status post  Suction D and C for missed AB.  She reports doing well and denies significant pain/abnormal discharge.  Slight intermittent bleeding.  Bowel and bladder function is satisfactory.    O.  Blood pressure 127/74, pulse 75, temperature 98.6  F (37  C), temperature source Tympanic, height 1.702 m (5' 7\"), weight 128.7 kg (283 lb 11.2 oz), last menstrual period 12/06/2022, SpO2 98 %, not currently breastfeeding.    Abd: soft, non-tender, non-distended.    A. /P. Satisfactory post-op check.Released from restrictions.  Continue PNV, await resumption of normal menses prior to conceiving and healthy lifestyle measures.     F/u prn problems or at next annual examination, or call with + pregnancy. .    Remy Toney MD  "

## 2023-04-30 ENCOUNTER — HEALTH MAINTENANCE LETTER (OUTPATIENT)
Age: 33
End: 2023-04-30

## 2023-07-25 ENCOUNTER — PRENATAL OFFICE VISIT (OUTPATIENT)
Dept: OBGYN | Facility: OTHER | Age: 33
End: 2023-07-25
Attending: OBSTETRICS & GYNECOLOGY
Payer: COMMERCIAL

## 2023-07-25 VITALS
SYSTOLIC BLOOD PRESSURE: 100 MMHG | WEIGHT: 286 LBS | DIASTOLIC BLOOD PRESSURE: 60 MMHG | HEART RATE: 87 BPM | BODY MASS INDEX: 44.79 KG/M2 | OXYGEN SATURATION: 99 %

## 2023-07-25 DIAGNOSIS — E66.813 CLASS 3 SEVERE OBESITY DUE TO EXCESS CALORIES WITHOUT SERIOUS COMORBIDITY WITH BODY MASS INDEX (BMI) OF 45.0 TO 49.9 IN ADULT (H): ICD-10-CM

## 2023-07-25 DIAGNOSIS — E66.01 CLASS 3 SEVERE OBESITY DUE TO EXCESS CALORIES WITHOUT SERIOUS COMORBIDITY WITH BODY MASS INDEX (BMI) OF 45.0 TO 49.9 IN ADULT (H): ICD-10-CM

## 2023-07-25 DIAGNOSIS — O34.219 PREVIOUS CESAREAN DELIVERY, ANTEPARTUM CONDITION OR COMPLICATION: ICD-10-CM

## 2023-07-25 DIAGNOSIS — Z34.91 PREGNANCY WITH UNCERTAIN DATES IN FIRST TRIMESTER: ICD-10-CM

## 2023-07-25 DIAGNOSIS — O09.299 H/O MISCARRIAGE, CURRENTLY PREGNANT: ICD-10-CM

## 2023-07-25 DIAGNOSIS — Z32.01 POSITIVE PREGNANCY TEST: Primary | ICD-10-CM

## 2023-07-25 PROCEDURE — 99207 PR FIRST OB VISIT: CPT | Performed by: OBSTETRICS & GYNECOLOGY

## 2023-07-25 PROCEDURE — 76817 TRANSVAGINAL US OBSTETRIC: CPT | Performed by: OBSTETRICS & GYNECOLOGY

## 2023-07-25 ASSESSMENT — PAIN SCALES - GENERAL: PAINLEVEL: NO PAIN (0)

## 2023-07-26 NOTE — PROGRESS NOTES
HPI:  Destini Tee is a 32 year old female  No LMP recorded. at Unknown, Estimated Date of Delivery: Data Unavailable.  She denies vaginal bleeding, nausea , vomiting, and abdominal pain.   No other c/o.  On PNV.    No past medical history on file.    Past Surgical History:   Procedure Laterality Date     SECTION  2022    DILATION AND CURETTAGE SUCTION N/A 3/3/2023    Procedure: Suction Dilation and Curettage;  Surgeon: Remy Toney MD;  Location: [facility]    LAPAROSCOPIC CHOLECYSTECTOMY N/A 2022    Procedure: CHOLECYSTECTOMY, LAPAROSCOPIC;  Surgeon: Amauri Francisco MD;  Location: [facility]       Allergies: No known drug allergy     ROS:   Denies fever, wt loss   Neg /GI other than per HPI      EXAM:  Blood pressure 100/60, pulse 87, weight 129.7 kg (286 lb), SpO2 99 %, not currently breastfeeding.   BMI= Body mass index is 44.79 kg/m .  General - pleasant female in no acute distress.  Abdomen - soft, nontender, nondistended, no hepatosplenomegaly.  Pelvic - EG: normal adult female, BUS: within normal limits,Rectovaginal - deferred.    US:  Exam technically difficult due to body habitus  Transvaginal:Yes  Yolk sac present:Yes  CRL:  18mm  FCA present:Yes  EGA 8w 2d  STIVEN:CWD          ASSESSMENT/PLAN:  Viable IUP with concordant dates, h/o miscarriage.    Plan: US OB < 14 Weeks Single (Capay) Formal Rad in 1-2 weeks for continued viability/dates.     1st Trimester precautions and testing discussed.  F/up appt with me in 3-4 weeks.  New OB labs at that time. Patient has my card and phone number to call prn if problems in interim.    Remy Toney MD

## 2023-08-09 ENCOUNTER — HOSPITAL ENCOUNTER (OUTPATIENT)
Dept: ULTRASOUND IMAGING | Facility: HOSPITAL | Age: 33
Discharge: HOME | End: 2023-08-09
Attending: OBSTETRICS & GYNECOLOGY | Admitting: OBSTETRICS & GYNECOLOGY
Payer: COMMERCIAL

## 2023-08-09 DIAGNOSIS — Z32.01 POSITIVE PREGNANCY TEST: ICD-10-CM

## 2023-08-09 PROCEDURE — 76801 OB US < 14 WKS SINGLE FETUS: CPT

## 2023-08-23 LAB
ABO/RH(D): NORMAL
ANTIBODY SCREEN: NEGATIVE
SPECIMEN EXPIRATION DATE: NORMAL

## 2023-08-24 ENCOUNTER — PRENATAL OFFICE VISIT (OUTPATIENT)
Dept: OBGYN | Facility: OTHER | Age: 33
End: 2023-08-24
Attending: OBSTETRICS & GYNECOLOGY
Payer: COMMERCIAL

## 2023-08-24 VITALS
OXYGEN SATURATION: 98 % | HEART RATE: 88 BPM | SYSTOLIC BLOOD PRESSURE: 121 MMHG | HEIGHT: 67 IN | WEIGHT: 284 LBS | BODY MASS INDEX: 44.57 KG/M2 | DIASTOLIC BLOOD PRESSURE: 74 MMHG

## 2023-08-24 DIAGNOSIS — E66.813 CLASS 3 OBESITY: ICD-10-CM

## 2023-08-24 DIAGNOSIS — Z34.81 SUPERVISION OF NORMAL INTRAUTERINE PREGNANCY IN MULTIGRAVIDA IN FIRST TRIMESTER: Primary | ICD-10-CM

## 2023-08-24 DIAGNOSIS — O34.219 PREVIOUS CESAREAN DELIVERY, ANTEPARTUM CONDITION OR COMPLICATION: ICD-10-CM

## 2023-08-24 LAB
ALBUMIN UR-MCNC: NEGATIVE MG/DL
AMPHETAMINES UR QL: NOT DETECTED
APPEARANCE UR: CLEAR
BACTERIA #/AREA URNS HPF: ABNORMAL /HPF
BARBITURATES UR QL SCN: NOT DETECTED
BENZODIAZ UR QL SCN: NOT DETECTED
BILIRUB UR QL STRIP: NEGATIVE
BUPRENORPHINE UR QL: NOT DETECTED
CANNABINOIDS UR QL: NOT DETECTED
COCAINE UR QL SCN: NOT DETECTED
COLOR UR AUTO: ABNORMAL
D-METHAMPHET UR QL: NOT DETECTED
ERYTHROCYTE [DISTWIDTH] IN BLOOD BY AUTOMATED COUNT: 12.2 % (ref 10–15)
GLUCOSE UR STRIP-MCNC: NEGATIVE MG/DL
HCT VFR BLD AUTO: 40.4 % (ref 35–47)
HGB BLD-MCNC: 13.6 G/DL (ref 11.7–15.7)
HGB UR QL STRIP: NEGATIVE
KETONES UR STRIP-MCNC: 40 MG/DL
LEUKOCYTE ESTERASE UR QL STRIP: NEGATIVE
MCH RBC QN AUTO: 30.3 PG (ref 26.5–33)
MCHC RBC AUTO-ENTMCNC: 33.7 G/DL (ref 31.5–36.5)
MCV RBC AUTO: 90 FL (ref 78–100)
METHADONE UR QL SCN: NOT DETECTED
MUCOUS THREADS #/AREA URNS LPF: PRESENT /LPF
NITRATE UR QL: NEGATIVE
OPIATES UR QL SCN: NOT DETECTED
OXYCODONE UR QL SCN: NOT DETECTED
PCP UR QL SCN: NOT DETECTED
PH UR STRIP: 6 [PH] (ref 4.7–8)
PLATELET # BLD AUTO: 298 10E3/UL (ref 150–450)
PROPOXYPH UR QL: NOT DETECTED
RBC # BLD AUTO: 4.49 10E6/UL (ref 3.8–5.2)
RBC URINE: 1 /HPF
SP GR UR STRIP: 1.01 (ref 1–1.03)
SQUAMOUS EPITHELIAL: 2 /HPF
TRICYCLICS UR QL SCN: NOT DETECTED
UROBILINOGEN UR STRIP-MCNC: NORMAL MG/DL
WBC # BLD AUTO: 7.8 10E3/UL (ref 4–11)
WBC URINE: <1 /HPF

## 2023-08-24 PROCEDURE — 81001 URINALYSIS AUTO W/SCOPE: CPT | Mod: 59 | Performed by: OBSTETRICS & GYNECOLOGY

## 2023-08-24 PROCEDURE — 87389 HIV-1 AG W/HIV-1&-2 AB AG IA: CPT | Performed by: OBSTETRICS & GYNECOLOGY

## 2023-08-24 PROCEDURE — 86803 HEPATITIS C AB TEST: CPT | Performed by: OBSTETRICS & GYNECOLOGY

## 2023-08-24 PROCEDURE — 86762 RUBELLA ANTIBODY: CPT | Performed by: OBSTETRICS & GYNECOLOGY

## 2023-08-24 PROCEDURE — 80306 DRUG TEST PRSMV INSTRMNT: CPT | Performed by: OBSTETRICS & GYNECOLOGY

## 2023-08-24 PROCEDURE — 99207 PR PRENATAL VISIT: CPT | Performed by: OBSTETRICS & GYNECOLOGY

## 2023-08-24 PROCEDURE — 87340 HEPATITIS B SURFACE AG IA: CPT | Performed by: OBSTETRICS & GYNECOLOGY

## 2023-08-24 PROCEDURE — 86850 RBC ANTIBODY SCREEN: CPT | Performed by: OBSTETRICS & GYNECOLOGY

## 2023-08-24 PROCEDURE — 85027 COMPLETE CBC AUTOMATED: CPT | Performed by: OBSTETRICS & GYNECOLOGY

## 2023-08-24 PROCEDURE — 86901 BLOOD TYPING SEROLOGIC RH(D): CPT | Performed by: OBSTETRICS & GYNECOLOGY

## 2023-08-24 PROCEDURE — 86900 BLOOD TYPING SEROLOGIC ABO: CPT | Performed by: OBSTETRICS & GYNECOLOGY

## 2023-08-24 PROCEDURE — 76815 OB US LIMITED FETUS(S): CPT | Performed by: OBSTETRICS & GYNECOLOGY

## 2023-08-24 PROCEDURE — 86780 TREPONEMA PALLIDUM: CPT | Performed by: OBSTETRICS & GYNECOLOGY

## 2023-08-24 PROCEDURE — 36415 COLL VENOUS BLD VENIPUNCTURE: CPT | Performed by: OBSTETRICS & GYNECOLOGY

## 2023-08-24 ASSESSMENT — PAIN SCALES - GENERAL: PAINLEVEL: NO PAIN (0)

## 2023-08-25 LAB
HBV SURFACE AG SERPL QL IA: NONREACTIVE
HCV AB SERPL QL IA: NONREACTIVE
HIV 1+2 AB+HIV1 P24 AG SERPL QL IA: NONREACTIVE
RUBV IGG SERPL QL IA: 1.88 INDEX
RUBV IGG SERPL QL IA: POSITIVE
T PALLIDUM AB SER QL: NONREACTIVE

## 2023-08-26 NOTE — PROGRESS NOTES
Doing well.  No concerns today.  Discussed genetic NIPT screeing.  Patient wishes to proceed  US for full fetal anatomical survey ordered.  Level 2  Return to clinic in 4 weeks,  OB physical.  US(limited) shows viable IUP, 12 wks GA, + .  Nml fluid/movement    Remy Toney MD  8/26/2023

## 2023-08-30 LAB — SCANNED LAB RESULT: NORMAL

## 2023-08-31 ENCOUNTER — DOCUMENTATION ONLY (OUTPATIENT)
Dept: OBGYN | Facility: OTHER | Age: 33
End: 2023-08-31

## 2023-08-31 NOTE — PROGRESS NOTES
Have you had or do you currently have:    - Diabetes? n  - Hypertension? n  - Heart disease, mitral valve prolapse, or rheumatic fever?  n  - An autoimmune disease such as lupus or rheumatoid arthritis?  n  - Kidney disease, urinary tract infection?  n  - Epilepsy, seizures, or spells?  n  - Migraine headaches?  n  - Any other neurological problems?  n  - Have you ever been treated for depression?  n  - Are you having problems with crying spells or loss of self-esteem?  n  - Have you ever required psychiatric care?  n  - Have you ever had hepatitis, liver disease, or jaundice?  n  - Have you ever been treated for blood clots in your veins, deep vein thrombosis, inflammation in the veins, thrombosis, phelbitis, pulmonary embolism or varicosities?  n  - Have you had excessive bleeding after surgery or dental work?  n  - Do you bleed more than other women after a cut or scratch?  n  - Do you have a history or anemia?  n  - Have you ever had thyroid problems or take thyroid medication?  n  - Do you have any endocrine problems?  n  - Have you every been in a major accident or suffered serious trauma?  n  - Within the last year, has anyone hit, slapped, kicked, or otherwise hurt you?  n  - In the last year, has anyone forced you to have sex when you didn't want to?  n  - Have you every received a blood transfusion?  nn  - Would you refuse a blood transfusion if a doctor judged it to be medically necessary?  n  - Does anyone in your home smoke? n  - Do you use tobacco products?  n  - Do you drink beer, wine, or hard liquor?  n  - Do you use any of the following: marijuana, speed, cocaine, heroin, hallucinogens, or other drugs?  n  - Is your blood type RH negative?  y  - Have you ever had asthma?  n  - Have you ever had tuberculosis?  n  - Do you have any allergies to drugs or over-the-counter medications?  n  - Allergies: dust mites, aspartame, ethanol, venlafaxine hydrochloride, sertraline?  n  - Have you had any breast  problems?  n  - Have you ever breast-fed?  yn  - Have you had any gynecological surgical procedures such as cervical conization, LEEP, laser treatment, cryosurgery of the cervix, or a dilatation and curettage, etc?  n  - Have you ever had any other surgical procedures?  y  - Have you ever had any anesthetic complications?  n  - Have you ever had an abnormal pap smear?  y  - Do you have a history of abnormalities of the uterus? n  - Did your mother take SARBJIT or any other hormones when she was pregnant with you?  n  - Did it take you more than one year to become pregnant?  n  - Have you ever been evaluated or treated for infertility?  n  - Is there a history of medical problems in your family, which you feel might adversely affect your health or pregnancy?  n  - Do you have any other problems we have not asked about which you feel may be important to this pregnancy?  n    Symptoms since last menstrual period  - Do you currently have any of the following symptoms: abdominal pain, blood in the stool or urine, chest pain, shortness of breath, coughing or vomiting up blood, you heart is racing or skipping beats, nausea and vomiting, pain on urination, or vaginal discharge or bleeding?  n    Genetic screening  Has the patient, baby's father, or anyone in either family had:  - Thalassemia (Italian, Greek, Mediterranean, or  background only) and an MCV result less than 80?  n  - Neural tube defect such as meningomyelocele, spina bifida, or anencephaly?  n  - Congential heart defect?  n  - Down's syndrome?  n  - Scout-Sachs disease ( Scientology, Cajun, Kosovan-Indian)?  n  - Sickle cell disease or trait () ?  n  - Hemophilia or other inherited problems of blood?  n  - Muscular dystrophy?  n  - Cystic fibrosis?  n  - Clearfield's chorea?  n  - Mental retardation/autism?  n   If yes, was the person tested for Fragile X?  n  - Any other inherited genetic or chromosomal disorder?  n  - Maternal metabolic disorder (e.g.  insulin- dependent diabetes, PKU)?  n  - A child with birth defects not listed above?  n  - Recurrent pregnancy loss, or a stillbirth?  y  - Has the patient had any medications/street drugs/alcohol since her last menstrual period?  n  - Does the patient or baby's father have any other genetic risk?  n    Infection history  - Have you ever been treated for tuberculosis?  n  - Have you every had a positive skin test for tuberculosis?  n  - Do you live with someone who has tuberculosis?  n  - Have you ever been exposed to tuberculosis?  n  - Do you have genital herpes?  n  - Does your partner have genital herpes?  n  - Have you had a rash or viral illness since your last period?  n  - Have you ever had gonorrhea, chlamydia, syphilis, venereal warts, trichomoniasis, pelvic inflammatory disease, or any other sexually transmitted disease?  n  - Have you had chicken pox?  n  - Have you been vaccinated against chicken pox?  n  - Have you had any other infectious diseases?  n

## 2023-09-22 ENCOUNTER — PRENATAL OFFICE VISIT (OUTPATIENT)
Dept: OBGYN | Facility: OTHER | Age: 33
End: 2023-09-22
Attending: NURSE PRACTITIONER
Payer: COMMERCIAL

## 2023-09-22 VITALS
SYSTOLIC BLOOD PRESSURE: 110 MMHG | BODY MASS INDEX: 43.95 KG/M2 | HEIGHT: 67 IN | WEIGHT: 280 LBS | HEART RATE: 87 BPM | DIASTOLIC BLOOD PRESSURE: 60 MMHG | OXYGEN SATURATION: 100 %

## 2023-09-22 DIAGNOSIS — Z34.81 SUPERVISION OF NORMAL INTRAUTERINE PREGNANCY IN MULTIGRAVIDA IN FIRST TRIMESTER: ICD-10-CM

## 2023-09-22 DIAGNOSIS — Z12.4 CERVICAL CANCER SCREENING: Primary | ICD-10-CM

## 2023-09-22 DIAGNOSIS — E66.813 CLASS 3 OBESITY: ICD-10-CM

## 2023-09-22 DIAGNOSIS — Z11.3 SCREEN FOR STD (SEXUALLY TRANSMITTED DISEASE): ICD-10-CM

## 2023-09-22 DIAGNOSIS — O09.42 HIGH RISK MULTIGRAVIDA IN SECOND TRIMESTER: ICD-10-CM

## 2023-09-22 LAB
C TRACH DNA SPEC QL PROBE+SIG AMP: NEGATIVE
GLUCOSE 1H P 50 G GLC PO SERPL-MCNC: 138 MG/DL (ref 70–129)
N GONORRHOEA DNA SPEC QL NAA+PROBE: NEGATIVE

## 2023-09-22 PROCEDURE — 36415 COLL VENOUS BLD VENIPUNCTURE: CPT | Performed by: NURSE PRACTITIONER

## 2023-09-22 PROCEDURE — G0123 SCREEN CERV/VAG THIN LAYER: HCPCS | Performed by: NURSE PRACTITIONER

## 2023-09-22 PROCEDURE — 82950 GLUCOSE TEST: CPT | Performed by: NURSE PRACTITIONER

## 2023-09-22 PROCEDURE — 87624 HPV HI-RISK TYP POOLED RSLT: CPT | Performed by: NURSE PRACTITIONER

## 2023-09-22 PROCEDURE — 87491 CHLMYD TRACH DNA AMP PROBE: CPT | Performed by: NURSE PRACTITIONER

## 2023-09-22 PROCEDURE — 87591 N.GONORRHOEAE DNA AMP PROB: CPT | Performed by: NURSE PRACTITIONER

## 2023-09-22 PROCEDURE — 99207 PR PRENATAL VISIT: CPT | Performed by: NURSE PRACTITIONER

## 2023-09-22 RX ORDER — ASPIRIN 81 MG/1
81 TABLET ORAL DAILY
COMMUNITY
End: 2024-03-15

## 2023-09-22 ASSESSMENT — PAIN SCALES - GENERAL: PAINLEVEL: NO PAIN (0)

## 2023-09-26 PROBLEM — O09.899 RUBELLA NON-IMMUNE STATUS, ANTEPARTUM: Status: RESOLVED | Noted: 2021-12-14 | Resolved: 2023-09-26

## 2023-09-26 PROBLEM — Z28.39 RUBELLA NON-IMMUNE STATUS, ANTEPARTUM: Status: RESOLVED | Noted: 2021-12-14 | Resolved: 2023-09-26

## 2023-09-26 PROBLEM — O99.820 GBS (GROUP B STREPTOCOCCUS CARRIER), +RV CULTURE, CURRENTLY PREGNANT: Status: RESOLVED | Noted: 2022-06-29 | Resolved: 2023-09-26

## 2023-09-26 PROBLEM — R87.619 ABNORMAL PAP SMEAR OF CERVIX: Status: RESOLVED | Noted: 2020-10-12 | Resolved: 2023-09-26

## 2023-09-26 PROBLEM — O09.42 HIGH RISK MULTIGRAVIDA IN SECOND TRIMESTER: Status: ACTIVE | Noted: 2023-09-26

## 2023-09-26 ASSESSMENT — PATIENT HEALTH QUESTIONNAIRE - PHQ9
SUM OF ALL RESPONSES TO PHQ QUESTIONS 1-9: 0
5. POOR APPETITE OR OVEREATING: NOT AT ALL

## 2023-09-26 ASSESSMENT — ANXIETY QUESTIONNAIRES
6. BECOMING EASILY ANNOYED OR IRRITABLE: NOT AT ALL
IF YOU CHECKED OFF ANY PROBLEMS ON THIS QUESTIONNAIRE, HOW DIFFICULT HAVE THESE PROBLEMS MADE IT FOR YOU TO DO YOUR WORK, TAKE CARE OF THINGS AT HOME, OR GET ALONG WITH OTHER PEOPLE: NOT DIFFICULT AT ALL
GAD7 TOTAL SCORE: 0
3. WORRYING TOO MUCH ABOUT DIFFERENT THINGS: NOT AT ALL
7. FEELING AFRAID AS IF SOMETHING AWFUL MIGHT HAPPEN: NOT AT ALL
GAD7 TOTAL SCORE: 0
5. BEING SO RESTLESS THAT IT IS HARD TO SIT STILL: NOT AT ALL
2. NOT BEING ABLE TO STOP OR CONTROL WORRYING: NOT AT ALL
1. FEELING NERVOUS, ANXIOUS, OR ON EDGE: NOT AT ALL

## 2023-09-26 NOTE — PROGRESS NOTES
"  HPI:  Destini Tee is a 33 year old female Patient's last menstrual period was 2023. at 17w0d, Estimated Date of Delivery: Mar 5, 2024.  She denies vaginal bleeding, vomiting, abdominal pain, and headaches. No other c/o.    No past medical history on file.    Past Surgical History:   Procedure Laterality Date     SECTION  2022    DILATION AND CURETTAGE SUCTION N/A 3/3/2023    Procedure: Suction Dilation and Curettage;  Surgeon: Remy Toney MD;  Location: [facility]    LAPAROSCOPIC CHOLECYSTECTOMY N/A 2022    Procedure: CHOLECYSTECTOMY, LAPAROSCOPIC;  Surgeon: Amauri Francisco MD;  Location: [facility]       Allergies: No known drug allergy     EXAM:  Blood pressure 110/60, pulse 87, height 1.702 m (5' 7\"), weight 127 kg (280 lb), last menstrual period 2023, SpO2 100 %, not currently breastfeeding.   BMI= Body mass index is 43.85 kg/m .  General - pleasant female in no acute distress.  Neck - supple without lymphadenopathy or thyromegaly.  Lungs - clear to auscultation bilaterally.  Heart - regular rate and rhythm without murmur.  Abdomen - soft, nontender, nondistended, no hepatosplenomegaly.  Pelvic - EG: normal adult female, BUS: within normal limits, Vagina: well rugated, no discharge, Cervix: no lesions or CMT, closed/long Uterus: gravid, consistant with dates, mobile, Adnexae: no masses or tenderness.  Rectovaginal - deferred.  Musculoskeletal - no gross deformities.  Neurological - normal strength, sensation, and mental status.    Doptones were 160    ASSESSMENT/PLAN:  (Z12.4) Cervical cancer screening  (primary encounter diagnosis)  Comment:   Plan: A pap thin layer screen with  HPV - recommended        age 30 - 65 years, HPV Hold (Lab Only)            (Z11.3) Screen for STD (sexually transmitted disease)  Comment:   Plan: GC/Chlamydia by PCR - [facility]            (Z34.81) Supervision of normal intrauterine pregnancy in multigravida in first trimester  Comment:   Plan: Glucose " tolerance gest screen 1 hour            (E66.01) Class 3 obesity ([facility])  Comment:   Plan: Glucose tolerance gest screen 1 hour            (O09.42) High risk multigravida in second trimester  Comment: new ob physical and teaching completed  Plan: return in 4 weeks.       Weight gain and exercise during pregnancy was discussed at today's visit.  The patient will return to clinic in 4 weeks for continued prenatal care.

## 2023-09-27 LAB
BKR LAB AP GYN ADEQUACY: NORMAL
BKR LAB AP GYN INTERPRETATION: NORMAL
BKR LAB AP HPV REFLEX: NORMAL
BKR LAB AP PREVIOUS ABNORMAL: NORMAL
PATH REPORT.COMMENTS IMP SPEC: NORMAL
PATH REPORT.COMMENTS IMP SPEC: NORMAL
PATH REPORT.RELEVANT HX SPEC: NORMAL

## 2023-09-29 ENCOUNTER — LAB (OUTPATIENT)
Dept: LAB | Facility: OTHER | Age: 33
End: 2023-09-29
Payer: COMMERCIAL

## 2023-09-29 DIAGNOSIS — O09.42 HIGH RISK MULTIGRAVIDA IN SECOND TRIMESTER: Primary | ICD-10-CM

## 2023-09-29 DIAGNOSIS — O09.42 HIGH RISK MULTIGRAVIDA IN SECOND TRIMESTER: ICD-10-CM

## 2023-09-29 LAB
GESTATIONAL GTT 1 HR POST DOSE: 87 MG/DL (ref 60–179)
GESTATIONAL GTT 2 HR POST DOSE: 102 MG/DL (ref 60–154)
GESTATIONAL GTT 3 HR POST DOSE: 69 MG/DL (ref 60–139)
GLUCOSE P FAST SERPL-MCNC: 90 MG/DL (ref 60–94)

## 2023-09-29 PROCEDURE — 82952 GTT-ADDED SAMPLES: CPT

## 2023-09-29 PROCEDURE — 36415 COLL VENOUS BLD VENIPUNCTURE: CPT

## 2023-09-29 PROCEDURE — 82951 GLUCOSE TOLERANCE TEST (GTT): CPT

## 2023-10-20 ENCOUNTER — PRENATAL OFFICE VISIT (OUTPATIENT)
Dept: OBGYN | Facility: OTHER | Age: 33
End: 2023-10-20
Attending: NURSE PRACTITIONER
Payer: COMMERCIAL

## 2023-10-20 VITALS
WEIGHT: 281 LBS | DIASTOLIC BLOOD PRESSURE: 60 MMHG | HEART RATE: 89 BPM | BODY MASS INDEX: 44.1 KG/M2 | HEIGHT: 67 IN | OXYGEN SATURATION: 100 % | SYSTOLIC BLOOD PRESSURE: 100 MMHG

## 2023-10-20 DIAGNOSIS — O09.42 HIGH RISK MULTIGRAVIDA IN SECOND TRIMESTER: Primary | ICD-10-CM

## 2023-10-20 PROCEDURE — 99207 PR PRENATAL VISIT: CPT | Performed by: NURSE PRACTITIONER

## 2023-10-20 ASSESSMENT — PAIN SCALES - GENERAL: PAINLEVEL: NO PAIN (0)

## 2023-10-20 NOTE — PROGRESS NOTES
Doing well.  Denies concerns.  Discussed upcoming US next week.  Declines flu shot.  Mid pregnancy changes and comfort measures discussed. Return in 4 weeks.

## 2023-10-26 ENCOUNTER — OFFICE VISIT (OUTPATIENT)
Dept: MATERNAL FETAL MEDICINE | Facility: CLINIC | Age: 33
End: 2023-10-26
Attending: OBSTETRICS & GYNECOLOGY
Payer: COMMERCIAL

## 2023-10-26 ENCOUNTER — HOSPITAL ENCOUNTER (OUTPATIENT)
Dept: ULTRASOUND IMAGING | Facility: HOSPITAL | Age: 33
Discharge: HOME | End: 2023-10-26
Attending: OBSTETRICS & GYNECOLOGY | Admitting: OBSTETRICS & GYNECOLOGY
Payer: COMMERCIAL

## 2023-10-26 ENCOUNTER — HOSPITAL ENCOUNTER (OUTPATIENT)
Dept: ULTRASOUND IMAGING | Facility: CLINIC | Age: 33
Discharge: HOME | End: 2023-10-26
Attending: OBSTETRICS & GYNECOLOGY
Payer: COMMERCIAL

## 2023-10-26 DIAGNOSIS — O99.212 OBESITY AFFECTING PREGNANCY IN SECOND TRIMESTER, UNSPECIFIED OBESITY TYPE: Primary | ICD-10-CM

## 2023-10-26 DIAGNOSIS — E66.813 CLASS 3 OBESITY: ICD-10-CM

## 2023-10-26 DIAGNOSIS — O09.42 HIGH RISK MULTIGRAVIDA IN SECOND TRIMESTER: Primary | ICD-10-CM

## 2023-10-26 PROCEDURE — 76811 OB US DETAILED SNGL FETUS: CPT | Mod: 26 | Performed by: OBSTETRICS & GYNECOLOGY

## 2023-10-26 NOTE — PROGRESS NOTES
Type of service:    Telemedicine Diagnostic Assessment for comprehensive ultrasound    Date of service:     Date: 10/26/23     Time service began:    1:00 PM  Time service ended:    2:00 PM    Reason:      Lack of available service in patient area    Description of basis or telemedicine appropriateness:     Consultation provided at the request of Dr. Toney for advice regarding the diagnosis and treatment of this patient's pregnancy complicated by BMI 44.  The patient's condition can be safely assessed via telemedicine.    The Mode of Transmission:    Secure interactive audio and visual telecommunication system (Video Guidance)    Location of originating and distant sites:      Originating site:   [place], [place]    Distant site:    [place], [place]    Kath Pace MD

## 2023-11-09 ENCOUNTER — OFFICE VISIT (OUTPATIENT)
Dept: MATERNAL FETAL MEDICINE | Facility: CLINIC | Age: 33
End: 2023-11-09
Attending: NURSE PRACTITIONER
Payer: COMMERCIAL

## 2023-11-09 ENCOUNTER — HOSPITAL ENCOUNTER (OUTPATIENT)
Dept: ULTRASOUND IMAGING | Facility: CLINIC | Age: 33
Discharge: HOME | End: 2023-11-09
Attending: NURSE PRACTITIONER
Payer: COMMERCIAL

## 2023-11-09 ENCOUNTER — HOSPITAL ENCOUNTER (OUTPATIENT)
Dept: ULTRASOUND IMAGING | Facility: HOSPITAL | Age: 33
Discharge: HOME | End: 2023-11-09
Attending: NURSE PRACTITIONER | Admitting: NURSE PRACTITIONER
Payer: COMMERCIAL

## 2023-11-09 DIAGNOSIS — O99.212 OBESITY AFFECTING PREGNANCY IN SECOND TRIMESTER, UNSPECIFIED OBESITY TYPE: Primary | ICD-10-CM

## 2023-11-09 DIAGNOSIS — O09.42 HIGH RISK MULTIGRAVIDA IN SECOND TRIMESTER: ICD-10-CM

## 2023-11-09 DIAGNOSIS — O09.43 HIGH RISK MULTIGRAVIDA IN THIRD TRIMESTER: Primary | ICD-10-CM

## 2023-11-09 PROCEDURE — 76816 OB US FOLLOW-UP PER FETUS: CPT | Mod: 26 | Performed by: STUDENT IN AN ORGANIZED HEALTH CARE EDUCATION/TRAINING PROGRAM

## 2023-11-09 PROCEDURE — 76816 OB US FOLLOW-UP PER FETUS: CPT

## 2023-11-17 ENCOUNTER — PRENATAL OFFICE VISIT (OUTPATIENT)
Dept: OBGYN | Facility: OTHER | Age: 33
End: 2023-11-17
Attending: NURSE PRACTITIONER
Payer: COMMERCIAL

## 2023-11-17 VITALS — SYSTOLIC BLOOD PRESSURE: 96 MMHG | DIASTOLIC BLOOD PRESSURE: 60 MMHG | WEIGHT: 280 LBS | BODY MASS INDEX: 43.85 KG/M2

## 2023-11-17 DIAGNOSIS — O99.213 OBESITY AFFECTING PREGNANCY IN THIRD TRIMESTER, UNSPECIFIED OBESITY TYPE: ICD-10-CM

## 2023-11-17 DIAGNOSIS — O09.42 HIGH RISK MULTIGRAVIDA IN SECOND TRIMESTER: ICD-10-CM

## 2023-11-17 DIAGNOSIS — O26.899 RH NEGATIVE STATE IN ANTEPARTUM PERIOD: Primary | ICD-10-CM

## 2023-11-17 DIAGNOSIS — Z67.91 RH NEGATIVE STATE IN ANTEPARTUM PERIOD: Primary | ICD-10-CM

## 2023-11-17 PROCEDURE — 99207 PR PRENATAL VISIT: CPT | Performed by: NURSE PRACTITIONER

## 2023-11-21 NOTE — PROGRESS NOTES
Doing well.  Baby active.  No cramping or bleeding.  Declines flu sot.  28 week labs discussed and ordered.  Referral placed for delivery in [place].  Changes and comfort measures discussed.  Return in 4 weeks.

## 2023-12-14 ENCOUNTER — LAB (OUTPATIENT)
Dept: LAB | Facility: OTHER | Age: 33
End: 2023-12-14
Payer: COMMERCIAL

## 2023-12-14 ENCOUNTER — PRENATAL OFFICE VISIT (OUTPATIENT)
Dept: OBGYN | Facility: OTHER | Age: 33
End: 2023-12-14
Attending: OBSTETRICS & GYNECOLOGY
Payer: COMMERCIAL

## 2023-12-14 VITALS
OXYGEN SATURATION: 99 % | WEIGHT: 283.7 LBS | DIASTOLIC BLOOD PRESSURE: 60 MMHG | BODY MASS INDEX: 44.43 KG/M2 | SYSTOLIC BLOOD PRESSURE: 95 MMHG | HEART RATE: 101 BPM

## 2023-12-14 DIAGNOSIS — O26.899 RH NEGATIVE STATE IN ANTEPARTUM PERIOD: Primary | ICD-10-CM

## 2023-12-14 DIAGNOSIS — O09.42 HIGH RISK MULTIGRAVIDA IN SECOND TRIMESTER: ICD-10-CM

## 2023-12-14 DIAGNOSIS — O99.213 OBESITY AFFECTING PREGNANCY IN THIRD TRIMESTER, UNSPECIFIED OBESITY TYPE: ICD-10-CM

## 2023-12-14 DIAGNOSIS — Z67.91 RH NEGATIVE STATE IN ANTEPARTUM PERIOD: Primary | ICD-10-CM

## 2023-12-14 DIAGNOSIS — O26.899 RH NEGATIVE STATE IN ANTEPARTUM PERIOD: ICD-10-CM

## 2023-12-14 DIAGNOSIS — Z67.91 RH NEGATIVE STATE IN ANTEPARTUM PERIOD: ICD-10-CM

## 2023-12-14 DIAGNOSIS — Z23 NEED FOR VACCINATION: ICD-10-CM

## 2023-12-14 LAB
ANTIBODY SCREEN: NEGATIVE
ERYTHROCYTE [DISTWIDTH] IN BLOOD BY AUTOMATED COUNT: 12.9 % (ref 10–15)
GLUCOSE 1H P 50 G GLC PO SERPL-MCNC: 107 MG/DL (ref 70–129)
HCT VFR BLD AUTO: 37.8 % (ref 35–47)
HGB BLD-MCNC: 12.9 G/DL (ref 11.7–15.7)
MCH RBC QN AUTO: 30.5 PG (ref 26.5–33)
MCHC RBC AUTO-ENTMCNC: 34.1 G/DL (ref 31.5–36.5)
MCV RBC AUTO: 89 FL (ref 78–100)
PLATELET # BLD AUTO: 245 10E3/UL (ref 150–450)
RBC # BLD AUTO: 4.23 10E6/UL (ref 3.8–5.2)
SPECIMEN EXPIRATION DATE: NORMAL
WBC # BLD AUTO: 6.2 10E3/UL (ref 4–11)

## 2023-12-14 PROCEDURE — 99207 PR PRENATAL VISIT: CPT | Performed by: OBSTETRICS & GYNECOLOGY

## 2023-12-14 PROCEDURE — 82950 GLUCOSE TEST: CPT

## 2023-12-14 PROCEDURE — 86780 TREPONEMA PALLIDUM: CPT

## 2023-12-14 PROCEDURE — 86850 RBC ANTIBODY SCREEN: CPT

## 2023-12-14 PROCEDURE — 90471 IMMUNIZATION ADMIN: CPT | Performed by: OBSTETRICS & GYNECOLOGY

## 2023-12-14 PROCEDURE — 85027 COMPLETE CBC AUTOMATED: CPT

## 2023-12-14 PROCEDURE — 96372 THER/PROPH/DIAG INJ SC/IM: CPT | Performed by: OBSTETRICS & GYNECOLOGY

## 2023-12-14 PROCEDURE — 36415 COLL VENOUS BLD VENIPUNCTURE: CPT

## 2023-12-14 PROCEDURE — 90715 TDAP VACCINE 7 YRS/> IM: CPT | Performed by: OBSTETRICS & GYNECOLOGY

## 2023-12-14 ASSESSMENT — PAIN SCALES - GENERAL: PAINLEVEL: NO PAIN (0)

## 2023-12-14 NOTE — PROGRESS NOTES
Clinic Administered Medication Documentation        Patient was given Rhogam. Prior to medication administration, verified patient's identity using patient s name and date of birth. Please see MAR and medication order for additional information. Patient instructed to remain in clinic for 15 minutes and report any adverse reaction to staff immediately but patient declined.    Vial/Syringe: Syringe    Left Gluteus.    Bridget Orozco LPN

## 2023-12-14 NOTE — PROGRESS NOTES
Doing well.  No concerns today.  1 hour GTT done today along with other necessary labs.  Prenatal flowsheet information is reviewed.  Discussed kick counts and fetal movement.  RTC in 2 weeks  TDAP and RHIG done  US scheduled next week.  [facility] referral refaxed.     Denies PTL birttanie, karel, pat Toney MD  12/14/2023

## 2023-12-15 LAB — T PALLIDUM AB SER QL: NONREACTIVE

## 2023-12-18 ENCOUNTER — HOSPITAL ENCOUNTER (OUTPATIENT)
Dept: ULTRASOUND IMAGING | Facility: HOSPITAL | Age: 33
Discharge: HOME | End: 2023-12-18
Attending: NURSE PRACTITIONER | Admitting: NURSE PRACTITIONER
Payer: COMMERCIAL

## 2023-12-18 DIAGNOSIS — O09.43 HIGH RISK MULTIGRAVIDA IN THIRD TRIMESTER: ICD-10-CM

## 2023-12-18 PROCEDURE — 76816 OB US FOLLOW-UP PER FETUS: CPT

## 2023-12-29 ENCOUNTER — PRENATAL OFFICE VISIT (OUTPATIENT)
Dept: OBGYN | Facility: OTHER | Age: 33
End: 2023-12-29
Attending: NURSE PRACTITIONER
Payer: COMMERCIAL

## 2023-12-29 VITALS
SYSTOLIC BLOOD PRESSURE: 110 MMHG | HEART RATE: 94 BPM | WEIGHT: 281.4 LBS | OXYGEN SATURATION: 100 % | BODY MASS INDEX: 44.07 KG/M2 | DIASTOLIC BLOOD PRESSURE: 75 MMHG

## 2023-12-29 DIAGNOSIS — O09.43 HIGH RISK MULTIGRAVIDA IN THIRD TRIMESTER: Primary | ICD-10-CM

## 2023-12-29 PROCEDURE — 99207 PR PRENATAL VISIT: CPT | Performed by: NURSE PRACTITIONER

## 2023-12-29 ASSESSMENT — PAIN SCALES - GENERAL: PAINLEVEL: NO PAIN (0)

## 2023-12-29 NOTE — PROGRESS NOTES
Doing well.  Denies concerns today.  Baby active.  No cramping, bleeding, or leaking.  Was seen in [place] by Dr Sanchez.   Third trimester changes, comfort measures, and warning signs reviewed. Return in 2 weeks.

## 2024-01-12 ENCOUNTER — PRENATAL OFFICE VISIT (OUTPATIENT)
Dept: OBGYN | Facility: OTHER | Age: 34
End: 2024-01-12
Attending: NURSE PRACTITIONER
Payer: COMMERCIAL

## 2024-01-12 VITALS
HEIGHT: 67 IN | BODY MASS INDEX: 45.67 KG/M2 | DIASTOLIC BLOOD PRESSURE: 60 MMHG | SYSTOLIC BLOOD PRESSURE: 110 MMHG | OXYGEN SATURATION: 100 % | WEIGHT: 291 LBS | HEART RATE: 104 BPM

## 2024-01-12 DIAGNOSIS — O09.43 HIGH RISK MULTIGRAVIDA IN THIRD TRIMESTER: Primary | ICD-10-CM

## 2024-01-12 PROCEDURE — 99207 PR PRENATAL VISIT: CPT | Performed by: NURSE PRACTITIONER

## 2024-01-12 ASSESSMENT — PAIN SCALES - GENERAL: PAINLEVEL: NO PAIN (0)

## 2024-01-12 NOTE — PROGRESS NOTES
Doing well.  Denies concerns.  Baby active.  No cramping or signs of PTL. Reivewed upcoming US for growth and weekly BPP's.  Return in 2 weeks.

## 2024-01-26 ENCOUNTER — PRENATAL OFFICE VISIT (OUTPATIENT)
Dept: OBGYN | Facility: OTHER | Age: 34
End: 2024-01-26
Attending: NURSE PRACTITIONER
Payer: COMMERCIAL

## 2024-01-26 VITALS — BODY MASS INDEX: 45.58 KG/M2 | SYSTOLIC BLOOD PRESSURE: 110 MMHG | DIASTOLIC BLOOD PRESSURE: 68 MMHG | WEIGHT: 291 LBS

## 2024-01-26 DIAGNOSIS — Z23 ENCOUNTER FOR ADMINISTRATION OF VACCINE: ICD-10-CM

## 2024-01-26 DIAGNOSIS — O09.43 HIGH RISK MULTIGRAVIDA IN THIRD TRIMESTER: ICD-10-CM

## 2024-01-26 PROCEDURE — 90471 IMMUNIZATION ADMIN: CPT | Performed by: NURSE PRACTITIONER

## 2024-01-26 PROCEDURE — 99207 PR PRENATAL VISIT: CPT | Performed by: NURSE PRACTITIONER

## 2024-01-26 PROCEDURE — 90678 RSV VACC PREF BIVALENT IM: CPT | Performed by: NURSE PRACTITIONER

## 2024-01-26 ASSESSMENT — PAIN SCALES - GENERAL: PAINLEVEL: NO PAIN (0)

## 2024-01-26 NOTE — PROGRESS NOTES
Doing well.  No concerns today.  RSV vaccine given. Baby active.  No cramping or bleeding.  US for growth and weekly BPP's beginning Monday. Late pregnancy changes and warning signs dicussed.  Return in 2 weeks.

## 2024-01-29 ENCOUNTER — HOSPITAL ENCOUNTER (OUTPATIENT)
Dept: ULTRASOUND IMAGING | Facility: HOSPITAL | Age: 34
Discharge: HOME | End: 2024-01-29
Attending: NURSE PRACTITIONER
Payer: COMMERCIAL

## 2024-01-29 DIAGNOSIS — O09.43 HIGH RISK MULTIGRAVIDA IN THIRD TRIMESTER: ICD-10-CM

## 2024-01-29 PROCEDURE — 76816 OB US FOLLOW-UP PER FETUS: CPT

## 2024-01-29 PROCEDURE — 76819 FETAL BIOPHYS PROFIL W/O NST: CPT

## 2024-02-05 ENCOUNTER — HOSPITAL ENCOUNTER (OUTPATIENT)
Dept: ULTRASOUND IMAGING | Facility: HOSPITAL | Age: 34
Discharge: HOME | End: 2024-02-05
Attending: NURSE PRACTITIONER | Admitting: NURSE PRACTITIONER
Payer: COMMERCIAL

## 2024-02-05 DIAGNOSIS — O09.43 HIGH RISK MULTIGRAVIDA IN THIRD TRIMESTER: ICD-10-CM

## 2024-02-05 PROCEDURE — 76819 FETAL BIOPHYS PROFIL W/O NST: CPT

## 2024-02-15 ENCOUNTER — PRENATAL OFFICE VISIT (OUTPATIENT)
Dept: OBGYN | Facility: OTHER | Age: 34
End: 2024-02-15
Attending: NURSE PRACTITIONER
Payer: COMMERCIAL

## 2024-02-15 VITALS
HEIGHT: 67 IN | DIASTOLIC BLOOD PRESSURE: 60 MMHG | WEIGHT: 290 LBS | SYSTOLIC BLOOD PRESSURE: 110 MMHG | OXYGEN SATURATION: 99 % | HEART RATE: 95 BPM | BODY MASS INDEX: 45.52 KG/M2

## 2024-02-15 DIAGNOSIS — O09.43 HIGH RISK MULTIGRAVIDA IN THIRD TRIMESTER: Primary | ICD-10-CM

## 2024-02-15 PROCEDURE — 99207 PR PRENATAL VISIT: CPT | Performed by: NURSE PRACTITIONER

## 2024-02-15 ASSESSMENT — PAIN SCALES - GENERAL: PAINLEVEL: NO PAIN (0)

## 2024-02-15 NOTE — PROGRESS NOTES
No concerns today.  Baby active.  No VB or leaking.  No hortencia.  Pre op next visit.  Continue weekly BPP's .  Return in 1 week.  [facility]st @ [facility]

## 2024-02-19 ENCOUNTER — HOSPITAL ENCOUNTER (OUTPATIENT)
Dept: ULTRASOUND IMAGING | Facility: HOSPITAL | Age: 34
Discharge: HOME | End: 2024-02-19
Attending: NURSE PRACTITIONER | Admitting: NURSE PRACTITIONER
Payer: COMMERCIAL

## 2024-02-19 DIAGNOSIS — O09.43 HIGH RISK MULTIGRAVIDA IN THIRD TRIMESTER: ICD-10-CM

## 2024-02-19 PROCEDURE — 76819 FETAL BIOPHYS PROFIL W/O NST: CPT

## 2024-02-22 ENCOUNTER — PRENATAL OFFICE VISIT (OUTPATIENT)
Dept: OBGYN | Facility: OTHER | Age: 34
End: 2024-02-22
Attending: OBSTETRICS & GYNECOLOGY
Payer: COMMERCIAL

## 2024-02-22 VITALS — SYSTOLIC BLOOD PRESSURE: 104 MMHG | WEIGHT: 291 LBS | BODY MASS INDEX: 45.58 KG/M2 | DIASTOLIC BLOOD PRESSURE: 68 MMHG

## 2024-02-22 DIAGNOSIS — E66.813 CLASS 3 OBESITY: ICD-10-CM

## 2024-02-22 DIAGNOSIS — Z01.818 PREOP GENERAL PHYSICAL EXAM: ICD-10-CM

## 2024-02-22 DIAGNOSIS — O09.43 HIGH RISK MULTIGRAVIDA IN THIRD TRIMESTER: Primary | ICD-10-CM

## 2024-02-22 PROCEDURE — 99207 PR COMPLICATED OB VISIT: CPT | Performed by: OBSTETRICS & GYNECOLOGY

## 2024-02-22 ASSESSMENT — PAIN SCALES - GENERAL: PAINLEVEL: NO PAIN (0)

## 2024-02-22 NOTE — PROGRESS NOTES
38 2/7 wks  Doing well.  No concerns today.  Discussed signs of labor and when to call or come in.  Discussed kick counts and fetal movement.  Denies regular contractions, vaginal bleeding, LOF  US:  BPP , vtx     Preop done.   Pt had mild Covid 24 completely recovered.   H&P reviewed and otherwise unchanged from prenatal record.  Chest clear to auscultation.  CV regular rate and rhythm without murmurs.  Abdomen soft, nontender, gravid.  Extremities negative.  Reveiwed goals, risks, alternatives of a  section including bleeding, infection, and potential damage to other organs including bowel, bladder, baby, blood vessels and nerves.  Potential need for  in future.  Hosptial stay and recovery period discussed.    Scheduled for  Repeat CD in [place] on 3/1/24.  RH neg  BPP scheduled on Monday  PP appts scheduled  Remy Toney MD

## 2024-02-26 ENCOUNTER — HOSPITAL ENCOUNTER (OUTPATIENT)
Dept: ULTRASOUND IMAGING | Facility: HOSPITAL | Age: 34
Discharge: HOME | End: 2024-02-26
Attending: NURSE PRACTITIONER | Admitting: NURSE PRACTITIONER
Payer: COMMERCIAL

## 2024-02-26 DIAGNOSIS — O09.43 HIGH RISK MULTIGRAVIDA IN THIRD TRIMESTER: ICD-10-CM

## 2024-02-26 PROCEDURE — 76819 FETAL BIOPHYS PROFIL W/O NST: CPT

## 2024-03-15 ENCOUNTER — PRENATAL OFFICE VISIT (OUTPATIENT)
Dept: OBGYN | Facility: OTHER | Age: 34
End: 2024-03-15
Attending: NURSE PRACTITIONER
Payer: COMMERCIAL

## 2024-03-15 VITALS
WEIGHT: 275 LBS | SYSTOLIC BLOOD PRESSURE: 100 MMHG | HEIGHT: 67 IN | BODY MASS INDEX: 43.16 KG/M2 | DIASTOLIC BLOOD PRESSURE: 62 MMHG | HEART RATE: 76 BPM

## 2024-03-15 PROCEDURE — 99207 PR NO CHARGE LOS: CPT | Performed by: NURSE PRACTITIONER

## 2024-03-15 RX ORDER — AMOXICILLIN 250 MG
2 CAPSULE ORAL
COMMUNITY
Start: 2024-03-03 | End: 2024-03-15

## 2024-03-15 RX ORDER — IBUPROFEN 800 MG/1
800 TABLET, FILM COATED ORAL
COMMUNITY
Start: 2024-03-03 | End: 2024-03-15

## 2024-03-15 RX ORDER — ACETAMINOPHEN 500 MG
1000 TABLET ORAL
COMMUNITY
Start: 2024-03-03 | End: 2024-03-15

## 2024-03-15 ASSESSMENT — PAIN SCALES - GENERAL: PAINLEVEL: NO PAIN (0)

## 2024-03-15 NOTE — PROGRESS NOTES
"SUBJECTIVE:  Destini Tee is a 33 year old female P2 here for a 2 week postpartum visit.  She had a  Section  on 3/01/24 delivering a healthy baby girl weighing 7 lbs  at term.      Today's Depression Rating was 0    delivery complications:  No  breast feeding:  Yes, breast and bottle  bladder problems:  No  bowel problems/hemorrhoids:  No  episiotomy/laceration/incision healed? Yes: improving.    vaginal flow:  light to moderate.  No clots  [place]:  No  contraception:  Depo-Provera  emotional adjustment:  doing well and happy      OBJECTIVE:  Blood pressure 100/62, pulse 76, height 1.702 m (5' 7\"), weight 124.7 kg (275 lb), last menstrual period 2023, currently breastfeeding.   General - pleasant female in no acute distress.  Abdomen - soft, nontender, nondistended, no hepatosplenomegaly. Incision clean and dry.  Mild redness noted without signs of infection.  Stressed lifting restrictions.     ASSESSMENT:  normal postpartum check    PLAN:  Breastfeeding support and anticipatory guidance  PPD signs and sx reviewed.  Continue all PP restrictions.  Return at 6 weeks PP as scheduled.  "

## 2024-03-29 ENCOUNTER — MEDICAL CORRESPONDENCE (OUTPATIENT)
Dept: HEALTH INFORMATION MANAGEMENT | Facility: HOSPITAL | Age: 34
End: 2024-03-29

## 2024-04-12 ENCOUNTER — PRENATAL OFFICE VISIT (OUTPATIENT)
Dept: OBGYN | Facility: OTHER | Age: 34
End: 2024-04-12
Attending: OBSTETRICS & GYNECOLOGY
Payer: COMMERCIAL

## 2024-04-12 VITALS
HEIGHT: 67 IN | HEART RATE: 68 BPM | BODY MASS INDEX: 42.85 KG/M2 | DIASTOLIC BLOOD PRESSURE: 72 MMHG | WEIGHT: 273 LBS | SYSTOLIC BLOOD PRESSURE: 100 MMHG

## 2024-04-12 DIAGNOSIS — Z30.013 ENCOUNTER FOR INITIAL PRESCRIPTION OF INJECTABLE CONTRACEPTIVE: Primary | ICD-10-CM

## 2024-04-12 PROCEDURE — 96372 THER/PROPH/DIAG INJ SC/IM: CPT | Performed by: OBSTETRICS & GYNECOLOGY

## 2024-04-12 PROCEDURE — 99207 PR POST PARTUM EXAM: CPT | Performed by: OBSTETRICS & GYNECOLOGY

## 2024-04-12 RX ORDER — MEDROXYPROGESTERONE ACETATE 150 MG/ML
150 INJECTION, SUSPENSION INTRAMUSCULAR
Status: ACTIVE | OUTPATIENT
Start: 2024-04-12 | End: 2025-04-07

## 2024-04-12 RX ADMIN — MEDROXYPROGESTERONE ACETATE 150 MG: 150 INJECTION, SUSPENSION INTRAMUSCULAR at 11:55

## 2024-04-12 ASSESSMENT — PAIN SCALES - GENERAL: PAINLEVEL: NO PAIN (0)

## 2024-04-12 NOTE — PROGRESS NOTES

## 2024-04-16 NOTE — PROGRESS NOTES
"SUBJECTIVE:  Destini Tee is a 33 year old female P2 here for a postpartum visit.  She had a  Section   delivering a healthy baby girl  Currently no complaints and doing well.    Today's Depression Rating was 0  delivery complications:  No  breast feeding:  No  bladder problems:  No  bowel problems/hemorrhoids:  No  episiotomy/laceration/incision healed? Yes  vaginal flow:  None  [place]:  No  contraception:  abstinence  emotional adjustment:  doing well and happy      OBJECTIVE:  Blood pressure 100/72, pulse 68, height 1.702 m (5' 7\"), weight 123.8 kg (273 lb), last menstrual period 2023, not currently breastfeeding.   General - pleasant female in no acute distress.  Breast - no nodularity, asymmetry or nipple discharge bilaterally.  Abdomen - soft, nontender, nondistended, no hepatosplenomegaly.  Pelvic - EG: normal adult female, BUS: within normal limits, Vagina: well rugated, no discharge, Cervix: no lesions or CMT, Uterus: firm, normal sized and nontender, Adnexae: no masses or tenderness.  Rectovaginal - deferred.    ASSESSMENT:  normal postpartum exam.  Released from pregnancy related restrictions    PLAN:    May resume normal activities without restrictions  Pap smear Not Done today    The patient will use Depo-Provera for birth control which she has used successfully in past. . Full counseling was provided, and all questions answered. Compliance is strongly emphasized.  Return to clinic in one year for an annual, when due for a pap smear or PRN.    Remy Toney MD   "

## 2024-07-01 ENCOUNTER — MEDICAL CORRESPONDENCE (OUTPATIENT)
Dept: HEALTH INFORMATION MANAGEMENT | Facility: HOSPITAL | Age: 34
End: 2024-07-01

## 2024-07-01 ENCOUNTER — ALLIED HEALTH/NURSE VISIT (OUTPATIENT)
Dept: ALLERGY | Facility: OTHER | Age: 34
End: 2024-07-01
Attending: OBSTETRICS & GYNECOLOGY
Payer: COMMERCIAL

## 2024-07-01 DIAGNOSIS — Z30.42 ENCOUNTER FOR SURVEILLANCE OF INJECTABLE CONTRACEPTIVE: Primary | ICD-10-CM

## 2024-07-01 PROCEDURE — 96372 THER/PROPH/DIAG INJ SC/IM: CPT | Performed by: OBSTETRICS & GYNECOLOGY

## 2024-07-01 RX ADMIN — MEDROXYPROGESTERONE ACETATE 150 MG: 150 INJECTION, SUSPENSION INTRAMUSCULAR at 14:22

## 2024-07-01 NOTE — PROGRESS NOTES
Clinic Administered Medication Documentation      Depo Provera Documentation    Depo-Provera Standing Order inclusion/exclusion criteria reviewed.     Is this the initial or subsequent dose of Depo Provera? Subsequent dose - patient is within the acceptable window of time (11-15 weeks) for subsequent injection. Pregnancy test not indicated.    Patient meets: inclusion criteria     Is there an active order (written within the past 365 days, with administrations remaining, not ) in the chart? Yes.     Prior to injection, verified patient identity using patient's name and date of birth. Medication was administered. Please see MAR and medication order for additional information.     Vial/Syringe: Single dose vial. Was entire vial of medication used? Yes    Patient instructed to report any adverse reaction to staff immediately.  NEXT INJECTION DUE: 24 - 10/14/24    Verified that the patient has refills remaining in their prescription.

## 2024-07-07 ENCOUNTER — HEALTH MAINTENANCE LETTER (OUTPATIENT)
Age: 34
End: 2024-07-07

## 2024-09-12 ENCOUNTER — MEDICAL CORRESPONDENCE (OUTPATIENT)
Dept: HEALTH INFORMATION MANAGEMENT | Facility: HOSPITAL | Age: 34
End: 2024-09-12

## 2024-10-04 NOTE — PROGRESS NOTES
Patient presents to clinic today for depo-provera injection per Dr. Toney.    Last injection given: 24 in left gluteus lio with NEXT INJECTION DUE: 24 - 10/14/24       Clinic-Administered Medication Detail     Dose Frequency Start End SIMA   medroxyPROGESTERone (DEPO-PROVERA) injection 150 mg 150 mg EVERY 3 MONTHS 2024 --   Class: Normal   Route: Intramuscular   Order: [id number]     Clinic Administered Medication Documentation      Depo Provera Documentation    Depo-Provera Standing Order inclusion/exclusion criteria reviewed.     Is this the initial or subsequent dose of Depo Provera? Subsequent dose - patient is within the acceptable window of time (11-15 weeks) for subsequent injection. Pregnancy test not indicated.    Patient meets: inclusion criteria     Is there an active order (written within the past 365 days, with administrations remaining, not ) in the chart? Yes.     Prior to injection, verified patient identity using patient's name and date of birth. Medication was administered. Please see MAR and medication order for additional information.     Vial/Syringe: Single dose vial. Was entire vial of medication used? Yes    Patient instructed to remain in clinic for 15 minutes and report any adverse reaction to staff immediately.  NEXT INJECTION DUE: 24 - 25    Verified that the patient has refills remaining in their prescription.    Date range for next depo-provera injection provided to patient.  Patient will call to schedule at a later time when she knows her work schedule.    Shawnee Traore, RN Care Coordinator  [facility]

## 2024-10-09 ENCOUNTER — ALLIED HEALTH/NURSE VISIT (OUTPATIENT)
Dept: FAMILY MEDICINE | Facility: OTHER | Age: 34
End: 2024-10-09
Attending: OBSTETRICS & GYNECOLOGY
Payer: COMMERCIAL

## 2024-10-09 DIAGNOSIS — Z30.42 ENCOUNTER FOR SURVEILLANCE OF INJECTABLE CONTRACEPTIVE: Primary | ICD-10-CM

## 2024-10-09 PROCEDURE — 96372 THER/PROPH/DIAG INJ SC/IM: CPT | Performed by: OBSTETRICS & GYNECOLOGY

## 2024-10-09 RX ADMIN — MEDROXYPROGESTERONE ACETATE 150 MG: 150 INJECTION, SUSPENSION INTRAMUSCULAR at 13:46

## 2025-01-20 ENCOUNTER — ALLIED HEALTH/NURSE VISIT (OUTPATIENT)
Dept: ALLERGY | Facility: OTHER | Age: 35
End: 2025-01-20
Attending: OBSTETRICS & GYNECOLOGY
Payer: COMMERCIAL

## 2025-01-20 DIAGNOSIS — Z30.42 ENCOUNTER FOR SURVEILLANCE OF INJECTABLE CONTRACEPTIVE: Primary | ICD-10-CM

## 2025-01-20 PROCEDURE — 96372 THER/PROPH/DIAG INJ SC/IM: CPT | Performed by: OBSTETRICS & GYNECOLOGY

## 2025-01-20 RX ADMIN — MEDROXYPROGESTERONE ACETATE 150 MG: 150 INJECTION, SUSPENSION INTRAMUSCULAR at 08:38

## 2025-01-20 NOTE — PROGRESS NOTES
Clinic Administered Medication Documentation      Depo Provera Documentation    Depo-Provera Standing Order inclusion/exclusion criteria reviewed.     Is this the initial or subsequent dose of Depo Provera? Subsequent dose - patient is within the acceptable window of time (11-15 weeks) for subsequent injection. Pregnancy test not indicated.    Patient meets: inclusion criteria     Is there an active order (written within the past 365 days, with administrations remaining, not ) in the chart? Yes.     Prior to injection, verified patient identity using patient's name and date of birth. Medication was administered. Please see MAR and medication order for additional information.     Vial/Syringe: Single dose vial. Was entire vial of medication used? Yes    Patient instructed to report any adverse reaction to staff immediately.  NEXT INJECTION DUE: 25 - 25    Verified that the patient has refills remaining in their prescription.

## 2025-05-06 ENCOUNTER — ALLIED HEALTH/NURSE VISIT (OUTPATIENT)
Dept: FAMILY MEDICINE | Facility: OTHER | Age: 35
End: 2025-05-06
Attending: NURSE PRACTITIONER
Payer: COMMERCIAL

## 2025-05-06 ENCOUNTER — LAB (OUTPATIENT)
Dept: LAB | Facility: OTHER | Age: 35
End: 2025-05-06
Attending: NURSE PRACTITIONER
Payer: COMMERCIAL

## 2025-05-06 DIAGNOSIS — Z30.42 ENCOUNTER FOR SURVEILLANCE OF INJECTABLE CONTRACEPTIVE: Primary | ICD-10-CM

## 2025-05-06 DIAGNOSIS — Z30.42 ENCOUNTER FOR SURVEILLANCE OF INJECTABLE CONTRACEPTIVE: ICD-10-CM

## 2025-05-06 LAB — HCG UR QL: NEGATIVE

## 2025-05-06 PROCEDURE — 81025 URINE PREGNANCY TEST: CPT

## 2025-05-06 RX ORDER — MEDROXYPROGESTERONE ACETATE 150 MG/ML
150 INJECTION, SUSPENSION INTRAMUSCULAR
Status: ACTIVE | OUTPATIENT
Start: 2025-05-06 | End: 2026-05-01

## 2025-05-06 NOTE — PROGRESS NOTES
Patient presents to clinic today for depo-provera injection.    Patient last received depo-provera on 1/20/25 with NEXT INJECTION DUE: 4/7/25 - 5/5/25.  Current order for depo-provera ended 1/20/25.    Clinic Administered Medication Documentation      Depo Provera Documentation    Depo-Provera Standing Order inclusion/exclusion criteria reviewed.     Is this the initial or subsequent dose of Depo Provera? Subsequent dose - patient is not within the acceptable window of time (11-15 weeks) for subsequent injection. Pregnancy test is indicated. Pregnancy test result: negative       Patient meets: exclusion criteria     Patient does not meet criteria to receive Depo Provera injection today due to having unprotected intercourse within the past 14 days. Clinician informed. Patient instructed to Advise patient to abstain from intercourse for 14 days  AND   Instruct patient to return to the clinic in 14 days for a urine pregnancy test.   o If test is negative you are able to give Depo-Provera injection   OR     Provider visit    Patient has appointment with provider on 5/23/25 and will address at that time.  Encounter routed to Mona Reid NP for update/review.    Shawnee Traore RN Care Coordinator  [facility]

## 2025-05-06 NOTE — Clinical Note
Per standing order policy- unable to administer Depo-provera due to patient being outside of acceptable window and reporting unprotected intercourse in the past two weeks. See note- patient plans to wait until appointment with you on 5/23/25.

## 2025-05-23 ENCOUNTER — RESULTS FOLLOW-UP (OUTPATIENT)
Dept: FAMILY MEDICINE | Facility: OTHER | Age: 35
End: 2025-05-23

## 2025-05-23 PROBLEM — O99.213 OBESITY AFFECTING PREGNANCY IN THIRD TRIMESTER: Status: RESOLVED | Noted: 2022-06-14 | Resolved: 2025-05-23

## 2025-06-26 ENCOUNTER — MYC MEDICAL ADVICE (OUTPATIENT)
Dept: FAMILY MEDICINE | Facility: OTHER | Age: 35
End: 2025-06-26

## 2025-06-26 ENCOUNTER — TELEPHONE (OUTPATIENT)
Dept: FAMILY MEDICINE | Facility: OTHER | Age: 35
End: 2025-06-26

## 2025-06-26 NOTE — TELEPHONE ENCOUNTER
Received a PA request from [facility] for semaglutide-weight management (WEGOVY) 0.25 MG/0.5ML pen. Submitted on CMM. Waiting for a response.

## 2025-06-26 NOTE — TELEPHONE ENCOUNTER
Received a DENIAL from [facility] for semaglutide-weight management (WEGOVY) 0.25 MG/0.5ML pen.

## (undated) DEVICE — SCISSOR-ENDOPATH 5MM CURVED

## (undated) DEVICE — TUBING VACUUM COLLECTION 6FT 23116

## (undated) DEVICE — CURETTES-D&C 9MM ST

## (undated) DEVICE — LIGHT HANDLE COVER FOR SKYTRON LIGHTS

## (undated) DEVICE — BLANKET-BAIR UPPER BODY

## (undated) DEVICE — CANISTER-SUCTION 2000CC

## (undated) DEVICE — LABEL STERILE PREPRINTED FOR OR FRRH01-2M

## (undated) DEVICE — CLEARIFY VISUALIZATION SYSTEM (SCOPE WARMER)

## (undated) DEVICE — COVER LT HANDLE 2/PK 5160-2FG

## (undated) DEVICE — SUTURE-MONOCRYL 4-0 Y494G

## (undated) DEVICE — TROCAR SLEEVE-KII 5X100MM

## (undated) DEVICE — CAUTERY-LAP L-HOOK W/SHAFT 33CM

## (undated) DEVICE — Device

## (undated) DEVICE — GLV-7.5 BIOGEL LATEX

## (undated) DEVICE — MARKER RADIOLOGY HYPOALLERGENIC 2MM N-SPOT 610

## (undated) DEVICE — INZII RETRIEVAL SYSTEM-10MM

## (undated) DEVICE — DRSG NON ADHERING 3 X 8 TELFA 1238

## (undated) DEVICE — SOL NACL 0.9% IRRIG 1000ML BOTTLE 2F7124

## (undated) DEVICE — SUCTION-IRRIGATION STRYKEFLOW II (STRYKER)

## (undated) DEVICE — TUBE-SALEM SUMP 18FR STOMACH SUCTION

## (undated) DEVICE — CLIP APPLIER-LIGAMAX 5MM MEDIUM/LARGE

## (undated) DEVICE — SOL WATER IRRIG 1000ML BOTTLE 2F7114

## (undated) DEVICE — BIN-UROLOGY / CYSTO BN47

## (undated) DEVICE — PACK-BASIN SET-UP

## (undated) DEVICE — CORD-LAPAROSCOPIC MONOPOLAR-DISPOSABLE

## (undated) DEVICE — TROCAR-5X100MM FIOS BLADELESS

## (undated) DEVICE — PACK-LAPAROSCOPY-CUSTOM

## (undated) DEVICE — TRAY PREP DRY SKIN SCRUB 067

## (undated) DEVICE — SUTURE-VICRYL 0 UR-6 J603H

## (undated) DEVICE — LABEL-STERILE PREPRINTED FOR OR

## (undated) DEVICE — PACK GYN CYSTO CUSTOM SMA32GCMBF

## (undated) DEVICE — PACK BASIN SET UP SUTCNBSBBA

## (undated) DEVICE — TROCAR SYSTEM-BLUNT TIP 12X100MM KII BALLOON

## (undated) DEVICE — PAD PERI INDIV WRAP 11" 2022A

## (undated) DEVICE — SUCTION CANNULA UTERINE 16MM CVD 022116

## (undated) DEVICE — SCD SLEEVE-KNEE REG.

## (undated) DEVICE — SPECIMEN TRAP VACUUM SUCTION 003984-901

## (undated) DEVICE — STERI-STRIP-1/2" X 4"

## (undated) DEVICE — DRAPE STERI TOWEL LG 1010

## (undated) DEVICE — CAUTERY PAD-POLYHESIVE II ADULT

## (undated) DEVICE — CATH SELF CATH MALE 14FR 414

## (undated) DEVICE — APPLICATOR-CHLORAPREP 26ML TINTED CHG 2%+ 70% IPA-SURGICAL

## (undated) DEVICE — SUCTION CANNULA UTERINE 08MM STR 022208-10

## (undated) DEVICE — TUBING-INSUFFLATION/LAPAROFLATOR W/FILTER

## (undated) DEVICE — GLV 8.5 BIOGEL LATEX 30485-01

## (undated) DEVICE — TUBING-SEECLEAR LAPAROSCOPIC SMOKE EVACUATION

## (undated) DEVICE — BLADE-SCALPEL #11

## (undated) DEVICE — SOL-NACL 0.9% 1000ML

## (undated) DEVICE — IRRIGATION-H2O 1000ML

## (undated) DEVICE — SYR BULB IRRIG DOVER 60 ML LATEX FREE 67000

## (undated) DEVICE — IRRIGATION-NACL 1000ML

## (undated) RX ORDER — FENTANYL CITRATE 50 UG/ML
INJECTION, SOLUTION INTRAMUSCULAR; INTRAVENOUS
Status: DISPENSED
Start: 2022-09-06

## (undated) RX ORDER — HYDROMORPHONE HYDROCHLORIDE 1 MG/ML
INJECTION, SOLUTION INTRAMUSCULAR; INTRAVENOUS; SUBCUTANEOUS
Status: DISPENSED
Start: 2022-09-06

## (undated) RX ORDER — KETOROLAC TROMETHAMINE 30 MG/ML
INJECTION, SOLUTION INTRAMUSCULAR; INTRAVENOUS
Status: DISPENSED
Start: 2022-09-06

## (undated) RX ORDER — FENTANYL CITRATE 50 UG/ML
INJECTION, SOLUTION INTRAMUSCULAR; INTRAVENOUS
Status: DISPENSED
Start: 2023-03-03

## (undated) RX ORDER — MEDROXYPROGESTERONE ACETATE 150 MG/ML
INJECTION, SUSPENSION INTRAMUSCULAR
Status: DISPENSED
Start: 2024-07-01

## (undated) RX ORDER — ONDANSETRON 2 MG/ML
INJECTION INTRAMUSCULAR; INTRAVENOUS
Status: DISPENSED
Start: 2023-03-03

## (undated) RX ORDER — DEXAMETHASONE SODIUM PHOSPHATE 10 MG/ML
INJECTION, SOLUTION INTRAMUSCULAR; INTRAVENOUS
Status: DISPENSED
Start: 2023-03-03

## (undated) RX ORDER — MEDROXYPROGESTERONE ACETATE 150 MG/ML
INJECTION, SUSPENSION INTRAMUSCULAR
Status: DISPENSED
Start: 2025-01-20

## (undated) RX ORDER — LIDOCAINE HYDROCHLORIDE 20 MG/ML
INJECTION, SOLUTION EPIDURAL; INFILTRATION; INTRACAUDAL; PERINEURAL
Status: DISPENSED
Start: 2022-09-06

## (undated) RX ORDER — MEDROXYPROGESTERONE ACETATE 150 MG/ML
INJECTION, SUSPENSION INTRAMUSCULAR
Status: DISPENSED
Start: 2024-04-12

## (undated) RX ORDER — ONDANSETRON 2 MG/ML
INJECTION INTRAMUSCULAR; INTRAVENOUS
Status: DISPENSED
Start: 2022-09-06

## (undated) RX ORDER — PROPOFOL 10 MG/ML
INJECTION, EMULSION INTRAVENOUS
Status: DISPENSED
Start: 2023-03-03

## (undated) RX ORDER — DEXAMETHASONE SODIUM PHOSPHATE 10 MG/ML
INJECTION, SOLUTION INTRAMUSCULAR; INTRAVENOUS
Status: DISPENSED
Start: 2022-09-06

## (undated) RX ORDER — MEDROXYPROGESTERONE ACETATE 150 MG/ML
INJECTION, SUSPENSION INTRAMUSCULAR
Status: DISPENSED
Start: 2024-10-09

## (undated) RX ORDER — GLYCOPYRROLATE 0.2 MG/ML
INJECTION, SOLUTION INTRAMUSCULAR; INTRAVENOUS
Status: DISPENSED
Start: 2023-03-03

## (undated) RX ORDER — PROPOFOL 10 MG/ML
INJECTION, EMULSION INTRAVENOUS
Status: DISPENSED
Start: 2022-09-06